# Patient Record
Sex: FEMALE | Race: WHITE | Employment: FULL TIME | URBAN - METROPOLITAN AREA
[De-identification: names, ages, dates, MRNs, and addresses within clinical notes are randomized per-mention and may not be internally consistent; named-entity substitution may affect disease eponyms.]

---

## 2017-07-20 ENCOUNTER — TRANSFERRED RECORDS (OUTPATIENT)
Dept: HEALTH INFORMATION MANAGEMENT | Facility: CLINIC | Age: 52
End: 2017-07-20

## 2017-09-12 ENCOUNTER — TRANSFERRED RECORDS (OUTPATIENT)
Dept: HEALTH INFORMATION MANAGEMENT | Facility: CLINIC | Age: 52
End: 2017-09-12

## 2017-11-07 ENCOUNTER — TRANSFERRED RECORDS (OUTPATIENT)
Dept: HEALTH INFORMATION MANAGEMENT | Facility: CLINIC | Age: 52
End: 2017-11-07

## 2018-08-30 ENCOUNTER — TRANSFERRED RECORDS (OUTPATIENT)
Dept: HEALTH INFORMATION MANAGEMENT | Facility: CLINIC | Age: 53
End: 2018-08-30

## 2018-11-08 ENCOUNTER — TRANSFERRED RECORDS (OUTPATIENT)
Dept: HEALTH INFORMATION MANAGEMENT | Facility: CLINIC | Age: 53
End: 2018-11-08

## 2018-12-28 ENCOUNTER — TRANSFERRED RECORDS (OUTPATIENT)
Dept: HEALTH INFORMATION MANAGEMENT | Facility: CLINIC | Age: 53
End: 2018-12-28

## 2019-01-23 ENCOUNTER — TRANSFERRED RECORDS (OUTPATIENT)
Dept: HEALTH INFORMATION MANAGEMENT | Facility: CLINIC | Age: 54
End: 2019-01-23

## 2019-01-28 ENCOUNTER — TRANSFERRED RECORDS (OUTPATIENT)
Dept: HEALTH INFORMATION MANAGEMENT | Facility: CLINIC | Age: 54
End: 2019-01-28

## 2019-02-07 ENCOUNTER — TRANSFERRED RECORDS (OUTPATIENT)
Dept: HEALTH INFORMATION MANAGEMENT | Facility: CLINIC | Age: 54
End: 2019-02-07

## 2019-02-08 ENCOUNTER — TRANSFERRED RECORDS (OUTPATIENT)
Dept: HEALTH INFORMATION MANAGEMENT | Facility: CLINIC | Age: 54
End: 2019-02-08

## 2019-03-20 ENCOUNTER — TRANSFERRED RECORDS (OUTPATIENT)
Dept: HEALTH INFORMATION MANAGEMENT | Facility: CLINIC | Age: 54
End: 2019-03-20

## 2019-10-28 ENCOUNTER — TRANSFERRED RECORDS (OUTPATIENT)
Dept: HEALTH INFORMATION MANAGEMENT | Facility: CLINIC | Age: 54
End: 2019-10-28

## 2019-11-13 ENCOUNTER — TRANSFERRED RECORDS (OUTPATIENT)
Dept: HEALTH INFORMATION MANAGEMENT | Facility: CLINIC | Age: 54
End: 2019-11-13

## 2020-02-26 ENCOUNTER — TRANSFERRED RECORDS (OUTPATIENT)
Dept: HEALTH INFORMATION MANAGEMENT | Facility: CLINIC | Age: 55
End: 2020-02-26

## 2020-03-04 ENCOUNTER — TRANSFERRED RECORDS (OUTPATIENT)
Dept: HEALTH INFORMATION MANAGEMENT | Facility: CLINIC | Age: 55
End: 2020-03-04

## 2020-03-11 ENCOUNTER — TRANSFERRED RECORDS (OUTPATIENT)
Dept: HEALTH INFORMATION MANAGEMENT | Facility: CLINIC | Age: 55
End: 2020-03-11

## 2020-03-25 ENCOUNTER — REFERRAL (OUTPATIENT)
Dept: TRANSPLANT | Facility: CLINIC | Age: 55
End: 2020-03-25

## 2020-03-25 DIAGNOSIS — K86.1 CHRONIC PANCREATITIS (H): Primary | ICD-10-CM

## 2020-03-25 NOTE — TELEPHONE ENCOUNTER
Auto Islet Intake Notes    Initial Call Made by:  Evelyn Miles    Referring Physician:  Dr Kent    Assigned Coordinator:  Jessica Reynolds    Reported Diagnosis:  Chronic pancreatitis      Preferred Evaluation Start Date:  ASAP     MyChart setup and link for [facility] sent to patient     Auto Islet packet sent by mail     Verbal and Email consent: Yes      Care Team (phone and fax):   PCP:  Dr Deshaun Kent   GI:     Endo:     Pain Management:     Referring Physician:  Dr Kent   Surgeon (if had one):        Insurance information:   Insurance:  [facility]  PPO   Policy/Subscriber number:  [id number]   Group Number:  [id number]   Relation to insured:  self   Effective since:  years

## 2020-03-26 ENCOUNTER — DOCUMENTATION ONLY (OUTPATIENT)
Dept: TRANSPLANT | Facility: CLINIC | Age: 55
End: 2020-03-26

## 2020-04-29 ENCOUNTER — VIRTUAL VISIT (OUTPATIENT)
Dept: TRANSPLANT | Facility: CLINIC | Age: 55
End: 2020-04-29
Attending: PEDIATRICS
Payer: COMMERCIAL

## 2020-04-29 DIAGNOSIS — K86.1 OTHER CHRONIC PANCREATITIS (H): ICD-10-CM

## 2020-04-29 RX ORDER — IBUPROFEN 600 MG/1
600 TABLET, FILM COATED ORAL
COMMUNITY
Start: 2019-01-30 | End: 2020-07-23

## 2020-04-29 RX ORDER — SUMATRIPTAN 50 MG/1
50 TABLET, FILM COATED ORAL
COMMUNITY
Start: 2020-01-16 | End: 2020-07-23

## 2020-04-29 RX ORDER — ONDANSETRON 4 MG/1
4 TABLET, ORALLY DISINTEGRATING ORAL EVERY 8 HOURS PRN
COMMUNITY
Start: 2019-01-07

## 2020-04-29 RX ORDER — HYDROCODONE BITARTRATE AND ACETAMINOPHEN 5; 325 MG/1; MG/1
1 TABLET ORAL EVERY 6 HOURS PRN
Status: ON HOLD | COMMUNITY
End: 2020-08-06

## 2020-04-29 RX ORDER — FLUTICASONE PROPIONATE 50 MCG
1 SPRAY, SUSPENSION (ML) NASAL 2 TIMES DAILY
COMMUNITY
Start: 2019-08-29 | End: 2020-09-03

## 2020-04-29 RX ORDER — CETIRIZINE HYDROCHLORIDE 10 MG/1
TABLET, CHEWABLE ORAL
COMMUNITY
Start: 2018-11-01 | End: 2020-07-23

## 2020-04-29 NOTE — PROGRESS NOTES
Pediatric Endocrinology/ Islet Autotransplant  Chronic Pancreatitis Consult    Patient Active Problem List   Diagnosis     Chronic pancreatitis (H)       Assessment/Plan:  1.  Chronic pancreatitis, and h/o recurrent acute pancreatitis    Dottie is a 54 year old with pancreatitis, considering surgical management.      Complicating issues:  -- Puestow and long duration of disease.  Discussed specific risks for this patient, primarily likely lower than average islet yield, reduced chance for insulin independence (likely 10% in contrast to up to 40% off insulin per standard statistics below) but that goal will be to have islet graft function.  -- Discussed long recovery time, chance for pain relief (85%) and likely some lesser pain persisting from central sensitization.   -- consider genetic testing- suspect she may have a genetic cofactor (with divisum) based on age of onset.  Also has pulmonary history- recurrent pneumonia as child, at least 4 episodes.  -- Have GI review imaging from 2/2020-- looking at radiology report for MRCP appears there may have been concern for a focal lesion and unclear to me if EUS was done subsequent to this, or any more recent imaging.       The primary purpose of today's visit was to review the patient's endocrine history and provide counseling on islet autotransplantation.  We discussed the procedure of islet autotransplant, the post-operative management, and the prognosis.  We specifically discussed that all patients are on insulin after this procedure, typically for at least several months.  About 1/3rd of patients will become insulin independent.  However, there remains a very high lifetime risk for diabetes.  Of the patients who require life long insulin therapy, most will have some graft function and a significant portion can maintain reasonable glycemic control on once injection per day of basal insulin.  However, about 30-40% of patients will require 4-6 injections per day or an  insulin pump for management.  Only 10% have no or minimal islet function;  These patients are at higher risk for a labile form of diabetes mellitus that can be difficult to manage.  The main goal of the islet autotransplant procedure is to preserve enough beta cell mass to make diabetes manageable.  Because of the high risk of diabetes mellitus, all patients must be willing to accept diabetes and insulin injections as a trade off for relief from pancreatic pain.    We discussed diabetes technologies including insulin pump and sensors.    We discussed will need eventual standard consult blood work including mixed meal, which we cannot yet do because of covid situation.    Also will need DXA locally for evaluation for osteopenia/osteoporosis due to increased risk for low BMD and fracture in the setting of CP.      All surgical consults are reviewed by our multi-disciplinary team to determine if surgery is an appropriate next option.      I spent 50 minutes on video for this visit, providing counseling on above issues, and 30 minutes reviewing outside records.    Enedina López MD  [facility]  Phone:  [phone]  Fax:  [phone]          CC:  Chronic pancreatitis, surgical evaluation    HPI:  Dottie Hernandez is a 54 year old female referred by Dr. Kent ([facility]) for new patient consultation of endocrine function in the setting of recurrent acute and chronic pancreatitis.    Pancreatitis history is as follows:  Dottie first had symptoms of pancreatitis about 20 years ago during her 2nd pregnancy, but this went undiagnosed for some time.  During her last pregnancy, about 18 years ago, she even had exploratory surgery, for severe abdominal pain.  It is unclear exactly when the diagnosis of pancreatitis was made, but maybe at that time. She had after pregnancy episodes of acute abdominal pain intermittently that appear to have been  acute recurrent pancreatitis and was eventually tested and found to have elevated lipase from her reports. I can see from records we have since 2011 that she clearly had acute pancreatitis in 1/2011.  Most of the earlier records are not visible.    She was diagnosed sometime before 2011 also with pancreas divisum, and had 1 ERCP with minor papillotomy and PD stent from her descirpiton.  She then subsequently had two surgeries in 2011, around April and July 2011.  The first procedure was open sphincterotomy and the second was a Puestow.  She describes both as having complications but she eventually did better for several years, with rare exacerbations of pain, until around Sept of 2018, where she had recurrence of significant pain. She was seen again for surgical consultation, and TPIAT was discussed at that time, but she was not ready to pursue total pancreatectomy, so on 1/23/2019 she underwent revision of the Puestow.  Operative notes indicate she had significant PD dilation and pancreatic ductal stones at that time, consistent with clear chronic pancreatitis. She did have a MRCP after this procedure in 2/27/2020 that still report a PD dilation in the body and tail, so it is not clear to me that current Puestow extends for the entire length of the pancreatic duct.  Unfortunately she had only very temporary pain relief after this and has continued to struggle with chronic pain of fluctuating severity. 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 She has tried to keep opioid use minimal and is on a hydrocodone/acetominophen 5/325 mg using only 1-2 per day. She does not think she has exocrine insufficiency, was on Creon at some point in the past to treat pain symptoms but it was not effective and she stopped.     Of note, she specifically wishes to avoid oxycodone-- she felt that was more addicting when put on oxycodone in  "past.      Evaluation/ imaging/ treatments:  Elevated amylase and lipase by history:  YES  Etiology of disease: attributed to pancreas divisum.  She has not had genetic testing but first likely episode was 34 years of age (which puts her in high risk category for having a genetic risk factor)  Recent imaging studies:   MRCP 2/27/2020:  Showed dilated PD in body and tail, with \"cut off\" at point of Peustow described  Medical treatment(s):  Pain management, NPO, tried/failed enzymes.  ERCP procedures: YEs;   Number of ERCPs:  1.  Do not have record, we think minor pancreatic duct sphincterotomy and stent placement.   Prior pancreatic surgery: YES, as detailed above, most significantly had Peustow and revision, first Peustow procedure was 7/2011.  Also had a sphincteroplasty prior to that.  Has had cholecystectomy  No nerve blocks    Endocrine history:  Notably has not had any hyperglycemia or insulin use, even around time of other pancreas surgery.        Review of Systems:  A comprehensive 10 point review of systems was performed and was negative except as noted in the HPI above.    Past Medical History:  Past Medical History:   Diagnosis Date     Allergies      Chronic pancreatitis (H)      Recurrent acute pancreatitis    also 4 episodes pneumonia as child.      Past Surgical History:   Procedure Laterality Date     APPENDECTOMY       LAPAROTOMY EXPLORATORY      approximately 2002, during pregnancy for pain     open sphincteroplasty/sphincterotomy  04/2011     PANCREATECTOMY PEUSTOW  07/2011     PANCREATECTOMY PEUSTOW  01/2019    revision       Current medications:  Current Outpatient Medications   Medication Sig Dispense Refill     cetirizine (ZYRTEC) 10 MG CHEW        fluticasone (FLONASE) 50 MCG/ACT nasal spray        HYDROcodone-acetaminophen (NORCO) 5-325 MG tablet Take 1 tablet by mouth every 6 hours as needed for severe pain prn       ibuprofen (ADVIL/MOTRIN) 600 MG tablet 600 mg       ondansetron " (ZOFRAN-ODT) 4 MG ODT tab 4 mg       SUMAtriptan (IMITREX) 50 MG tablet 50 mg         Family History:  The family history was reviewed with particular attention to diabetes and pancreatitis history, and updated by me as pertinent, and is as documented below.    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       Physical Exam:  Vitals:  ND due to virtual viist.  General:  Well appearing female in no acute distress  HEENT: NC/AT, sclera appear white  Neck:  No obvious thyromegaly  CV/Lungs:  Non distressed breathing  Neuro: Normal mental status  Pscyh:  Normal affect          Results:  reviewed outside labs and imaging as available. We will need consult assessments at later date

## 2020-04-29 NOTE — PROGRESS NOTES
"Called patient and left message , calling to set up video visit, I will try again in 10/15 mins. Shira Jamison CMA       Chief Complaint   Patient presents with     Appointment     called patient 2 times and no answer , scheuled for a video visit@9 with Maribel Jamison CMA   Dottie Hernandez is a 54 year old female who is being evaluated via a billable video visit.      The patient has been notified of following:     \"This video visit will be conducted via a call between you and your physician/provider. We have found that certain health care needs can be provided without the need for an in-person physical exam.  This service lets us provide the care you need with a video conversation.  If a prescription is necessary we can send it directly to your pharmacy.  If lab work is needed we can place an order for that and you can then stop by our lab to have the test done at a later time.    Video visits are billed at different rates depending on your insurance coverage.  Please reach out to your insurance provider with any questions.    If during the course of the call the physician/provider feels a video visit is not appropriate, you will not be charged for this service.\"    Patient has given verbal consent for Video visit? Yes    How would you like to obtain your AVS? MyChart    Patient would like the video invitation sent by: Send to e-mail at: [email or web address]    Will anyone else be joining your video visit? No        Video-Visit Details    Type of service:  Video Visit    Video Start Time: 9:20am  Video End Time: 10:10am    Originating Location (pt. Location): Home    Distant Location (provider location):  [facility] SOL[facility] ORGAN [facility]ANSPL[facility]     Mode of Communication:  Video Conference via [facility]      See additional [facility] Note    Enedina López MD  , Pediatric Endocrinology and Diabetes  [facility]      "

## 2020-04-30 ENCOUNTER — VIRTUAL VISIT (OUTPATIENT)
Dept: TRANSPLANT | Facility: CLINIC | Age: 55
End: 2020-04-30
Attending: TRANSPLANT SURGERY
Payer: COMMERCIAL

## 2020-04-30 VITALS — BODY MASS INDEX: 24.16 KG/M2 | WEIGHT: 145 LBS | HEIGHT: 65 IN

## 2020-04-30 DIAGNOSIS — K86.1 CHRONIC PANCREATITIS, UNSPECIFIED PANCREATITIS TYPE (H): Primary | ICD-10-CM

## 2020-04-30 ASSESSMENT — MIFFLIN-ST. JEOR: SCORE: 1268.6

## 2020-04-30 NOTE — PROGRESS NOTES
"Dottie Hernandez is a 54 year old female who is being evaluated via a billable video visit.      The patient has been notified of following:     \"This video visit will be conducted via a call between you and your physician/provider. We have found that certain health care needs can be provided without the need for an in-person physical exam.  This service lets us provide the care you need with a video conversation.  If a prescription is necessary we can send it directly to your pharmacy.  If lab work is needed we can place an order for that and you can then stop by our lab to have the test done at a later time.    Video visits are billed at different rates depending on your insurance coverage.  Please reach out to your insurance provider with any questions.    If during the course of the call the physician/provider feels a video visit is not appropriate, you will not be charged for this service.\"    Patient has given verbal consent for Video visit? Yes    How would you like to obtain your AVS? E-Mail (inform patient AVS not encrypted)    Patient would like the video invitation sent by: Text to cell phone: [phone]    Will anyone else be joining your video visit? No        Video-Visit Details    Type of service:  Video Visit    Video Start Time: 2:15 pm  Video End Time: 2:50 pm    Originating Location (pt. Location): Home    Distant Location (provider location):  [employer] [facility]GAN [facility]PL[facility]     Platform used for Video Visit: Anuradha Jarvis MD    Chronic Pancreatitis Group Consult Note     Patient: Dottie Hernandez,   YOB: 1965,   Medical record number: [id number]  Consult requested by Dr Kent for evaluation of chronic pancreatitis.    Assessment:  1. Chronic painful pancreatitis   2. Etiology: Pancreatic Divisum    Criteria for TPIAT: failed endoscopic stenting with small duct pancreatitis, chronic pain affecting daily functioning, non-diabetic.    Recommendations: Ms. Sinclair" David appears to be a good candidate for total pancreatectomy and islet autotransplant.     The majority of our consultation visit today was spent discussing potential surgical and medical complications of total pancreatectomy, the range of outcomes for pain control, diabetes, and long term sequela. The goal of the operation is relief from chronic pain, with restoration of a more normal functional status. Surgical risks include bleeding, possible transfusion, infection, deep vein or portal vein clotting, bile leak or stricture, injury to the liver vasculature, delayed gastric emptying, hernia, need for reoperation, bowel obstruction, difficulties with nausea, constipation and diarrhea, as well as other medical risks such as pneumonia, cardiac risks, malnutrition, brittle diabetes, and a 0.5% perioperative risk of death. The patient was told that oral pancreatic enzyme replacement therapy and acid blockade would be permanently required with meals and snacks in order to prevent malnutrition and vitamin deficiencies and ulcer disease. There is risk of developing motility issues (diarrhea, constipation, nausea) that may be difficult to manage, especially if the patient cannot completely wean from narcotics. The patient understands that the islet cell autotransplant portion of the procedure is to prevent or ameliorate the otherwise inevitable insulin dependent and brittle diabetes that would result from total pancreatectomy. There is a very low (but not zero) risk of transplanting cancerous pancreatic tissue unknowingly. The patient understands the need to accept diabetes as a potential consequence of proceeding with surgery, and we discussed the importance of proper short and long-term diabetes management. While it is not possible to completely predict the postoperative diabetes outcome with certainty in a single individual's case, in our series of more than 700 cases, approximately 1/3 of patients are insulin  independent, 1/3 required basal insulin only, and 1/3 required basal/bolus or 'typical diabetic' insulin therapy.  Rarely, we find intraoperatively that  pancreatectomy with islet cell transplant is not possible/safe due to vascular involvement or discovery of cancer. We discussed the average inpatient length of stay (10 days), the typical ekta- and post-operative patient experience, care plan for pain management, nutrition, and posttransplant intensive insulin therapy for at least several months and potentially permanently, as well as the requirement that a  care partner  be available to help with post-discharge outpatient care which usually lasts up to several weeks while the patient remains near the medical center for necessary outpatient care and monitoring. The patient was accompanied by  and there was discussion regarding the above which involved all parties.      Overall candidacy for total pancreatectomy and islet autotransplant will be determined by our Multidisciplinary Chronic Pancreatitis Team, and further recommendations will follow.  Thank you for the opportunity to participate in Ms. Dottie Hernandez's care.    Total time: 60 minutes  Counselling time: 35 minutes      Steve Jarvis MD  ---------------------------------------------------------------------------------------------------  HPI:   Patient was diagnosed with AP 20 years ago, attributed to pancreatic divisium, she had stones in pancreatic duct.  She had several attack during pregnancy requiring exploratory laparotomy at 8 weeks, had early labor with 3rd child.  Her multiple acute pancreatitis episodes evolved into chronic.  She had cholecystectomy, appendectomy  ERCP- stenting.  Peustow x3    Pain: once a day Norco 5/325  Pancreatic enzymes - tried in the past but did not work  No h/o nerve blocks    Eating on her own, avods spicy food, stays away from beef  Stress exacerbates her pain.  At this point her acute attacks   out, but she gets recurrent pain with new strictures.  She manages pain with medications and diet.    No DM    No fh pancreatitis  Mother NHL  No panc ca        Surgical evaluation:  1. Portal/Splenic Vein:Patent  2. Hepatic Artery: normal anatomy  3. Previous Pancreatic Surgery: Puestow Procedure x3  4. Pancreatic stent in place: No  5. MRCP: pancreatic duct dilation; atrophic panreas  7. Cardiac: Risk factors: none. Performance status: no chest pain or shortness of breath with exertion.   8. Nutritional Status: Moderate  9. Genetic Evaluation: needs to be done.      The patient is not diabetic.     Other Pertinent History:  []          Urinary retention  []          Gestational diabetes  []          Fear of needles  []          Orthodox  []          Kidney problems  []          Family member with pancreatitis  []          Family member with pancreatic cancer  []          Currently following with a psychologist/psychiatrist     []          History of  care with psychologist/psychiatrist     []          Depression    []          Anxiety    []          Sexual abuse history   []          Non-prescription substance use  []          Prior overdose  []          Ever shared or sold own narcotic supply  []          Ever discharged from a pain clinic  []          Family history of cystic fibrosis  []          Other:  [x]          None    Past Medical History:   Diagnosis Date     Allergies      Chronic pancreatitis (H)      Recurrent acute pancreatitis      Past Surgical History:   Procedure Laterality Date     APPENDECTOMY       LAPAROTOMY EXPLORATORY      approximately 2002, during pregnancy for pain     open sphincteroplasty/sphincterotomy  04/2011     PANCREATECTOMY PEUSTOW 07/2011     PANCREATECTOMY PEUSTOW 01/2019    revision     Family History   Problem Relation Age of Onset     Lymphoma Mother         non hodgkins lymphoma     Coronary Artery Disease Mother      Coronary Artery Disease Father       Diabetes No family hx of      Pancreatitis No family hx of      Social History     Socioeconomic History     Marital status:      Spouse name: Not on file     Number of children: Not on file     Years of education: Not on file     Highest education level: Not on file   Occupational History     Not on file   Social Needs     Financial resource strain: Not on file     Food insecurity     Worry: Not on file     Inability: Not on file     Transportation needs     Medical: Not on file     Non-medical: Not on file   Tobacco Use     Smoking status: Not on file   Substance and Sexual Activity     Alcohol use: Not on file     Drug use: Not on file     Sexual activity: Not on file   Lifestyle     Physical activity     Days per week: Not on file     Minutes per session: Not on file     Stress: Not on file   Relationships     Social connections     Talks on phone: Not on file     Gets together: Not on file     Attends Anabaptism service: Not on file     Active member of club or organization: Not on file     Attends meetings of clubs or organizations: Not on file     Relationship status: Not on file     Intimate partner violence     Fear of current or ex partner: Not on file     Emotionally abused: Not on file     Physically abused: Not on file     Forced sexual activity: Not on file   Other Topics Concern     Not on file   Social History Narrative    Dottie lives in [place] with her family and 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       ROS:  A 12 point review of systems was performed and was negative except for those listed above     Allergies:   No Known Allergies    Medications:  Prescription Medications as of 5/1/2020       Rx Number Disp Refills Start End Last Dispensed Date Next Fill Date Owning Pharmacy    cetirizine (ZYRTEC) 10 MG CHEW    11/1/2018        Class: Historical    fluticasone (FLONASE) 50 MCG/ACT nasal spray    8/29/2019        Class: Historical    HYDROcodone-acetaminophen (NORCO)  5-325 MG tablet            Sig: Take 1 tablet by mouth every 6 hours as needed for severe pain prn    Class: Historical    Route: Oral    ibuprofen (ADVIL/MOTRIN) 600 MG tablet    2019        Si mg    Class: Historical    ondansetron (ZOFRAN-ODT) 4 MG ODT tab    2019        Si mg    Class: Historical    SUMAtriptan (IMITREX) 50 MG tablet    2020        Si mg    Class: Historical          Exam:      Appearance: in no apparent distress.   Head and Neck: Normal, no rashes or jaundice  Respiratory: easy respirations, no audible wheezing.  Cardiovascular: well perfused skin  Abdomen: No distention, midline well healed incision  Extremeties: Edema, none  Neuro: without deficit, Full affect.    Diagnostics:   No results found for this or any previous visit (from the past 336 hour(s)).

## 2020-05-07 ENCOUNTER — DOCUMENTATION ONLY (OUTPATIENT)
Dept: TRANSPLANT | Facility: CLINIC | Age: 55
End: 2020-05-07

## 2020-05-07 NOTE — PROGRESS NOTES
Patient's case discussed at Chronic Pancreatitis Working Group on 5/6/2020.     After team discussion, it was determined that patient is a candidate for a Total Pancreatectomy-Islet Auto Transplant. Discussed that due to previous history of pancreatic surgeries her islet yield may be somewhat diminished.      Patient requirements before scheduling surgery: Pre-op vaccinations, consult with team genetic counselor, and Boost test.     Patient updated and agreeable to plan of care: Yes.       Verified that patient has coordinator contact information should they have any further questions. Coordinator will work with patient to have pre-op vaccinations adminstered and appointments scheduled.         Gena Grace RN BSN  Transplant coordinator   Total Pancreatectomy and Islet Auto Transplant program

## 2020-05-13 DIAGNOSIS — K86.1 CHRONIC PANCREATITIS (H): Primary | ICD-10-CM

## 2020-05-29 ENCOUNTER — VIRTUAL VISIT (OUTPATIENT)
Dept: CONSULT | Facility: CLINIC | Age: 55
End: 2020-05-29
Attending: GENETIC COUNSELOR, MS
Payer: COMMERCIAL

## 2020-05-29 DIAGNOSIS — Z71.83 ENCOUNTER FOR NONPROCREATIVE GENETIC COUNSELING: ICD-10-CM

## 2020-05-29 DIAGNOSIS — G89.29 CHRONIC ABDOMINAL PAIN: ICD-10-CM

## 2020-05-29 DIAGNOSIS — Z87.01 HISTORY OF PNEUMONIA AS A CHILD: ICD-10-CM

## 2020-05-29 DIAGNOSIS — K86.1 IDIOPATHIC CHRONIC PANCREATITIS (H): ICD-10-CM

## 2020-05-29 DIAGNOSIS — K86.1 CHRONIC PANCREATITIS, UNSPECIFIED PANCREATITIS TYPE (H): Primary | ICD-10-CM

## 2020-05-29 DIAGNOSIS — R10.9 CHRONIC ABDOMINAL PAIN: ICD-10-CM

## 2020-05-29 PROCEDURE — 96040 ZZH GENETIC COUNSELING, EACH 30 MINUTES: CPT | Mod: ZF | Performed by: GENETIC COUNSELOR, MS

## 2020-05-29 NOTE — PROGRESS NOTES
"Dottie Hernandez is a 54 year old female who is being evaluated via a billable video visit.      The patient has been notified of following:     \"This video visit will be conducted via a call between you and your physician/provider. We have found that certain health care needs can be provided without the need for an in-person physical exam.  This service lets us provide the care you need with a video conversation.  If a prescription is necessary we can send it directly to your pharmacy.  If lab work is needed we can place an order for that and you can then stop by our lab to have the test done at a later time.    Video visits are billed at different rates depending on your insurance coverage.  Please reach out to your insurance provider with any questions.    If during the course of the call the physician/provider feels a video visit is not appropriate, you will not be charged for this service.\"    Patient has given verbal consent for Video visit? Yes    How would you like to obtain your AVS? [facility]    Patient would like the video invitation sent by: Send to e-mail at: [email or web address]    Will anyone else be joining your video visit? No       Macy Chou M.A.    Video-Visit Details    Type of service:  Video Visit    Video Start Time:   Video End Time:     Originating Location (pt. Location):     Distant Location (provider location):  [facility]     Platform used for Video Visit:           "

## 2020-05-29 NOTE — LETTER
"  5/29/2020      RE: Dottie Hernandez  [address]  [facility] [address]       Dottie Hernandez is a 54 year old female who is being evaluated via a billable video visit.      The patient has been notified of following:     \"This video visit will be conducted via a call between you and your physician/provider. We have found that certain health care needs can be provided without the need for an in-person physical exam.  This service lets us provide the care you need with a video conversation.  If a prescription is necessary we can send it directly to your pharmacy.  If lab work is needed we can place an order for that and you can then stop by our lab to have the test done at a later time.    Video visits are billed at different rates depending on your insurance coverage.  Please reach out to your insurance provider with any questions.    If during the course of the call the physician/provider feels a video visit is not appropriate, you will not be charged for this service.\"    Patient has given verbal consent for Video visit? Yes    How would you like to obtain your AVS? [facility]    Patient would like the video invitation sent by: Send to e-mail at: [email or web address]    Will anyone else be joining your video visit? No  {If patient encounters technical issues they should call [phone] :[id number]}         Macy Chou M.A.  Video-Visit Details    Type of service:  Video Visit    Video Start Time: {video visit start/end time:152948}  Video End Time: {video visit start/end time:152948}    Originating Location (pt. Location): {video visit patient location:487186::\"Home\"}    Distant Location (provider location):  [facility]     Platform used for Video Visit: {Virtual Visit Platforms:719403::\"[facility]\"}    {signature options:113549}        Luisana Segovia GC  "

## 2020-06-12 ENCOUNTER — TRANSFERRED RECORDS (OUTPATIENT)
Dept: HEALTH INFORMATION MANAGEMENT | Facility: CLINIC | Age: 55
End: 2020-06-12

## 2020-06-12 DIAGNOSIS — G89.29 CHRONIC ABDOMINAL PAIN: ICD-10-CM

## 2020-06-12 DIAGNOSIS — Z87.01 HISTORY OF PNEUMONIA AS A CHILD: ICD-10-CM

## 2020-06-12 DIAGNOSIS — R10.9 CHRONIC ABDOMINAL PAIN: ICD-10-CM

## 2020-06-12 DIAGNOSIS — K86.1 CHRONIC PANCREATITIS, UNSPECIFIED PANCREATITIS TYPE (H): ICD-10-CM

## 2020-06-12 DIAGNOSIS — K86.1 IDIOPATHIC CHRONIC PANCREATITIS (H): ICD-10-CM

## 2020-06-22 DIAGNOSIS — K85.90 PANCREATITIS: Primary | ICD-10-CM

## 2020-06-24 DIAGNOSIS — Z11.59 ENCOUNTER FOR SCREENING FOR OTHER VIRAL DISEASES: Primary | ICD-10-CM

## 2020-06-24 PROBLEM — K85.90 PANCREATITIS: Status: ACTIVE | Noted: 2020-06-24

## 2020-06-26 NOTE — PROGRESS NOTES
"Genetic Counseling Consultation  This note is a summary of Dottie Hernandez s virtual visit to [facility] on May 29th, 2020. She was referred to our clinic by Dr. López for genetic testing due to a history of chronic pancreatitis. Genetic testing and a genetic counseling consultation was recommended to aid in better understanding the cause the pancreatitis, as well as provide additional information helpful in medical management and genetic risks for family members.     Summary of visit:  1. Genetics of pancreatitis were discussed, including known involved genes, inheritance patterns, and impact on family members.    2. Genetic testing options were discussed, and a Chronic Pancreatitis panel was ordered through [facility]. They will mail Dottie a saliva kit for her sample and complete an insurance verification process. From there, testing will be initiated. Results will take ~2-4 weeks; Dottie will be called when results are available.  3. Contact information was given for future questions or concerns that arise.    Personal Medical History:  Dottie has a history of recurrent acute and chronic pancreatitis, with symptoms starting around 34 years of age (though undiagnosed for several years). She had an exploratory surgery for abdominal pain approximately 18 years ago, and was eventually tested and found to have elevated lipase per report. She was diagnosed sometime before 2011 with pancreas divisum. Previous operative notes indicate she had significant PD dilation and pancreatic ductal stones, consistent with chronic pancreatitis. She notes continuing chronic pain and consideration of TPIAT. Excessive use of alcohol or other triggering factors is denied and work-up for pancreatitis appears to be otherwise negative for known causative factors.     Additional history includes skin cancer, recurrent pneumonia ~every year as a child, gallbladder removal, a \"pigeon chest\", sinus polyps that were " "removed, allergies, and sleep apnea.     Family History:  A three generation pedigree was obtained today and sent to be scanned into the EMR. The family history was significant for the following:    Dottie has three sons. Her eldest son is 22 years old and has a history of possible stomach issues/an ulcer. Her middle son is 20 and has a history of allergies. Her youngest son is 18 and was born premature, but is otherwise healthy.    Dottie has one brother and one sister. Her brother is 57 years old and has a history of high blood pressure. He has 2 children, who have no health concerns that Dottie is aware of. Dottie's sister is 59 and has a history of allergies. She has 4 children, who have a history of allergies and ADHD.    Dottie's mother passed away at 71 years old from non-hodgkin's lymphoma. She had a history of open heart surgery due to blockage. Dottie has two maternal uncles and one maternal aunt. One maternal uncle passed in his 80's from cancer, which Dottie believed was liver cancer. Dottie's maternal first cousins have no major health concerns. Dottie's maternal grandmother passed at an old age and her maternal grandfather passed in a car accident.    Dottie's father passed at 87 years old from \"old age.\" He had a history of heart surgery due to blockage. He was an only child. Dottie's paternal grandmother passed at an old age. Dottie's paternal grandfather passed at a young age from cancer. Dottie was unsure of what type of cancer and when he was diagnosed/passed, though this was when her father was in his teens.      The family history was negative for individuals with diagnosed pancreatitis, pancreatic cancer, known genetic conditions, and cystic fibrosis/CF symptoms. Dottie is of [place] ancestry on her maternal and English ancestry on her paternal side. Consanguinity was denied.    General Genetic Background Information;  Genes are long stretches of DNA that are responsible for how our " bodies look and how our bodies work.  We all have two copies of each gene; one inherited from the mother and one inherited from the father.  When there is a change, called a mutation, in the DNA sequence of a gene it can cause the signs and symptoms of a genetic condition. No one has control over which copy they pass on to their child since it is a random process.   Every person has two copies of each gene.      Genetics of Pancreatitis:  Knowns and unknowns about causes of pancreatitis, focusing on hereditary pancreatitis, were discussed today.  Hereditary pancreatitis can be defined based on family history criteria or on genetic findings in an individual. One of the genes that is causative for pancreatitis is PRSS1. This gene can also cause an increased risk for pancreatic cancer.  Many other genes, such as SPINK1, CTRC, CPA1, CaSR, and others are thought to be moderate risk factor genes and have an association with pancreatitis. Additionally idiopathic pancreatitis has been found to be associated with changes in the gene for cystic fibrosis (CFTR) as well. The variability in the inheritance of genes associated with hereditary pancreatitis was discussed.      Typical hereditary pancreatitis is a disease that is primarily inherited in an autosomal dominant manner, meaning that a change in one copy of a gene, like PRSS1, is needed to develop the condition. One other gene known to be associated with hereditary pancreatitis, CFTR, typically causes disease when inherited in an autosomal recessive manner, meaning that an individual must inherit a change in the CFTR gene from both their mother and father. The risk factor genes are also inherited in an autosomal dominant fashion, but with these genes most individuals that carry a change do not get pancreatitis. We discussed the complexity of genetic testing for hereditary pancreatitis and that further discussion would be needed when we have the results.     If a known  disease causing change was found, this would carry up to a 50% potential risk for future children and siblings to inherit the genetic change, and a 50% chance of not inheriting it. Not all individuals with genetic changes in these genes go on to develop pancreatitis, and each gene is associated with a different risk for the development of pancreatitis. If a change is found in the CFTR gene, this result would have additional implications and they will be discussed further if applicable. If a specific genetic variant in a family can be identified, more information about risk for other family members and familial testing would be available.     Genetic Testing:  There are genetic saliva or blood tests available that can help determine if an individual has changes in their PRSS1, SPINK1, CTRC, and CFTR genes. As the cause of Dottie's pancreatitis is unclear, comprehensive genetic testing will give useful information to aid in directing medical management, reproductive risks, and family member risks. Specifically, if a underlying explanation for Dottie's pancreatis can be found, it may give more information about how to appropriate manage her or give information about what to expect. In addition, it is possible an underlying genetic cause may predispose Dottie to other health risks. Knowing about these additional health risks can help us stay ahead of her healthcare to more appropriately screen for and potentially prevent other complications. Lastly, discovering an underlying reason may help predict the chance for other family members to have pancreatitis and/or other genetic conditions, such as cystic fibrosis.     Limitations and risks of genetic testing were also discussed, including that our genetic testing in 2020 is only as good as our current knowledge of genetics and genes. Therefore, a negative test result does not rule out all genetic changes and causes of pancreatitis. Other follow up may be needed.      A  complete discussion of benefits and limitations of testing both through [facility] and another commercial laboratory ([facility]) was discussed today. This included information about testing coverage/genes tested, cost, and testing protocol. Of note, [facility] pancreatitis testing includes additional risk factor information for pancreatitis not included elsewhere. Dottie elected to proceed with testing through [facility] due to cost considerations after full discussion of lab options and benefits/limitations. [facility] will mail a saliva kit directly to Dottie for sample collection. [facility] will contact Dottie for a decision on how to proceed after a benefits investigation is complete and will bill insurance directly. Once a sample is received and benefits investigation complete, results will take 2-4 weeks. I will call Dottie at that time. Verbal consent was obtained for [facility]'s pancreatitis panel (due to COVID-19) and sent into the EMR. This panel includes the following 6 genes: PRSS1, SPINK1, CASR, CFTR, CPA1, and CTRC.     Result Outcomes  Once results are available, we discussed the following possibilities:  1. One disease causing mutation found: Individuals with typical hereditary pancreatitis typically have one disease causing change detected.  Depending on the change found and other factors, individuals are at risk for development of pancreatitis, and other family members would be at risk.  2. One or two possibly contributory mutation found:  This result has various implications depending the specific change found.  The change(s) may contribute to disease.   3. Two mutations in the gene for CF:  Individuals with cystic fibrosis or cystic fibrosis-related disorder usually have two mutations in this gene, one inherited from their mother and one from their father. Depending on the changes in the gene and other factors, some people may have respiratory, GI symptoms,  and/or infertility issues.   4. No mutations in these genes: No genetic evidence to support hereditary pancreatitis at this time. It is possible we will learn more about the genetics of pancreatitis in the future and be able to offer more comprehensive genetic testing.   5. The finding of an unknown change: This happens when the laboratory detects a change but they do not know if it is found only in individuals with the condition, or if the change is found in individuals without pancreatitis as well. If you have this type of result, we will discuss it further at that time.     Plan  1. Consent was obtained for [facility]'s pancreatitis genetic panel. A saliva kit will be sent directly to Dottie for her sample. [facility] will perform an insurance pre-verification prior to testing to ensure coverage.   2. Results will take approximately 2-4 weeks once initiated and I will notify Dottie of the results as soon as we receive them. This test can detect many changes in the known genes associated with hereditary pancreatitis; however, there remain genetic contributions that are not well understood.  3. Additional medical management recommendations or family member recommendations will be discussed in the future based on results.      It was a pleasure to meet with Dottie virtually today. She had no additional questions. My contact information was given for future questions or concerns that arise.    Sangeetha Segovia MS, [facility]  Genetic Counseling  Genetics and Metabolism Division  [facility]   Phone: [phone]  Pager: [phone]     Time spent in consultation was approximately 59 minutes  CC:  No letter

## 2020-06-29 ENCOUNTER — TELEPHONE (OUTPATIENT)
Dept: CONSULT | Facility: CLINIC | Age: 55
End: 2020-06-29

## 2020-06-29 NOTE — TELEPHONE ENCOUNTER
I called Dottie to discuss her genetic test results, which have returned. We reviewed that her testing was sent to [facility], who tested for pathogenic genetic changes, or mutations, in 6 high/moderate risk pancreatitis genes: PRSS1, SPINK1, CASR, CFTR, CPA1, and CTRC. This testing returned negative/normal, meaning no pathogenic changes were identified in these genes. Therefore, no underlying single gene genetic explanation for Dottie's symptoms was found at this time.     We discussed that while these results decrease the chance that there is a genetic single gene cause or contribution for Dottie's pancreatitis, we cannot rule out a genetic cause/contribution and this does not rule out all causes of pancreatitis. Of note, our current genetic testing is only as good as our current understanding of genes and genetics. We certainly may know more about genetic factors and their contribution to pancreatitis in the future. Discussed Dottie could always reach out if she wished to discuss/be referred to discuss pursuing [facility] pancreatitis testing (which includes additional information about common small risk factors) or to see if additional pancreatitis genes were discovered in the future.      Dottie had no other questions at this time and was appreciative of the information. Confirmed she had my number for any questions or concerns that arose. Let her know a copy of this test result would be sent to her by mail in the coming weeks, as well as of course shared with Dr. López.        Sangeetha Segovia MS, [facility]  Genetic Counseling  Genetics and Metabolism Division  [facility]   Phone: [phone]  Pager: [phone]  Email: [email or web address]

## 2020-07-07 LAB
LAB SCANNED RESULT: NORMAL
MISCELLANEOUS TEST: NORMAL

## 2020-07-10 DIAGNOSIS — Z01.818 PREOPERATIVE TESTING: ICD-10-CM

## 2020-07-10 DIAGNOSIS — K86.1 CHRONIC PANCREATITIS (H): Primary | ICD-10-CM

## 2020-07-13 DIAGNOSIS — Z01.818 PREOPERATIVE TESTING: Primary | ICD-10-CM

## 2020-07-16 NOTE — TELEPHONE ENCOUNTER
FUTURE VISIT INFORMATION      SURGERY INFORMATION:    Date: 7/27/20    Location: [facility] or    Surgeon:  Steve Jarvis MD     Anesthesia Type:  Combined General with Block     Procedure: Laproscopipc PANCREATECTOMY, TOTAL, WITH AUTOLOGOUS PANCREATIC ISLET CELL TRANSPLANT, Splenectomy, gastrojejunostomy placement     RECORDS REQUESTED FROM:       Pertinent Medical History: None

## 2020-07-21 DIAGNOSIS — K86.1 CHRONIC PANCREATITIS (H): Primary | ICD-10-CM

## 2020-07-23 ENCOUNTER — ALLIED HEALTH/NURSE VISIT (OUTPATIENT)
Dept: EDUCATION SERVICES | Facility: CLINIC | Age: 55
End: 2020-07-23
Payer: COMMERCIAL

## 2020-07-23 ENCOUNTER — ALLIED HEALTH/NURSE VISIT (OUTPATIENT)
Dept: TRANSPLANT | Facility: CLINIC | Age: 55
End: 2020-07-23
Attending: TRANSPLANT SURGERY
Payer: COMMERCIAL

## 2020-07-23 VITALS — BODY MASS INDEX: 23.41 KG/M2 | WEIGHT: 140.65 LBS

## 2020-07-23 DIAGNOSIS — Z01.818 PREOPERATIVE TESTING: ICD-10-CM

## 2020-07-23 DIAGNOSIS — K86.1 CHRONIC PANCREATITIS (H): Primary | ICD-10-CM

## 2020-07-23 DIAGNOSIS — K86.1 CHRONIC PANCREATITIS (H): ICD-10-CM

## 2020-07-23 DIAGNOSIS — K86.1 IDIOPATHIC CHRONIC PANCREATITIS (H): Primary | ICD-10-CM

## 2020-07-23 LAB
ALBUMIN SERPL-MCNC: 4.5 G/DL (ref 3.4–5)
ALBUMIN UR-MCNC: NEGATIVE MG/DL
ALP SERPL-CCNC: 108 U/L (ref 40–150)
ALT SERPL W P-5'-P-CCNC: 36 U/L (ref 0–50)
ANION GAP SERPL CALCULATED.3IONS-SCNC: 4 MMOL/L (ref 3–14)
APPEARANCE UR: ABNORMAL
AST SERPL W P-5'-P-CCNC: 17 U/L (ref 0–45)
BASOPHILS # BLD AUTO: 0 10E9/L (ref 0–0.2)
BASOPHILS NFR BLD AUTO: 0.8 %
BILIRUB SERPL-MCNC: 0.4 MG/DL (ref 0.2–1.3)
BILIRUB UR QL STRIP: NEGATIVE
BUN SERPL-MCNC: 10 MG/DL (ref 7–30)
C PEPTIDE SERPL-MCNC: 1.4 NG/ML (ref 0.9–6.9)
C PEPTIDE SERPL-MCNC: 7.6 NG/ML (ref 0.9–6.9)
CALCIUM SERPL-MCNC: 9.2 MG/DL (ref 8.5–10.1)
CHLORIDE SERPL-SCNC: 107 MMOL/L (ref 94–109)
CHOLEST SERPL-MCNC: 175 MG/DL
CO2 SERPL-SCNC: 28 MMOL/L (ref 20–32)
COLOR UR AUTO: YELLOW
CREAT SERPL-MCNC: 0.62 MG/DL (ref 0.52–1.04)
CREAT UR-MCNC: 287 MG/DL
DIFFERENTIAL METHOD BLD: NORMAL
EOSINOPHIL # BLD AUTO: 0.2 10E9/L (ref 0–0.7)
EOSINOPHIL NFR BLD AUTO: 2.9 %
ERYTHROCYTE [DISTWIDTH] IN BLOOD BY AUTOMATED COUNT: 12.4 % (ref 10–15)
GFR SERPL CREATININE-BSD FRML MDRD: >90 ML/MIN/{1.73_M2}
GLUCOSE SERPL-MCNC: 106 MG/DL (ref 70–99)
GLUCOSE SERPL-MCNC: 122 MG/DL (ref 70–99)
GLUCOSE SERPL-MCNC: 136 MG/DL (ref 70–99)
GLUCOSE UR STRIP-MCNC: NEGATIVE MG/DL
HBA1C MFR BLD: 6 % (ref 0–5.6)
HCG UR QL: NEGATIVE
HCT VFR BLD AUTO: 44.3 % (ref 35–47)
HDLC SERPL-MCNC: 62 MG/DL
HGB BLD-MCNC: 14.5 G/DL (ref 11.7–15.7)
HGB UR QL STRIP: NEGATIVE
HYALINE CASTS #/AREA URNS LPF: 1 /LPF (ref 0–2)
IGG SERPL-MCNC: 659 MG/DL (ref 610–1616)
IMM GRANULOCYTES # BLD: 0 10E9/L (ref 0–0.4)
IMM GRANULOCYTES NFR BLD: 0 %
INR PPP: 1.06 (ref 0.86–1.14)
KETONES UR STRIP-MCNC: 5 MG/DL
LDLC SERPL CALC-MCNC: 92 MG/DL
LEUKOCYTE ESTERASE UR QL STRIP: ABNORMAL
LYMPHOCYTES # BLD AUTO: 1.9 10E9/L (ref 0.8–5.3)
LYMPHOCYTES NFR BLD AUTO: 35.5 %
MCH RBC QN AUTO: 28.2 PG (ref 26.5–33)
MCHC RBC AUTO-ENTMCNC: 32.7 G/DL (ref 31.5–36.5)
MCV RBC AUTO: 86 FL (ref 78–100)
MICROALBUMIN UR-MCNC: 18 MG/L
MICROALBUMIN/CREAT UR: 6.27 MG/G CR (ref 0–25)
MONOCYTES # BLD AUTO: 0.3 10E9/L (ref 0–1.3)
MONOCYTES NFR BLD AUTO: 5 %
MUCOUS THREADS #/AREA URNS LPF: PRESENT /LPF
NEUTROPHILS # BLD AUTO: 2.9 10E9/L (ref 1.6–8.3)
NEUTROPHILS NFR BLD AUTO: 55.8 %
NITRATE UR QL: NEGATIVE
NONHDLC SERPL-MCNC: 112 MG/DL
NRBC # BLD AUTO: 0 10*3/UL
NRBC BLD AUTO-RTO: 0 /100
PH UR STRIP: 6 PH (ref 5–7)
PLATELET # BLD AUTO: 292 10E9/L (ref 150–450)
POTASSIUM SERPL-SCNC: 3.6 MMOL/L (ref 3.4–5.3)
PREALB SERPL IA-MCNC: 29 MG/DL (ref 15–45)
PROT SERPL-MCNC: 7.8 G/DL (ref 6.8–8.8)
RBC # BLD AUTO: 5.14 10E12/L (ref 3.8–5.2)
RBC #/AREA URNS AUTO: 6 /HPF (ref 0–2)
SODIUM SERPL-SCNC: 140 MMOL/L (ref 133–144)
SOURCE: ABNORMAL
SP GR UR STRIP: 1.02 (ref 1–1.03)
SQUAMOUS #/AREA URNS AUTO: 5 /HPF (ref 0–1)
TRANS CELLS #/AREA URNS HPF: 2 /HPF
TRIGL SERPL-MCNC: 100 MG/DL
UROBILINOGEN UR STRIP-MCNC: 0 MG/DL (ref 0–2)
WBC # BLD AUTO: 5.2 10E9/L (ref 4–11)
WBC #/AREA URNS AUTO: 94 /HPF (ref 0–5)

## 2020-07-23 PROCEDURE — 84134 ASSAY OF PREALBUMIN: CPT | Performed by: PEDIATRICS

## 2020-07-23 PROCEDURE — 82043 UR ALBUMIN QUANTITATIVE: CPT | Performed by: PEDIATRICS

## 2020-07-23 PROCEDURE — 81025 URINE PREGNANCY TEST: CPT | Performed by: PEDIATRICS

## 2020-07-23 PROCEDURE — 87086 URINE CULTURE/COLONY COUNT: CPT | Performed by: PEDIATRICS

## 2020-07-23 PROCEDURE — 86850 RBC ANTIBODY SCREEN: CPT | Performed by: PEDIATRICS

## 2020-07-23 PROCEDURE — 84681 ASSAY OF C-PEPTIDE: CPT | Performed by: PEDIATRICS

## 2020-07-23 PROCEDURE — 86900 BLOOD TYPING SEROLOGIC ABO: CPT | Performed by: PEDIATRICS

## 2020-07-23 PROCEDURE — 82947 ASSAY GLUCOSE BLOOD QUANT: CPT | Performed by: PEDIATRICS

## 2020-07-23 PROCEDURE — 85610 PROTHROMBIN TIME: CPT | Performed by: PEDIATRICS

## 2020-07-23 PROCEDURE — 81001 URINALYSIS AUTO W/SCOPE: CPT | Performed by: PEDIATRICS

## 2020-07-23 PROCEDURE — 82784 ASSAY IGA/IGD/IGG/IGM EACH: CPT | Performed by: PEDIATRICS

## 2020-07-23 PROCEDURE — 86901 BLOOD TYPING SEROLOGIC RH(D): CPT | Performed by: PEDIATRICS

## 2020-07-23 PROCEDURE — 83036 HEMOGLOBIN GLYCOSYLATED A1C: CPT | Performed by: PEDIATRICS

## 2020-07-23 PROCEDURE — 36415 COLL VENOUS BLD VENIPUNCTURE: CPT | Performed by: PEDIATRICS

## 2020-07-23 PROCEDURE — 85025 COMPLETE CBC W/AUTO DIFF WBC: CPT | Performed by: PEDIATRICS

## 2020-07-23 PROCEDURE — 80061 LIPID PANEL: CPT | Performed by: PEDIATRICS

## 2020-07-23 PROCEDURE — 80053 COMPREHEN METABOLIC PANEL: CPT | Performed by: PEDIATRICS

## 2020-07-23 PROCEDURE — 84446 ASSAY OF VITAMIN E: CPT | Performed by: PEDIATRICS

## 2020-07-23 PROCEDURE — 86337 INSULIN ANTIBODIES: CPT | Performed by: PEDIATRICS

## 2020-07-23 PROCEDURE — 86341 ISLET CELL ANTIBODY: CPT | Performed by: PEDIATRICS

## 2020-07-23 PROCEDURE — 82306 VITAMIN D 25 HYDROXY: CPT | Performed by: PEDIATRICS

## 2020-07-23 PROCEDURE — 84590 ASSAY OF VITAMIN A: CPT | Performed by: PEDIATRICS

## 2020-07-23 NOTE — PROGRESS NOTES
Administered Boost: 8:55am    Called lab with boost time :     10:00am    11:00am    Patient is aware and given time to return to lab    Rosi Haines CMA

## 2020-07-24 ENCOUNTER — OFFICE VISIT (OUTPATIENT)
Dept: SURGERY | Facility: CLINIC | Age: 55
End: 2020-07-24
Attending: TRANSPLANT SURGERY
Payer: COMMERCIAL

## 2020-07-24 ENCOUNTER — OFFICE VISIT (OUTPATIENT)
Dept: TRANSPLANT | Facility: CLINIC | Age: 55
End: 2020-07-24
Attending: TRANSPLANT SURGERY
Payer: COMMERCIAL

## 2020-07-24 ENCOUNTER — ANESTHESIA EVENT (OUTPATIENT)
Dept: SURGERY | Facility: CLINIC | Age: 55
End: 2020-07-24
Payer: COMMERCIAL

## 2020-07-24 ENCOUNTER — PRE VISIT (OUTPATIENT)
Dept: SURGERY | Facility: CLINIC | Age: 55
End: 2020-07-24

## 2020-07-24 VITALS
HEART RATE: 94 BPM | RESPIRATION RATE: 12 BRPM | WEIGHT: 141 LBS | HEIGHT: 65 IN | SYSTOLIC BLOOD PRESSURE: 123 MMHG | OXYGEN SATURATION: 98 % | TEMPERATURE: 98.3 F | DIASTOLIC BLOOD PRESSURE: 87 MMHG | BODY MASS INDEX: 23.49 KG/M2

## 2020-07-24 VITALS
TEMPERATURE: 98.6 F | SYSTOLIC BLOOD PRESSURE: 111 MMHG | BODY MASS INDEX: 23.61 KG/M2 | WEIGHT: 141.7 LBS | HEIGHT: 65 IN | OXYGEN SATURATION: 97 % | DIASTOLIC BLOOD PRESSURE: 77 MMHG | HEART RATE: 105 BPM

## 2020-07-24 DIAGNOSIS — Z11.59 ENCOUNTER FOR SCREENING FOR OTHER VIRAL DISEASES: ICD-10-CM

## 2020-07-24 DIAGNOSIS — K86.1 IDIOPATHIC CHRONIC PANCREATITIS (H): ICD-10-CM

## 2020-07-24 DIAGNOSIS — Z01.818 PRE-OP EVALUATION: Primary | ICD-10-CM

## 2020-07-24 DIAGNOSIS — Z71.85 VACCINE COUNSELING: ICD-10-CM

## 2020-07-24 DIAGNOSIS — K86.1 CHRONIC PANCREATITIS, UNSPECIFIED PANCREATITIS TYPE (H): Primary | ICD-10-CM

## 2020-07-24 PROBLEM — E55.9 VITAMIN D DEFICIENCY: Status: ACTIVE | Noted: 2020-07-24

## 2020-07-24 PROBLEM — R86.9: Status: ACTIVE | Noted: 2018-12-13

## 2020-07-24 PROBLEM — G47.30 SLEEP APNEA: Status: ACTIVE | Noted: 2018-12-13

## 2020-07-24 PROBLEM — M79.673 PAIN OF FOOT: Status: ACTIVE | Noted: 2019-09-10

## 2020-07-24 PROBLEM — D48.5 NEOPLASM OF UNCERTAIN BEHAVIOR OF SKIN: Status: ACTIVE | Noted: 2019-12-12

## 2020-07-24 PROBLEM — G47.19 EXCESSIVE DAYTIME SLEEPINESS: Status: ACTIVE | Noted: 2018-12-13

## 2020-07-24 PROBLEM — G43.909 MIGRAINE HEADACHE: Status: ACTIVE | Noted: 2020-01-16

## 2020-07-24 PROBLEM — G89.29 CHRONIC PAIN: Status: ACTIVE | Noted: 2019-01-24

## 2020-07-24 PROBLEM — R19.7 DIARRHEA: Status: ACTIVE | Noted: 2019-11-04

## 2020-07-24 PROBLEM — Q45.3 PANCREAS DIVISUM: Status: ACTIVE | Noted: 2019-01-03

## 2020-07-24 PROBLEM — T88.9XXA COMPLICATIONS OF MEDICAL CARE: Status: ACTIVE | Noted: 2019-02-19

## 2020-07-24 PROBLEM — F32.A CHRONIC DEPRESSION: Status: ACTIVE | Noted: 2020-07-24

## 2020-07-24 PROBLEM — R65.10: Status: ACTIVE | Noted: 2019-01-25

## 2020-07-24 PROBLEM — J30.9 ALLERGIC RHINITIS: Status: ACTIVE | Noted: 2018-12-13

## 2020-07-24 PROBLEM — R06.83 SNORING: Status: ACTIVE | Noted: 2018-12-13

## 2020-07-24 PROBLEM — R10.9 ABDOMINAL PAIN, ACUTE: Status: ACTIVE | Noted: 2019-03-02

## 2020-07-24 PROBLEM — K65.1 ABSCESS OF ABDOMINAL CAVITY (H): Status: ACTIVE | Noted: 2019-02-09

## 2020-07-24 PROBLEM — G47.10 HYPERSOMNIA: Status: ACTIVE | Noted: 2018-12-13

## 2020-07-24 PROBLEM — T88.8XXA FLUID COLLECTION AT SURGICAL SITE: Status: ACTIVE | Noted: 2019-02-19

## 2020-07-24 PROBLEM — K58.9 IRRITABLE BOWEL SYNDROME: Status: ACTIVE | Noted: 2020-07-24

## 2020-07-24 PROBLEM — K30 INDIGESTION: Status: ACTIVE | Noted: 2020-07-24

## 2020-07-24 LAB
ABO + RH BLD: NORMAL
ABO + RH BLD: NORMAL
BACTERIA SPEC CULT: NORMAL
BLD GP AB SCN SERPL QL: NORMAL
BLOOD BANK CMNT PATIENT-IMP: NORMAL
BLOOD BANK CMNT PATIENT-IMP: NORMAL
C PEPTIDE SERPL-MCNC: 8.3 NG/ML (ref 0.9–6.9)
DEPRECATED CALCIDIOL+CALCIFEROL SERPL-MC: <46 UG/L (ref 20–75)
SPECIMEN EXP DATE BLD: NORMAL
SPECIMEN SOURCE: NORMAL
VITAMIN D2 SERPL-MCNC: <5 UG/L
VITAMIN D3 SERPL-MCNC: 41 UG/L

## 2020-07-24 PROCEDURE — 90472 IMMUNIZATION ADMIN EACH ADD: CPT

## 2020-07-24 PROCEDURE — 25000128 H RX IP 250 OP 636: Mod: ZF

## 2020-07-24 PROCEDURE — 90648 HIB PRP-T VACCINE 4 DOSE IM: CPT | Mod: ZF

## 2020-07-24 PROCEDURE — G0009 ADMIN PNEUMOCOCCAL VACCINE: HCPCS

## 2020-07-24 PROCEDURE — 90670 PCV13 VACCINE IM: CPT | Mod: ZF | Performed by: NURSE PRACTITIONER

## 2020-07-24 PROCEDURE — 90734 MENACWYD/MENACWYCRM VACC IM: CPT | Mod: ZF

## 2020-07-24 PROCEDURE — 25000128 H RX IP 250 OP 636: Mod: ZF | Performed by: NURSE PRACTITIONER

## 2020-07-24 PROCEDURE — 25000581 ZZH RX MED A9270 GY (STAT IND- M) 250: Mod: ZF

## 2020-07-24 RX ADMIN — PNEUMOCOCCAL 13-VALENT CONJUGATE VACCINE 0.5 ML: 2.2; 2.2; 2.2; 2.2; 2.2; 4.4; 2.2; 2.2; 2.2; 2.2; 2.2; 2.2; 2.2 INJECTION, SUSPENSION INTRAMUSCULAR at 13:05

## 2020-07-24 ASSESSMENT — PAIN SCALES - GENERAL: PAINLEVEL: MODERATE PAIN (5)

## 2020-07-24 ASSESSMENT — MIFFLIN-ST. JEOR
SCORE: 1253.63
SCORE: 1250.45

## 2020-07-24 ASSESSMENT — LIFESTYLE VARIABLES: TOBACCO_USE: 0

## 2020-07-24 NOTE — ANESTHESIA PREPROCEDURE EVALUATION
"Anesthesia Pre-Procedure Evaluation    Patient: Dottie Hernandez   MRN:     [id number] Gender:   female   Age:    54 year old :      1965        Preoperative Diagnosis: Pancreatitis [K85.90]   Procedure(s):  Laproscopipc PANCREATECTOMY, TOTAL, WITH AUTOLOGOUS PANCREATIC ISLET CELL TRANSPLANT, Splenectomy, gastrojejunostomy placement     LABS:  CBC:   Lab Results   Component Value Date    WBC 5.2 2020    HGB 14.5 2020    HCT 44.3 2020     2020     BMP:   Lab Results   Component Value Date     2020    POTASSIUM 3.6 2020    CHLORIDE 107 2020    CO2 28 2020    BUN 10 2020    CR 0.62 2020     (H) 2020     (H) 2020     COAGS:   Lab Results   Component Value Date    INR 1.06 2020     POC:   Lab Results   Component Value Date    HCG Negative 2020     OTHER:   Lab Results   Component Value Date    A1C 6.0 (H) 2020    MONICA 9.2 2020    ALBUMIN 4.5 2020    PROTTOTAL 7.8 2020    ALT 36 2020    AST 17 2020    ALKPHOS 108 2020    BILITOTAL 0.4 2020        Preop Vitals    BP Readings from Last 3 Encounters:   20 123/87   20 111/77    Pulse Readings from Last 3 Encounters:   20 94   20 105      Resp Readings from Last 3 Encounters:   20 12    SpO2 Readings from Last 3 Encounters:   20 98%   20 97%      Temp Readings from Last 1 Encounters:   20 98.3  F (36.8  C) (Oral)    Ht Readings from Last 1 Encounters:   20 1.651 m (5' 5\")      Wt Readings from Last 1 Encounters:   20 64 kg (141 lb)    Estimated body mass index is 23.46 kg/m  as calculated from the following:    Height as of this encounter: 1.651 m (5' 5\").    Weight as of this encounter: 64 kg (141 lb).     LDA:        Past Medical History:   Diagnosis Date     Allergies      Chronic pancreatitis (H)      Recurrent acute pancreatitis       Past Surgical " History:   Procedure Laterality Date     APPENDECTOMY       LAPAROTOMY EXPLORATORY      approximately 2002, during pregnancy for pain     open sphincteroplasty/sphincterotomy  04/2011     PANCREATECTOMY PEUSTOW  07/2011     PANCREATECTOMY PEUSTOW  01/2019    revision      No Known Allergies     Anesthesia Evaluation     . Pt has had prior anesthetic.     History of anesthetic complications   - PONV        ROS/MED HX    ENT/Pulmonary:     (+)sleep apnea, allergic rhinitis, doesn't use CPAP , . .   (-) tobacco use   Neurologic:     (+)migraines,     Cardiovascular:  - neg cardiovascular ROS      (-) taking anticoagulants/antiplatelets   METS/Exercise Tolerance:  >4 METS   Hematologic: Comments: H/o von willebrand        (-) History of Transfusion   Musculoskeletal:  - neg musculoskeletal ROS       GI/Hepatic: Comment: Chronic pancreatitis        Renal/Genitourinary:  - ROS Renal section negative       Endo: Comment: Pre diabetes        Psychiatric:  - neg psychiatric ROS       Infectious Disease:  - neg infectious disease ROS       Malignancy:      - no malignancy   Other:                         PHYSICAL EXAM:   Mental Status/Neuro: A/A/O   Airway: Facies: Feasible  Mallampati: III  Mouth/Opening: Full  TM distance: > 6 cm  Neck ROM: Full   Respiratory: Auscultation: CTAB     Resp. Rate: Normal     Resp. Effort: Normal      CV: Rhythm: Regular  Heart: Normal Sounds  Edema: None   Comments:      Dental: Normal Dentition                Assessment:   ASA SCORE: 3    H&P: History and physical reviewed and following examination; no interval change.    NPO Status: NPO Appropriate     Plan:   Anes. Type:  General   Pre-Medication: None   Induction:  IV (Standard)   Airway: ETT; Oral   Access/Monitoring: PIV; A-Line; CVL   Maintenance: Balanced     Postop Plan:   Postop Pain: Opioids  Postop Sedation/Airway: Not planned  Disposition: Inpatient/Admit     PONV Management:   Adult Risk Factors: Female, Postop Opioids    Prevention: Ondansetron     CONSENT: Direct conversation   Plan and risks discussed with: Patient   Blood Products: Consented (ALL Blood Products)                PAC Discussion and Assessment    ASA Classification: 2  Case is suitable for: [place]  Anesthetic techniques and relevant risks discussed: GA with regional block for post-op pain control  Invasive monitoring and risk discussed:   Types:   Possibility and Risk of blood transfusion discussed:   NPO instructions given:   Additional anesthetic preparation and risks discussed:   Needs early admission to pre-op area:   Other:     PAC Resident/NP Anesthesia Assessment:  Shaina Hernandez is a 54 year old female scheduled for Laproscopipc PANCREATECTOMY, TOTAL, WITH AUTOLOGOUS PANCREATIC ISLET CELL TRANSPLANT, Splenectomy, gastrojejunostomy placement on 7/27/20 by Dr. Jarvis in treatment of pancreatitis.  PAC referral for risk assessment and optimization for anesthesia with comorbid conditions of seasonal allergies, migraines, ANIBAL:    Pre-operative considerations:  1.  Cardiac:  Functional status- METS >4. No reported cardiac history.  Denies cardiac symptoms. high risk surgery with 0.9% (RCRI #) risk of major adverse cardiac event.   2.  Pulm:  Airway feasible.  ANIBAL- not yet using CPAP- reports she has an appointment scheduled for this fall when she returns to [place] to discuss treatment. Denies pulmonary symptoms. COVID testing in process.   3.  GI:  H/o PONV, anti-emetic intervention recommended. Chronic pancreatitis with above procedure planned.   4.  Neuro: migraines- uses Imitrex PRN.  States she has not needed in a couple months.   5. Heme: patient reports a history of von willebrand >20 years ago.  States no symptoms related since her children were born. Denies any bleeding complications with previous surgeries.      VTE risk: 0.26%    Patient is optimized and is acceptable candidate for the proposed procedure.  No further diagnostic evaluation is  needed.     **For further details of assessment, testing, and physical exam please see H and P completed on same date.      Hailey Miller PA-C        Mid-Level Provider/Resident:   Date:   Time:     Attending Anesthesiologist Anesthesia Assessment:        Anesthesiologist:   Date:   Time:   Pass/Fail:   Disposition:     PAC Pharmacist Assessment:        Pharmacist:   Date:   Time:    Hailey Miller PA-C

## 2020-07-24 NOTE — NURSING NOTE
"Chief Complaint   Patient presents with     RECHECK     Pre op     Blood pressure 111/77, pulse 105, temperature 98.6  F (37  C), temperature source Oral, height 1.651 m (5' 5\"), weight 64.3 kg (141 lb 11.2 oz), SpO2 97 %.    Sangeeta Segovia on 7/24/2020 at 12:52 PM    "

## 2020-07-24 NOTE — PROGRESS NOTES
"Diabetes Self Management Education:      Dottie Hernandez presents today for education related to diabetes secondary to total pancreatectomy with auto-islet cell transplant planned for Monday, July 27  Dottie Hernandez is accompanied by self  She is here from [place], [place].  Her  will be coming to be her caretaker.      No previous history of diabetes.  Current A1C of 6.0 indicates pre-diabetes.     ASSESSMENT:  Patient glucose self monitoring as follows: BG meter taught today.  Name of glucose meter:  Valeriy Contour Next USB  Todays Blood Glucose result was 88 on Valeriy Contour Next USB meter.    Exercise: moderate regular exercise program which includes aerobics and strength training.     Nutrition:   Not discussed in detail today.       PATIENT CONCERNS:   She states \"I don't know anything about diabetes\".  Has many good questions, and verbalizes that she understands that she will likely require insulin post op and beyond.  She states \"they told me not to expect a great islet yield\".  She is interested in getting \"one of those things on your arm so you don't have to stick yourself all the time.\"        EDUCATION and INSTRUCTION PROVIDED AT THIS VISIT:     Expectations following surgery related to taking insulin, checking blood glucose, and need for excellent control in the period immediately following surgery and beyond.   Reviewed the normal endocrine function of the pancreas, and how exogenous insulin differs from endogenous insulin   Explained the difference between short and long acting insulin, including onset, duration, and action.   Explained the difference between meal dosing insulin and correction insulin, and explained how she will be using both a long and short acting insulin to control BG's following surgery.   Demonstrated and had patient do return demonstration of administration of insulin using an insulin pen. Patient was able to do this successfully.   Reviewed the correct use of meter, and " answered questions about it's operation. Reviewed needle disposal, changing lancet, using control solution.   Reviewed BG normals and BG goals and emphasized the importance of contacting medical team if goals are not being met.   Explained the recognition and treatment of hypoglycemia . Will instruct in glucagon use during  followup visit after discharge from the hospital.     Discussed continuous glucose monitoring with her.  Suggested that if she is interested, we should make an application for a Dexcom CGM now, and it may come to her in a few weeks.  [facility] insurance will require this to be processed through a DME, most likely.  Will ask Dr. López to sign a CMN, and following her surgery, we should be able to submit to insurance.      Discussed the need for follow up after surgery, to address carb counting, etc.     Patient-stated goal written and given to Dottie Hernandez.  Verbalized and demonstrated understanding of instructions.     PLAN:  See patient instructions  AVS printed and given to patient    FOLLOW-UP:      Follow up visit with RN and RD CDE's scheduled for 3 weeks from date of surgery.  Goal at that visit with be to teach carbohydrate counting and administration of glucagon, as well as other diabetes education    Time spent with patient at today's visit was 75 minutes.

## 2020-07-24 NOTE — LETTER
7/24/2020         RE: Dottie Hernandez  [address]  [facility] [address]        Dear Colleague,    Thank you for referring your patient, Dottie Hernandez, to the [facility]GAN TRANSPL[facility]. Please see a copy of my visit note below.          Pre-op visit for TPIAT    Transplant Surgery Consultation     Dottie Hernandez MRN# [id number]   YOB: 1965 Age: 55 year old             Assessment and Plan:   54 yo lady with CP secondary to pancreatic divisum.  We discussed risks and benefits of the procedure, TPIAT.  Patient elected to proceed and signed the consent.               Chief Complaint:   Chronic Pancreatitis       History of Present Illness:   54 yo lady with CP secondary to pancreatic divisum.  Prior open sphincter of Oddi exploration, [place] x2.  H/o ANIBAL.  Currently, in usual state of health, on chronic narcotics.  Denies chills/fevers, n/v, CP/SOB.            Past Medical History:     Past Medical History:   Diagnosis Date     Allergies      Chronic pancreatitis (H)      Recurrent acute pancreatitis              Past Surgical History:     Past Surgical History:   Procedure Laterality Date     APPENDECTOMY       BIOPSY LIVER N/A 7/27/2020    Procedure: Biopsy liver;  Surgeon: Steve Jarvis MD;  Location: [facility] OR     LAPAROSCOPIC PANCREATECTOMY, TRANSPLANT AUTO ISLET CELL N/A 7/27/2020    Procedure: Laproscopipc PANCREATECTOMY, TOTAL, WITH AUTOLOGOUS PANCREATIC ISLET CELL TRANSPLANT, Splenectomy, gastrojejunostomy placement;  Surgeon: Steve Jarvis MD;  Location: [facility] OR     LAPAROTOMY EXPLORATORY      approximately 2002, during pregnancy for pain     open sphincteroplasty/sphincterotomy  04/2011     PANCREATECTOMY PEUSTOW  07/2011     PANCREATECTOMY PEUSTOW  01/2019    revision                Social History:     Social History     Tobacco Use     Smoking status: Never Smoker     Smokeless tobacco: Never Used   Substance Use Topics     Alcohol use: Not Currently              Family History:     Family History   Problem Relation Age of Onset     Lymphoma Mother         non hodgkins lymphoma     Coronary Artery Disease Mother      Coronary Artery Disease Father      Diabetes No family hx of      Pancreatitis No family hx of      Anesthesia Reaction No family hx of      Deep Vein Thrombosis (DVT) No family hx of                   Allergies:   No Known Allergies          Medications:     No current facility-administered medications for this visit.      No current outpatient medications on file.     Facility-Administered Medications Ordered in Other Visits   Medication     - MEDICATION INSTRUCTIONS -     acetaminophen (TYLENOL) tablet 650 mg     dexmedetomidine (PRECEDEX) 400 mcg in 0.9% sodium chloride 100 mL     dextrose 10% infusion     dextrose 10% infusion     dextrose 5% in lactated ringers infusion     glucose gel 15-30 g    Or     dextrose 50 % injection 25-50 mL    Or     glucagon injection 1 mg     fluconazole (DIFLUCAN) intermittent infusion 400 mg in NaCl     gabapentin (NEURONTIN) solution 300 mg     heparin 25,000 units in 0.45% NaCl 250 mL ANTICOAGULANT  infusion     HYDROmorphone (DILAUDID) PCA 0.2 mg/mL OPIOID TOLERANT     hydrOXYzine (ATARAX) syrup 25 mg     insulin 1 unit/1mL in saline (NovoLIN-Regular) infusion- SOT TPIAT algorithm     ketorolac (TORADOL) injection 15 mg     lactated ringers BOLUS 250 mL     lidocaine (LMX4) cream     lidocaine 1 % 0.1-1 mL     magnesium sulfate 2 g in water intermittent infusion     magnesium sulfate 4 g in 100 mL sterile water (premade)     multivitamins w/minerals (CERTAVITE) liquid 15 mL     naloxone (NARCAN) injection 0.1-0.4 mg     No Anticoagulation unless approved by Anesthesia Provider.     ondansetron (ZOFRAN-ODT) ODT tab 4 mg    Or     ondansetron (ZOFRAN) injection 4 mg     pantoprazole (PROTONIX) 2 mg/mL suspension 40 mg     piperacillin-tazobactam (ZOSYN) 3.375 g vial to attach to  mL bag     potassium & sodium  phosphates (NEUTRA-PHOS) Packet 1 packet     potassium chloride (KLOR-CON) Packet 20-40 mEq     potassium chloride 10 mEq in 100 mL intermittent infusion with 10 mg lidocaine     potassium chloride 10 mEq in 100 mL sterile water intermittent infusion (premix)     potassium chloride 20 mEq in 50 mL intermittent infusion     potassium chloride ER (KLOR-CON M) CR tablet 20-40 mEq     prochlorperazine (COMPAZINE) injection 10 mg    Or     prochlorperazine (COMPAZINE) tablet 10 mg     ropivacaine 0.2% (NAROPIN) 750 mL in ON-Q C-Bloc select flow ([id number] holds 600-750 mL) dual cath disposable pump     sodium chloride (PF) 0.9% PF flush 3 mL     sodium chloride (PF) 0.9% PF flush 3 mL     sodium phosphate 10 mmol in D5W intermittent infusion     sodium phosphate 15 mmol in D5W intermittent infusion     sodium phosphate 20 mmol in D5W intermittent infusion     sodium phosphate 25 mmol in D5W intermittent infusion     vancomycin 1250 mg in 0.9% NaCl 250 mL intermittent infusion 1,250 mg               Review of Systems:   The 10 point Review of Systems is negative other than noted in the HPI           Physical Exam:   Physical Exam  Constitutional:       Appearance: Normal appearance. She is normal weight.   HENT:      Head: Normocephalic and atraumatic.      Nose: Nose normal.   Eyes:      Extraocular Movements: Extraocular movements intact.      Conjunctiva/sclera: Conjunctivae normal.      Pupils: Pupils are equal, round, and reactive to light.   Neck:      Musculoskeletal: Normal range of motion and neck supple.   Cardiovascular:      Rate and Rhythm: Normal rate.      Pulses: Normal pulses.   Pulmonary:      Effort: Pulmonary effort is normal.      Breath sounds: Normal breath sounds.   Abdominal:      General: Bowel sounds are normal.      Palpations: Abdomen is soft.   Musculoskeletal: Normal range of motion.   Skin:     General: Skin is warm and dry.      Capillary Refill: Capillary refill takes 2 to 3 seconds.    Neurological:      General: No focal deficit present.      Mental Status: She is alert and oriented to person, place, and time.   Psychiatric:         Mood and Affect: Mood normal.         Behavior: Behavior normal.               Data:   CT: no replaced right hepatic    Labs: reviewed in EPIC    Again, thank you for allowing me to participate in the care of your patient.        Sincerely,        Steve Jarvis MD

## 2020-07-24 NOTE — H&P
Pre-Operative H & P     CC:  Preoperative exam to assess for increased cardiopulmonary risk while undergoing surgery and anesthesia.    Date of Encounter: 7/24/2020  Primary Care Physician:  No Ref-Primary, Physician  Associated diagnosis: pancreatitis    HPI  Dottie Hernandez is a 54 year old female who presents for pre-operative H & P in preparation for Laproscopipc PANCREATECTOMY, TOTAL, WITH AUTOLOGOUS PANCREATIC ISLET CELL TRANSPLANT, Splenectomy, gastrojejunostomy placement with Dr. Jarvis on 7/27/20 at [facility]. Patient is being evaluated for comorbid conditions of seasonal allergies, migraines, ANIBAL     Ms. Hernandez has a 20 year history of recurrent episodes of pancreatitis that have evolved into chronic pancreatitis.  She is currently using norco for pain.  She has tried pancreatic enxymes without relief. She also tailors her diet to try and avoid spicy foods or beef. She was seen by Dr. Jarvis for further evaluation. Above procedure is planned.    History is obtained from the patient and chart review.      Past Medical History  Past Medical History:   Diagnosis Date     Allergies      Chronic pancreatitis (H)      Recurrent acute pancreatitis        Past Surgical History  Past Surgical History:   Procedure Laterality Date     APPENDECTOMY       LAPAROTOMY EXPLORATORY      approximately 2002, during pregnancy for pain     open sphincteroplasty/sphincterotomy  04/2011     PANCREATECTOMY [place]OW  07/2011     PANCREATECTOMY [place]OW  01/2019    revision       Hx of Blood transfusions/reactions: denies     Hx of abnormal bleeding or anti-platelet use: denies    Menstrual history: No LMP recorded. Patient has had an implant.    Steroid use in the last year: denies    Personal or FH with difficulty with Anesthesia:  Patient has a history of PONV, but has no family history of anesthesia complications.      Prior to Admission Medications  Current Outpatient  Medications   Medication Sig Dispense Refill     fluticasone (FLONASE) 50 MCG/ACT nasal spray Spray 1 spray into both nostrils 2 times daily        HYDROcodone-acetaminophen (NORCO) 5-325 MG tablet Take 1 tablet by mouth every 6 hours as needed for severe pain prn       ondansetron (ZOFRAN-ODT) 4 MG ODT tab Take 4 mg by mouth every 8 hours as needed          Allergies  No Known Allergies    Social History  Social History     Socioeconomic History     Marital status:      Spouse name: Not on file     Number of children: Not on file     Years of education: Not on file     Highest education level: Not on file   Occupational History     Not on file   Social Needs     Financial resource strain: Not on file     Food insecurity     Worry: Not on file     Inability: Not on file     Transportation needs     Medical: Not on file     Non-medical: Not on file   Tobacco Use     Smoking status: Never Smoker     Smokeless tobacco: Never Used   Substance and Sexual Activity     Alcohol use: Not Currently     Drug use: Never     Sexual activity: Not on file   Lifestyle     Physical activity     Days per week: Not on file     Minutes per session: Not on file     Stress: Not on file   Relationships     Social connections     Talks on phone: Not on file     Gets together: Not on file     Attends [employer] service: Not on file     Active member of club or organization: Not on file     Attends meetings of clubs or organizations: Not on file     Relationship status: Not on file     Intimate partner violence     Fear of current or ex partner: Not on file     Emotionally abused: Not on file     Physically abused: Not on file     Forced sexual activity: Not on file   Other Topics Concern     Not on file   Social History Narrative    Dottie lives in [place] with her family and 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       Family History  Family History   Problem Relation Age of Onset     Lymphoma Mother         non  "hodgkins lymphoma     Coronary Artery Disease Mother      Coronary Artery Disease Father      Diabetes No family hx of      Pancreatitis No family hx of      Anesthesia Reaction No family hx of      Deep Vein Thrombosis (DVT) No family hx of      ROS/MED HX    ENT/Pulmonary:     (+)sleep apnea, allergic rhinitis, doesn't use CPAP , . .   (-) tobacco use   Neurologic:     (+)migraines,     Cardiovascular:  - neg cardiovascular ROS      (-) taking anticoagulants/antiplatelets   METS/Exercise Tolerance:  >4 METS   Hematologic: Comments: H/o von willebrand        (-) History of Transfusion   Musculoskeletal:  - neg musculoskeletal ROS       GI/Hepatic: Comment: Chronic pancreatitis        Renal/Genitourinary:  - ROS Renal section negative       Endo: Comment: Pre diabetes        Psychiatric:  - neg psychiatric ROS       Infectious Disease:  - neg infectious disease ROS       Malignancy:      - no malignancy   Other:           The complete review of systems is negative other than noted in the HPI or here.   Temp: 98.3  F (36.8  C) Temp src: Oral BP: 123/87 Pulse: 94   Resp: 12 SpO2: 98 %         141 lbs 0 oz  5' 5\"[pt reported[   Body mass index is 23.46 kg/m .       Physical Exam  Constitutional: Awake, alert, cooperative, no apparent distress, and appears stated age.  Eyes: Pupils equal, round and reactive to light, extra ocular muscles intact, sclera clear, conjunctiva normal.  HENT: Normocephalic, oral pharynx with moist mucus membranes, good dentition. No goiter appreciated.   Respiratory: Clear to auscultation bilaterally, no crackles or wheezing.  Cardiovascular: Regular rate and rhythm, normal S1 and S2, and no murmur noted.  Carotids +2, no bruits. No edema. Palpable pulses to radial arteries.   GI: Normal bowel sounds, soft, non-distended, non-tender  Lymph/Hematologic: No cervical lymphadenopathy and no supraclavicular lymphadenopathy.  Genitourinary:  deferred  Skin: Warm and dry.  No rashes at anticipated " surgical site.   Musculoskeletal: Full ROM of neck. There is no redness, warmth, or swelling of the exposed joints. Gross motor strength is normal.    Neurologic: Awake, alert, oriented to name, place and time. Cranial nerves II-XII are grossly intact. Gait is normal.   Neuropsychiatric: Calm, cooperative. Normal affect.     Labs: (personally reviewed)  Component      Latest Ref Rng & Units 7/23/2020   WBC      4.0 - 11.0 10e9/L 5.2   RBC Count      3.8 - 5.2 10e12/L 5.14   Hemoglobin      11.7 - 15.7 g/dL 14.5   Hematocrit      35.0 - 47.0 % 44.3   MCV      78 - 100 fl 86   MCH      26.5 - 33.0 pg 28.2   MCHC      31.5 - 36.5 g/dL 32.7   RDW      10.0 - 15.0 % 12.4   Platelet Count      150 - 450 10e9/L 292   Diff Method       Automated Method   % Neutrophils      % 55.8   % Lymphocytes      % 35.5   % Monocytes      % 5.0   % Eosinophils      % 2.9   % Basophils      % 0.8   % Immature Granulocytes      % 0.0   Nucleated RBCs      0 /100 0   Absolute Neutrophil      1.6 - 8.3 10e9/L 2.9   Absolute Lymphocytes      0.8 - 5.3 10e9/L 1.9   Absolute Monocytes      0.0 - 1.3 10e9/L 0.3   Absolute Eosinophils      0.0 - 0.7 10e9/L 0.2   Absolute Basophils      0.0 - 0.2 10e9/L 0.0   Abs Immature Granulocytes      0 - 0.4 10e9/L 0.0   Absolute Nucleated RBC       0.0   Sodium      133 - 144 mmol/L 140   Potassium      3.4 - 5.3 mmol/L 3.6   Chloride      94 - 109 mmol/L 107   Carbon Dioxide      20 - 32 mmol/L 28   Anion Gap      3 - 14 mmol/L 4   Glucose      70 - 99 mg/dL 106 (H)   Urea Nitrogen      7 - 30 mg/dL 10   Creatinine      0.52 - 1.04 mg/dL 0.62   GFR Estimate      >60 mL/min/1.73:m2 >90   GFR Estimate If Black      >60 mL/min/1.73:m2 >90   Calcium      8.5 - 10.1 mg/dL 9.2   Bilirubin Total      0.2 - 1.3 mg/dL 0.4   Albumin      3.4 - 5.0 g/dL 4.5   Protein Total      6.8 - 8.8 g/dL 7.8   Alkaline Phosphatase      40 - 150 U/L 108   ALT      0 - 50 U/L 36   AST      0 - 45 U/L 17   ABO       O   RH(D)        Pos   Antibody Screen       Neg   Test Valid Only At       [facility]   Specimen Expires       07/26/2020   Blood Bank Comment       Preadmit form recd 7/23/20   INR      0.86 - 1.14 1.06   Hemoglobin A1C      0 - 5.6 % 6.0 (H)       Outside records reviewed from: care everywhere    ASSESSMENT and PLAN  Shaina Hernandez is a 54 year old female scheduled for Laproscopipc PANCREATECTOMY, TOTAL, WITH AUTOLOGOUS PANCREATIC ISLET CELL TRANSPLANT, Splenectomy, gastrojejunostomy placement on 7/27/20 by Dr. Jarvis in treatment of pancreatitis.  PAC referral for risk assessment and optimization for anesthesia with comorbid conditions of seasonal allergies, migraines, ANIBAL:    Pre-operative considerations:  1.  Cardiac:  Functional status- METS >4. No reported cardiac history.  Denies cardiac symptoms. high risk surgery with 0.9% (RCRI #) risk of major adverse cardiac event.   2.  Pulm:  Airway feasible.  ANIBAL- not yet using CPAP- reports she has an appointment scheduled for this fall when she returns to [place] to discuss treatment. Denies pulmonary symptoms. COVID testing in process.   3.  GI:  H/o PONV, anti-emetic intervention recommended. Chronic pancreatitis with above procedure planned.   4.  Neuro: migraines- uses Imitrex PRN.  States she has not needed in a couple months.   5. Heme: patient reports a history of von willebrand >20 years ago.  States no symptoms related since her children were born. Denies any bleeding complications with previous surgeries.      VTE risk: 0.26%    Patient is optimized and is acceptable candidate for the proposed procedure.  No further diagnostic evaluation is needed.     Hailey Miller PA-C  Preoperative Assessment Center  [facility]  [facility]  Phone: [phone]  Fax: [phone]

## 2020-07-24 NOTE — PATIENT INSTRUCTIONS
Preparing for Your Surgery      Name:  Dottie Hernandez   MRN:  [id number]   :  1965   Today's Date:  2020     -All surgical and procedural patient's are required to have a COVID-19 test within 4 days of your surgery/procedure.  You will be called to have this test scheduled; if you have not been called by scheduling within a week of surgery please call [phone] to schedule testing.    Arriving for surgery:  Surgery date:  20  Arrival time:  5 am    Restrictions due to COVID 19:  Patients are allowed one visitor in the pre-op period  All visitors must wear a mask  No visitors under 18  No ill visitors   parking is not available     Please come to:  [facility] [address]  [address] [id number]     -    Please proceed to the Surgery Lounge on the 3rd floor. [phone]?     - ?If you are in need of directions, wheelchair or escort please stop at the Information Desk in the lobby.  Inform the information person that you are here for surgery; a wheelchair and escort will be provided to the Surgery Lounge .?     What can I eat or drink?  -  You may eat and drink normally for up to 8 hours before your surgery. (Until 11:30 pm 20)  -  You may have clear liquids until 2 hours before surgery. (Until 5 am- Stop on arrival to hospital.)  Examples of clear liquids:  Water  Clear broth  Juices (apple, white grape, white cranberry  and cider) without pulp  Sodas (Sprite, 7-Up, ginger ale and seltzer)  Coffee or tea (without milk or cream)  Gatorade    -  No Alcohol for at least 24 hours before surgery     Which medicines can I take?  Hold Aspirin for 7 days before surgery.   Hold Multivitamins for 7 days before surgery.  Hold Supplements for 7 days before surgery.  Hold Ibuprofen (Advil, Motrin) for 1 day before surgery--unless otherwise directed by surgeon.  Hold Naproxen (Aleve) for 4 days before surgery.    -  PLEASE TAKE these medications  the day of surgery:  Flonase nasal spray,   Hydrocodone-Acetaminophen (Norco)  Ondansetron (Zofran) if needed    How do I prepare myself?  - Please take 2 showers before surgery using Scrubcare or Hibiclens soap.    Use this soap only from the neck to your toes.     Leave the soap on your skin for one minute--then rinse thoroughly.      You may use your own shampoo and conditioner; no other hair products.   - Please remove all jewelry and body piercings.  - No lotions, deodorants or fragrance.  - No makeup or fingernail polish.   - Bring your ID and insurance card.    ALL PATIENTS GOING HOME THE SAME DAY OF SURGERY ARE REQUIRED TO HAVE A RESPONSIBLE ADULT TO DRIVE AND BE IN ATTENDANCE WITH THEM FOR 24 HOURS FOLLOWING SURGERY     Questions or Concerns:    - For any questions regarding the day of surgery or your hospital stay, please contact the Pre Admission Nursing Office at [phone].       - If you have health changes between today and your surgery please call your surgeon.       For questions after surgery please call your surgeons office.     AFTER YOUR SURGERY  Breathing exercises   Breathing exercises help you recover faster. Take deep breaths and let the air out slowly. This will:     Help you wake up after surgery.    Help prevent complications like pneumonia.  Preventing complications will help you go home sooner.   We may give you a breathing device (incentive spirometer) to encourage you to breathe deeply.   Nausea and vomiting   You may feel sick to your stomach after surgery; if so, let your nurse know.    Pain control:  After surgery, you may have pain. Our goal is to help you manage your pain. Pain medicine will help you feel comfortable enough to do activities that will help you heal.  These activities may include breathing exercises, walking and physical therapy.   To help your health care team treat your pain we will ask: 1) If you have pain  2) where it is located 3) describe your pain in your  words  Methods of pain control include medications given by mouth, vein or by nerve block for some surgeries.  Sequential Compression Device (SCD):  You may need to wear SCD S (also called pneumo boots)on your legs or feet. These are wraps connected to a machine that pumps in air and releases it. The repeated pumping helps prevent blood clots from forming.

## 2020-07-25 LAB
GAD65 AB SER IA-ACNC: <5 IU/ML (ref 0–5)
SARS-COV-2 RNA SPEC QL NAA+PROBE: NOT DETECTED
SPECIMEN SOURCE: NORMAL

## 2020-07-26 LAB
A-TOCOPHEROL VIT E SERPL-MCNC: 8 MG/L (ref 5.5–18)
ANNOTATION COMMENT IMP: NORMAL
BETA+GAMMA TOCOPHEROL SERPL-MCNC: 1.7 MG/L (ref 0–6)
INSULIN HUMAN AB SER-ACNC: <0.4 U/ML (ref 0–0.4)
RETINYL PALMITATE SERPL-MCNC: <0.02 MG/L (ref 0–0.1)
VIT A SERPL-MCNC: 0.56 MG/L (ref 0.3–1.2)

## 2020-07-27 ENCOUNTER — HOSPITAL ENCOUNTER (INPATIENT)
Facility: CLINIC | Age: 55
LOS: 10 days | Discharge: HOME HEALTH | End: 2020-08-06
Attending: TRANSPLANT SURGERY | Admitting: TRANSPLANT SURGERY
Payer: COMMERCIAL

## 2020-07-27 ENCOUNTER — ANCILLARY PROCEDURE (OUTPATIENT)
Dept: ULTRASOUND IMAGING | Facility: CLINIC | Age: 55
End: 2020-07-27
Payer: COMMERCIAL

## 2020-07-27 ENCOUNTER — ANESTHESIA (OUTPATIENT)
Dept: SURGERY | Facility: CLINIC | Age: 55
End: 2020-07-27
Payer: COMMERCIAL

## 2020-07-27 DIAGNOSIS — Z78.9 ON ENTERAL NUTRITION: ICD-10-CM

## 2020-07-27 DIAGNOSIS — K86.1 IDIOPATHIC CHRONIC PANCREATITIS (H): Primary | ICD-10-CM

## 2020-07-27 DIAGNOSIS — K85.90 PANCREATITIS: ICD-10-CM

## 2020-07-27 DIAGNOSIS — Z90.410 POST-PANCREATECTOMY DIABETES (H): ICD-10-CM

## 2020-07-27 DIAGNOSIS — E89.1 POST-PANCREATECTOMY DIABETES (H): ICD-10-CM

## 2020-07-27 DIAGNOSIS — K86.1 CHRONIC BILIARY PANCREATITIS (H): ICD-10-CM

## 2020-07-27 DIAGNOSIS — E13.9 POST-PANCREATECTOMY DIABETES (H): ICD-10-CM

## 2020-07-27 DIAGNOSIS — Z90.410 ACQUIRED TOTAL ABSENCE OF PANCREAS: ICD-10-CM

## 2020-07-27 LAB
ABO + RH BLD: NORMAL
ABO + RH BLD: NORMAL
ALBUMIN SERPL-MCNC: 3 G/DL (ref 3.4–5)
ALP SERPL-CCNC: 39 U/L (ref 40–150)
ALT SERPL W P-5'-P-CCNC: 57 U/L (ref 0–50)
ANION GAP SERPL CALCULATED.3IONS-SCNC: 4 MMOL/L (ref 3–14)
APTT PPP: 31 SEC (ref 22–37)
AST SERPL W P-5'-P-CCNC: 89 U/L (ref 0–45)
BASE DEFICIT BLDA-SCNC: 0.3 MMOL/L
BASE DEFICIT BLDA-SCNC: 0.9 MMOL/L
BASE DEFICIT BLDA-SCNC: 1 MMOL/L
BASE DEFICIT BLDA-SCNC: 2.2 MMOL/L
BASE DEFICIT BLDA-SCNC: 2.3 MMOL/L
BASE EXCESS BLDA CALC-SCNC: 0.3 MMOL/L
BASE EXCESS BLDA CALC-SCNC: 0.7 MMOL/L
BILIRUB SERPL-MCNC: 0.6 MG/DL (ref 0.2–1.3)
BLD GP AB SCN SERPL QL: NORMAL
BLD PROD TYP BPU: NORMAL
BLD UNIT ID BPU: 0
BLD UNIT ID BPU: 0
BLOOD BANK CMNT PATIENT-IMP: NORMAL
BLOOD PRODUCT CODE: NORMAL
BLOOD PRODUCT CODE: NORMAL
BPU ID: NORMAL
BPU ID: NORMAL
BUN SERPL-MCNC: 7 MG/DL (ref 7–30)
CA-I BLD-MCNC: 4.1 MG/DL (ref 4.4–5.2)
CA-I BLD-MCNC: 4.3 MG/DL (ref 4.4–5.2)
CA-I BLD-MCNC: 4.4 MG/DL (ref 4.4–5.2)
CA-I BLD-MCNC: 4.4 MG/DL (ref 4.4–5.2)
CA-I BLD-MCNC: 4.5 MG/DL (ref 4.4–5.2)
CA-I BLD-MCNC: 4.7 MG/DL (ref 4.4–5.2)
CALCIUM SERPL-MCNC: 7.7 MG/DL (ref 8.5–10.1)
CHLORIDE SERPL-SCNC: 112 MMOL/L (ref 94–109)
CO2 SERPL-SCNC: 26 MMOL/L (ref 20–32)
CREAT SERPL-MCNC: 0.56 MG/DL (ref 0.52–1.04)
ERYTHROCYTE [DISTWIDTH] IN BLOOD BY AUTOMATED COUNT: 13.3 % (ref 10–15)
FIBRINOGEN PPP-MCNC: 199 MG/DL (ref 200–420)
GFR SERPL CREATININE-BSD FRML MDRD: >90 ML/MIN/{1.73_M2}
GLUCOSE BLD-MCNC: 102 MG/DL (ref 70–99)
GLUCOSE BLD-MCNC: 107 MG/DL (ref 70–99)
GLUCOSE BLD-MCNC: 115 MG/DL (ref 70–99)
GLUCOSE BLD-MCNC: 128 MG/DL (ref 70–99)
GLUCOSE BLD-MCNC: 134 MG/DL (ref 70–99)
GLUCOSE BLD-MCNC: 149 MG/DL (ref 70–99)
GLUCOSE BLD-MCNC: 151 MG/DL (ref 70–99)
GLUCOSE BLD-MCNC: 95 MG/DL (ref 70–99)
GLUCOSE BLDC GLUCOMTR-MCNC: 102 MG/DL (ref 70–99)
GLUCOSE BLDC GLUCOMTR-MCNC: 106 MG/DL (ref 70–99)
GLUCOSE BLDC GLUCOMTR-MCNC: 112 MG/DL (ref 70–99)
GLUCOSE BLDC GLUCOMTR-MCNC: 122 MG/DL (ref 70–99)
GLUCOSE BLDC GLUCOMTR-MCNC: 124 MG/DL (ref 70–99)
GLUCOSE BLDC GLUCOMTR-MCNC: 124 MG/DL (ref 70–99)
GLUCOSE BLDC GLUCOMTR-MCNC: 135 MG/DL (ref 70–99)
GLUCOSE BLDC GLUCOMTR-MCNC: 137 MG/DL (ref 70–99)
GLUCOSE BLDC GLUCOMTR-MCNC: 165 MG/DL (ref 70–99)
GLUCOSE BLDC GLUCOMTR-MCNC: 173 MG/DL (ref 70–99)
GLUCOSE BLDC GLUCOMTR-MCNC: 185 MG/DL (ref 70–99)
GLUCOSE BLDC GLUCOMTR-MCNC: 81 MG/DL (ref 70–99)
GLUCOSE BLDC GLUCOMTR-MCNC: 90 MG/DL (ref 70–99)
GLUCOSE BLDC GLUCOMTR-MCNC: 91 MG/DL (ref 70–99)
GLUCOSE BLDC GLUCOMTR-MCNC: 95 MG/DL (ref 70–99)
GLUCOSE BLDC GLUCOMTR-MCNC: 97 MG/DL (ref 70–99)
GLUCOSE SERPL-MCNC: 100 MG/DL (ref 70–99)
GRAM STN SPEC: NORMAL
HCO3 BLD-SCNC: 23 MMOL/L (ref 21–28)
HCO3 BLD-SCNC: 23 MMOL/L (ref 21–28)
HCO3 BLD-SCNC: 24 MMOL/L (ref 21–28)
HCO3 BLD-SCNC: 25 MMOL/L (ref 21–28)
HCT VFR BLD AUTO: 26.9 % (ref 35–47)
HGB BLD-MCNC: 10 G/DL (ref 11.7–15.7)
HGB BLD-MCNC: 10.2 G/DL (ref 11.7–15.7)
HGB BLD-MCNC: 10.5 G/DL (ref 11.7–15.7)
HGB BLD-MCNC: 11 G/DL (ref 11.7–15.7)
HGB BLD-MCNC: 7.9 G/DL (ref 11.7–15.7)
HGB BLD-MCNC: 8.8 G/DL (ref 11.7–15.7)
HGB BLD-MCNC: 8.9 G/DL (ref 11.7–15.7)
HGB BLD-MCNC: 9.6 G/DL (ref 11.7–15.7)
HGB BLD-MCNC: 9.8 G/DL (ref 11.7–15.7)
INR PPP: 1.68 (ref 0.86–1.14)
LACTATE BLD-SCNC: 0.6 MMOL/L (ref 0.7–2)
LACTATE BLD-SCNC: 0.7 MMOL/L (ref 0.7–2)
LACTATE BLD-SCNC: 0.8 MMOL/L (ref 0.7–2)
LACTATE BLD-SCNC: 0.8 MMOL/L (ref 0.7–2)
LACTATE BLD-SCNC: 0.9 MMOL/L (ref 0.7–2)
LACTATE BLD-SCNC: 1.1 MMOL/L (ref 0.7–2)
MCH RBC QN AUTO: 29 PG (ref 26.5–33)
MCHC RBC AUTO-ENTMCNC: 32.7 G/DL (ref 31.5–36.5)
MCV RBC AUTO: 89 FL (ref 78–100)
NUM BPU REQUESTED: 4
O2/TOTAL GAS SETTING VFR VENT: 34 %
O2/TOTAL GAS SETTING VFR VENT: 35 %
O2/TOTAL GAS SETTING VFR VENT: 35 %
O2/TOTAL GAS SETTING VFR VENT: 44 %
O2/TOTAL GAS SETTING VFR VENT: 45 %
OXYHGB MFR BLD: 98 % (ref 92–100)
OXYHGB MFR BLD: 99 % (ref 92–100)
PCO2 BLD: 32 MM HG (ref 35–45)
PCO2 BLD: 37 MM HG (ref 35–45)
PCO2 BLD: 38 MM HG (ref 35–45)
PCO2 BLD: 39 MM HG (ref 35–45)
PCO2 BLD: 39 MM HG (ref 35–45)
PCO2 BLD: 40 MM HG (ref 35–45)
PCO2 BLD: 40 MM HG (ref 35–45)
PH BLD: 7.37 PH (ref 7.35–7.45)
PH BLD: 7.38 PH (ref 7.35–7.45)
PH BLD: 7.39 PH (ref 7.35–7.45)
PH BLD: 7.4 PH (ref 7.35–7.45)
PH BLD: 7.41 PH (ref 7.35–7.45)
PH BLD: 7.42 PH (ref 7.35–7.45)
PH BLD: 7.48 PH (ref 7.35–7.45)
PLATELET # BLD AUTO: 149 10E9/L (ref 150–450)
PO2 BLD: 143 MM HG (ref 80–105)
PO2 BLD: 164 MM HG (ref 80–105)
PO2 BLD: 168 MM HG (ref 80–105)
PO2 BLD: 171 MM HG (ref 80–105)
PO2 BLD: 206 MM HG (ref 80–105)
PO2 BLD: 221 MM HG (ref 80–105)
PO2 BLD: 226 MM HG (ref 80–105)
POTASSIUM BLD-SCNC: 3.6 MMOL/L (ref 3.4–5.3)
POTASSIUM BLD-SCNC: 3.7 MMOL/L (ref 3.4–5.3)
POTASSIUM BLD-SCNC: 3.7 MMOL/L (ref 3.4–5.3)
POTASSIUM BLD-SCNC: 3.8 MMOL/L (ref 3.4–5.3)
POTASSIUM BLD-SCNC: 4 MMOL/L (ref 3.4–5.3)
POTASSIUM BLD-SCNC: 4.1 MMOL/L (ref 3.4–5.3)
POTASSIUM BLD-SCNC: 4.2 MMOL/L (ref 3.4–5.3)
POTASSIUM BLD-SCNC: 4.2 MMOL/L (ref 3.4–5.3)
POTASSIUM SERPL-SCNC: 4.2 MMOL/L (ref 3.4–5.3)
PROT SERPL-MCNC: 4.4 G/DL (ref 6.8–8.8)
RBC # BLD AUTO: 3.03 10E12/L (ref 3.8–5.2)
SODIUM BLD-SCNC: 139 MMOL/L (ref 133–144)
SODIUM BLD-SCNC: 140 MMOL/L (ref 133–144)
SODIUM BLD-SCNC: 140 MMOL/L (ref 133–144)
SODIUM BLD-SCNC: 141 MMOL/L (ref 133–144)
SODIUM BLD-SCNC: 142 MMOL/L (ref 133–144)
SODIUM BLD-SCNC: 143 MMOL/L (ref 133–144)
SODIUM SERPL-SCNC: 142 MMOL/L (ref 133–144)
SPECIMEN EXP DATE BLD: NORMAL
SPECIMEN SOURCE: NORMAL
SPECIMEN SOURCE: NORMAL
TRANSFUSION STATUS PATIENT QL: NORMAL
WBC # BLD AUTO: 10.9 10E9/L (ref 4–11)

## 2020-07-27 PROCEDURE — 82810 BLOOD GASES O2 SAT ONLY: CPT

## 2020-07-27 PROCEDURE — 36000070 ZZH SURGERY LEVEL 5 EA 15 ADDTL MIN - UMMC: Performed by: TRANSPLANT SURGERY

## 2020-07-27 PROCEDURE — 86850 RBC ANTIBODY SCREEN: CPT | Performed by: ANESTHESIOLOGY

## 2020-07-27 PROCEDURE — 87186 SC STD MICRODIL/AGAR DIL: CPT | Performed by: TRANSPLANT SURGERY

## 2020-07-27 PROCEDURE — 40000556 ZZH STATISTIC PERIPHERAL IV START W US GUIDANCE

## 2020-07-27 PROCEDURE — 25800030 ZZH RX IP 258 OP 636: Performed by: SURGERY

## 2020-07-27 PROCEDURE — 87205 SMEAR GRAM STAIN: CPT | Performed by: TRANSPLANT SURGERY

## 2020-07-27 PROCEDURE — 37000008 ZZH ANESTHESIA TECHNICAL FEE, 1ST 30 MIN: Performed by: TRANSPLANT SURGERY

## 2020-07-27 PROCEDURE — 25000125 ZZHC RX 250

## 2020-07-27 PROCEDURE — 82330 ASSAY OF CALCIUM: CPT

## 2020-07-27 PROCEDURE — 87077 CULTURE AEROBIC IDENTIFY: CPT | Performed by: TRANSPLANT SURGERY

## 2020-07-27 PROCEDURE — 87015 SPECIMEN INFECT AGNT CONCNTJ: CPT | Performed by: TRANSPLANT SURGERY

## 2020-07-27 PROCEDURE — 85018 HEMOGLOBIN: CPT

## 2020-07-27 PROCEDURE — 25000125 ZZHC RX 250: Performed by: SURGERY

## 2020-07-27 PROCEDURE — 25000128 H RX IP 250 OP 636: Performed by: PHYSICIAN ASSISTANT

## 2020-07-27 PROCEDURE — 40000014 ZZH STATISTIC ARTERIAL MONITORING DAILY

## 2020-07-27 PROCEDURE — 99291 CRITICAL CARE FIRST HOUR: CPT | Mod: GC | Performed by: INTERNAL MEDICINE

## 2020-07-27 PROCEDURE — 25000125 ZZHC RX 250: Performed by: NURSE ANESTHETIST, CERTIFIED REGISTERED

## 2020-07-27 PROCEDURE — 25800030 ZZH RX IP 258 OP 636: Performed by: TRANSPLANT SURGERY

## 2020-07-27 PROCEDURE — 00000146 ZZHCL STATISTIC GLUCOSE BY METER IP

## 2020-07-27 PROCEDURE — 25800030 ZZH RX IP 258 OP 636: Performed by: NURSE ANESTHETIST, CERTIFIED REGISTERED

## 2020-07-27 PROCEDURE — 85610 PROTHROMBIN TIME: CPT | Performed by: SURGERY

## 2020-07-27 PROCEDURE — 25000128 H RX IP 250 OP 636: Performed by: SURGERY

## 2020-07-27 PROCEDURE — 0FTG0ZZ RESECTION OF PANCREAS, OPEN APPROACH: ICD-10-PCS | Performed by: TRANSPLANT SURGERY

## 2020-07-27 PROCEDURE — 87103 BLOOD FUNGUS CULTURE: CPT | Performed by: TRANSPLANT SURGERY

## 2020-07-27 PROCEDURE — 84132 ASSAY OF SERUM POTASSIUM: CPT

## 2020-07-27 PROCEDURE — 27110038 ZZH RX 271

## 2020-07-27 PROCEDURE — 36000068 ZZH SURGERY LEVEL 5 1ST 30 MIN - UMMC: Performed by: TRANSPLANT SURGERY

## 2020-07-27 PROCEDURE — 36415 COLL VENOUS BLD VENIPUNCTURE: CPT | Performed by: SURGERY

## 2020-07-27 PROCEDURE — 25000125 ZZHC RX 250: Performed by: TRANSPLANT SURGERY

## 2020-07-27 PROCEDURE — P9041 ALBUMIN (HUMAN),5%, 50ML: HCPCS | Performed by: NURSE ANESTHETIST, CERTIFIED REGISTERED

## 2020-07-27 PROCEDURE — 40000196 ZZH STATISTIC RAPCV CVP MONITORING

## 2020-07-27 PROCEDURE — 82947 ASSAY GLUCOSE BLOOD QUANT: CPT

## 2020-07-27 PROCEDURE — 3E033U0 INTRODUCTION OF AUTOLOGOUS PANCREATIC ISLET CELLS INTO PERIPHERAL VEIN, PERCUTANEOUS APPROACH: ICD-10-PCS | Performed by: TRANSPLANT SURGERY

## 2020-07-27 PROCEDURE — 84295 ASSAY OF SERUM SODIUM: CPT

## 2020-07-27 PROCEDURE — 20000004 ZZH R&B ICU UMMC

## 2020-07-27 PROCEDURE — P9016 RBC LEUKOCYTES REDUCED: HCPCS | Performed by: ANESTHESIOLOGY

## 2020-07-27 PROCEDURE — 0DB60ZZ EXCISION OF STOMACH, OPEN APPROACH: ICD-10-PCS | Performed by: TRANSPLANT SURGERY

## 2020-07-27 PROCEDURE — 71000016 ZZH RECOVERY PHASE 1 LEVEL 3 FIRST HR: Performed by: TRANSPLANT SURGERY

## 2020-07-27 PROCEDURE — 25000566 ZZH SEVOFLURANE, EA 15 MIN: Performed by: TRANSPLANT SURGERY

## 2020-07-27 PROCEDURE — 25000128 H RX IP 250 OP 636: Performed by: TRANSPLANT SURGERY

## 2020-07-27 PROCEDURE — 25000128 H RX IP 250 OP 636: Performed by: NURSE ANESTHETIST, CERTIFIED REGISTERED

## 2020-07-27 PROCEDURE — 37000009 ZZH ANESTHESIA TECHNICAL FEE, EACH ADDTL 15 MIN: Performed by: TRANSPLANT SURGERY

## 2020-07-27 PROCEDURE — 86901 BLOOD TYPING SEROLOGIC RH(D): CPT | Performed by: ANESTHESIOLOGY

## 2020-07-27 PROCEDURE — 87040 BLOOD CULTURE FOR BACTERIA: CPT | Performed by: TRANSPLANT SURGERY

## 2020-07-27 PROCEDURE — 88307 TISSUE EXAM BY PATHOLOGIST: CPT | Performed by: TRANSPLANT SURGERY

## 2020-07-27 PROCEDURE — 86923 COMPATIBILITY TEST ELECTRIC: CPT | Performed by: ANESTHESIOLOGY

## 2020-07-27 PROCEDURE — 85730 THROMBOPLASTIN TIME PARTIAL: CPT | Performed by: SURGERY

## 2020-07-27 PROCEDURE — 25000128 H RX IP 250 OP 636: Performed by: ANESTHESIOLOGY

## 2020-07-27 PROCEDURE — 81100006 ZZH ACQUISITON PANCREAS AUTOLOGOUS ISLET

## 2020-07-27 PROCEDURE — 0FB00ZX EXCISION OF LIVER, OPEN APPROACH, DIAGNOSTIC: ICD-10-PCS | Performed by: TRANSPLANT SURGERY

## 2020-07-27 PROCEDURE — 86900 BLOOD TYPING SEROLOGIC ABO: CPT | Performed by: ANESTHESIOLOGY

## 2020-07-27 PROCEDURE — 40000275 ZZH STATISTIC RCP TIME EA 10 MIN

## 2020-07-27 PROCEDURE — 40000171 ZZH STATISTIC PRE-PROCEDURE ASSESSMENT III: Performed by: TRANSPLANT SURGERY

## 2020-07-27 PROCEDURE — 25000128 H RX IP 250 OP 636: Performed by: NURSE PRACTITIONER

## 2020-07-27 PROCEDURE — 88305 TISSUE EXAM BY PATHOLOGIST: CPT | Performed by: TRANSPLANT SURGERY

## 2020-07-27 PROCEDURE — 71000017 ZZH RECOVERY PHASE 1 LEVEL 3 EA ADDTL HR: Performed by: TRANSPLANT SURGERY

## 2020-07-27 PROCEDURE — 07TP0ZZ RESECTION OF SPLEEN, OPEN APPROACH: ICD-10-PCS | Performed by: TRANSPLANT SURGERY

## 2020-07-27 PROCEDURE — 80053 COMPREHEN METABOLIC PANEL: CPT | Performed by: SURGERY

## 2020-07-27 PROCEDURE — 82803 BLOOD GASES ANY COMBINATION: CPT

## 2020-07-27 PROCEDURE — 25000128 H RX IP 250 OP 636: Performed by: STUDENT IN AN ORGANIZED HEALTH CARE EDUCATION/TRAINING PROGRAM

## 2020-07-27 PROCEDURE — 27210794 ZZH OR GENERAL SUPPLY STERILE: Performed by: TRANSPLANT SURGERY

## 2020-07-27 PROCEDURE — 83605 ASSAY OF LACTIC ACID: CPT

## 2020-07-27 PROCEDURE — 85384 FIBRINOGEN ACTIVITY: CPT | Performed by: SURGERY

## 2020-07-27 PROCEDURE — 85027 COMPLETE CBC AUTOMATED: CPT | Performed by: SURGERY

## 2020-07-27 PROCEDURE — 25800030 ZZH RX IP 258 OP 636: Performed by: ANESTHESIOLOGY

## 2020-07-27 PROCEDURE — 25000125 ZZHC RX 250: Performed by: PHYSICIAN ASSISTANT

## 2020-07-27 DEVICE — IMPLANTABLE DEVICE: Type: IMPLANTABLE DEVICE | Site: BILE DUCT | Status: FUNCTIONAL

## 2020-07-27 RX ORDER — SODIUM CHLORIDE, SODIUM LACTATE, POTASSIUM CHLORIDE, CALCIUM CHLORIDE 600; 310; 30; 20 MG/100ML; MG/100ML; MG/100ML; MG/100ML
INJECTION, SOLUTION INTRAVENOUS CONTINUOUS
Status: DISCONTINUED | OUTPATIENT
Start: 2020-07-27 | End: 2020-07-28

## 2020-07-27 RX ORDER — OXYCODONE HYDROCHLORIDE 5 MG/1
5 TABLET ORAL EVERY 4 HOURS PRN
Status: DISCONTINUED | OUTPATIENT
Start: 2020-07-27 | End: 2020-07-27

## 2020-07-27 RX ORDER — SODIUM CHLORIDE, SODIUM LACTATE, POTASSIUM CHLORIDE, CALCIUM CHLORIDE 600; 310; 30; 20 MG/100ML; MG/100ML; MG/100ML; MG/100ML
INJECTION, SOLUTION INTRAVENOUS CONTINUOUS PRN
Status: DISCONTINUED | OUTPATIENT
Start: 2020-07-27 | End: 2020-07-27

## 2020-07-27 RX ORDER — ONDANSETRON 2 MG/ML
INJECTION INTRAMUSCULAR; INTRAVENOUS PRN
Status: DISCONTINUED | OUTPATIENT
Start: 2020-07-27 | End: 2020-07-27

## 2020-07-27 RX ORDER — FENTANYL CITRATE 50 UG/ML
INJECTION, SOLUTION INTRAMUSCULAR; INTRAVENOUS PRN
Status: DISCONTINUED | OUTPATIENT
Start: 2020-07-27 | End: 2020-07-27

## 2020-07-27 RX ORDER — DEXMEDETOMIDINE HYDROCHLORIDE 4 UG/ML
0.2-1.2 INJECTION, SOLUTION INTRAVENOUS CONTINUOUS
Status: DISCONTINUED | OUTPATIENT
Start: 2020-07-27 | End: 2020-07-27 | Stop reason: CLARIF

## 2020-07-27 RX ORDER — HEPARIN SODIUM 1000 [USP'U]/ML
INJECTION, SOLUTION INTRAVENOUS; SUBCUTANEOUS PRN
Status: DISCONTINUED | OUTPATIENT
Start: 2020-07-27 | End: 2020-07-27

## 2020-07-27 RX ORDER — FENTANYL CITRATE 50 UG/ML
25-50 INJECTION, SOLUTION INTRAMUSCULAR; INTRAVENOUS
Status: DISCONTINUED | OUTPATIENT
Start: 2020-07-27 | End: 2020-07-27 | Stop reason: HOSPADM

## 2020-07-27 RX ORDER — ONDANSETRON 4 MG/1
4 TABLET, ORALLY DISINTEGRATING ORAL EVERY 6 HOURS PRN
Status: DISCONTINUED | OUTPATIENT
Start: 2020-07-27 | End: 2020-08-06 | Stop reason: HOSPADM

## 2020-07-27 RX ORDER — HEPARIN SODIUM 10000 [USP'U]/100ML
400 INJECTION, SOLUTION INTRAVENOUS CONTINUOUS
Status: DISCONTINUED | OUTPATIENT
Start: 2020-07-28 | End: 2020-07-28

## 2020-07-27 RX ORDER — LIDOCAINE 40 MG/G
CREAM TOPICAL
Status: DISCONTINUED | OUTPATIENT
Start: 2020-07-27 | End: 2020-07-27

## 2020-07-27 RX ORDER — NALOXONE HYDROCHLORIDE 0.4 MG/ML
.1-.4 INJECTION, SOLUTION INTRAMUSCULAR; INTRAVENOUS; SUBCUTANEOUS
Status: DISCONTINUED | OUTPATIENT
Start: 2020-07-27 | End: 2020-08-06 | Stop reason: HOSPADM

## 2020-07-27 RX ORDER — PROPOFOL 10 MG/ML
INJECTION, EMULSION INTRAVENOUS PRN
Status: DISCONTINUED | OUTPATIENT
Start: 2020-07-27 | End: 2020-07-27

## 2020-07-27 RX ORDER — GABAPENTIN 250 MG/5ML
300 SOLUTION ORAL 2 TIMES DAILY
Status: DISCONTINUED | OUTPATIENT
Start: 2020-07-28 | End: 2020-07-30

## 2020-07-27 RX ORDER — FLUMAZENIL 0.1 MG/ML
0.2 INJECTION, SOLUTION INTRAVENOUS
Status: DISCONTINUED | OUTPATIENT
Start: 2020-07-27 | End: 2020-07-27

## 2020-07-27 RX ORDER — FENTANYL CITRATE 50 UG/ML
25-50 INJECTION, SOLUTION INTRAMUSCULAR; INTRAVENOUS
Status: DISCONTINUED | OUTPATIENT
Start: 2020-07-27 | End: 2020-07-27

## 2020-07-27 RX ORDER — ERTAPENEM 1 G/1
1 INJECTION, POWDER, LYOPHILIZED, FOR SOLUTION INTRAMUSCULAR; INTRAVENOUS
Status: COMPLETED | OUTPATIENT
Start: 2020-07-27 | End: 2020-07-27

## 2020-07-27 RX ORDER — ALBUMIN, HUMAN INJ 5% 5 %
SOLUTION INTRAVENOUS CONTINUOUS PRN
Status: DISCONTINUED | OUTPATIENT
Start: 2020-07-27 | End: 2020-07-27

## 2020-07-27 RX ORDER — ACETAMINOPHEN 325 MG/1
975 TABLET ORAL ONCE
Status: DISCONTINUED | OUTPATIENT
Start: 2020-07-27 | End: 2020-07-27 | Stop reason: HOSPADM

## 2020-07-27 RX ORDER — ONDANSETRON 2 MG/ML
4 INJECTION INTRAMUSCULAR; INTRAVENOUS EVERY 6 HOURS PRN
Status: DISCONTINUED | OUTPATIENT
Start: 2020-07-27 | End: 2020-08-06 | Stop reason: HOSPADM

## 2020-07-27 RX ORDER — NALOXONE HYDROCHLORIDE 0.4 MG/ML
.1-.4 INJECTION, SOLUTION INTRAMUSCULAR; INTRAVENOUS; SUBCUTANEOUS
Status: DISCONTINUED | OUTPATIENT
Start: 2020-07-27 | End: 2020-07-27

## 2020-07-27 RX ORDER — ONDANSETRON 4 MG/1
4 TABLET, ORALLY DISINTEGRATING ORAL EVERY 30 MIN PRN
Status: DISCONTINUED | OUTPATIENT
Start: 2020-07-27 | End: 2020-07-27 | Stop reason: HOSPADM

## 2020-07-27 RX ORDER — ONDANSETRON 2 MG/ML
4 INJECTION INTRAMUSCULAR; INTRAVENOUS EVERY 30 MIN PRN
Status: DISCONTINUED | OUTPATIENT
Start: 2020-07-27 | End: 2020-07-27 | Stop reason: HOSPADM

## 2020-07-27 RX ORDER — ERTAPENEM 1 G/1
1 INJECTION, POWDER, LYOPHILIZED, FOR SOLUTION INTRAMUSCULAR; INTRAVENOUS EVERY 8 HOURS PRN
Status: DISCONTINUED | OUTPATIENT
Start: 2020-07-27 | End: 2020-07-27

## 2020-07-27 RX ORDER — KETAMINE HYDROCHLORIDE 10 MG/ML
5-10 INJECTION, SOLUTION INTRAMUSCULAR; INTRAVENOUS EVERY 6 HOURS
Status: DISCONTINUED | OUTPATIENT
Start: 2020-07-28 | End: 2020-07-29

## 2020-07-27 RX ORDER — SODIUM CHLORIDE, SODIUM GLUCONATE, SODIUM ACETATE, POTASSIUM CHLORIDE AND MAGNESIUM CHLORIDE 526; 502; 368; 37; 30 MG/100ML; MG/100ML; MG/100ML; MG/100ML; MG/100ML
INJECTION, SOLUTION INTRAVENOUS CONTINUOUS PRN
Status: DISCONTINUED | OUTPATIENT
Start: 2020-07-27 | End: 2020-07-27

## 2020-07-27 RX ORDER — LIDOCAINE HYDROCHLORIDE 20 MG/ML
INJECTION, SOLUTION INFILTRATION; PERINEURAL PRN
Status: DISCONTINUED | OUTPATIENT
Start: 2020-07-27 | End: 2020-07-27

## 2020-07-27 RX ORDER — LIDOCAINE 40 MG/G
CREAM TOPICAL
Status: DISCONTINUED | OUTPATIENT
Start: 2020-07-27 | End: 2020-07-28

## 2020-07-27 RX ORDER — HYDROMORPHONE HYDROCHLORIDE 1 MG/ML
.3-.5 INJECTION, SOLUTION INTRAMUSCULAR; INTRAVENOUS; SUBCUTANEOUS EVERY 5 MIN PRN
Status: DISCONTINUED | OUTPATIENT
Start: 2020-07-27 | End: 2020-07-27 | Stop reason: HOSPADM

## 2020-07-27 RX ORDER — OXYCODONE HCL 5 MG/5 ML
5 SOLUTION, ORAL ORAL EVERY 4 HOURS PRN
Status: DISCONTINUED | OUTPATIENT
Start: 2020-07-27 | End: 2020-07-29

## 2020-07-27 RX ORDER — BISACODYL 5 MG/1
5 TABLET, DELAYED RELEASE ORAL DAILY PRN
Status: ON HOLD | COMMUNITY
End: 2020-08-06

## 2020-07-27 RX ORDER — LORAZEPAM 2 MG/ML
0.5 INJECTION INTRAMUSCULAR EVERY 6 HOURS
Status: DISCONTINUED | OUTPATIENT
Start: 2020-07-27 | End: 2020-07-29

## 2020-07-27 RX ORDER — ERTAPENEM 1 G/1
1 INJECTION, POWDER, LYOPHILIZED, FOR SOLUTION INTRAMUSCULAR; INTRAVENOUS ONCE
Status: COMPLETED | OUTPATIENT
Start: 2020-07-28 | End: 2020-07-28

## 2020-07-27 RX ORDER — DEXTROSE MONOHYDRATE 100 MG/ML
INJECTION, SOLUTION INTRAVENOUS CONTINUOUS PRN
Status: DISCONTINUED | OUTPATIENT
Start: 2020-07-27 | End: 2020-08-04 | Stop reason: DRUGHIGH

## 2020-07-27 RX ORDER — NICOTINE POLACRILEX 4 MG
15-30 LOZENGE BUCCAL
Status: DISCONTINUED | OUTPATIENT
Start: 2020-07-27 | End: 2020-08-06 | Stop reason: HOSPADM

## 2020-07-27 RX ORDER — FLUCONAZOLE 2 MG/ML
400 INJECTION, SOLUTION INTRAVENOUS EVERY 24 HOURS
Status: DISCONTINUED | OUTPATIENT
Start: 2020-07-27 | End: 2020-07-31

## 2020-07-27 RX ORDER — LIDOCAINE 40 MG/G
CREAM TOPICAL
Status: DISCONTINUED | OUTPATIENT
Start: 2020-07-27 | End: 2020-08-06 | Stop reason: HOSPADM

## 2020-07-27 RX ORDER — DEXTROSE MONOHYDRATE 25 G/50ML
25-50 INJECTION, SOLUTION INTRAVENOUS
Status: DISCONTINUED | OUTPATIENT
Start: 2020-07-27 | End: 2020-08-06 | Stop reason: HOSPADM

## 2020-07-27 RX ORDER — DEXMEDETOMIDINE HYDROCHLORIDE 4 UG/ML
0.2-0.7 INJECTION, SOLUTION INTRAVENOUS CONTINUOUS
Status: DISCONTINUED | OUTPATIENT
Start: 2020-07-27 | End: 2020-07-29

## 2020-07-27 RX ORDER — PROCHLORPERAZINE MALEATE 5 MG
10 TABLET ORAL EVERY 6 HOURS PRN
Status: DISCONTINUED | OUTPATIENT
Start: 2020-07-27 | End: 2020-08-06 | Stop reason: HOSPADM

## 2020-07-27 RX ORDER — CALCIUM CHLORIDE 100 MG/ML
INJECTION INTRAVENOUS; INTRAVENTRICULAR PRN
Status: DISCONTINUED | OUTPATIENT
Start: 2020-07-27 | End: 2020-07-27

## 2020-07-27 RX ORDER — NALOXONE HYDROCHLORIDE 0.4 MG/ML
.1-.4 INJECTION, SOLUTION INTRAMUSCULAR; INTRAVENOUS; SUBCUTANEOUS
Status: DISCONTINUED | OUTPATIENT
Start: 2020-07-27 | End: 2020-07-28

## 2020-07-27 RX ORDER — SODIUM CHLORIDE, SODIUM LACTATE, POTASSIUM CHLORIDE, CALCIUM CHLORIDE 600; 310; 30; 20 MG/100ML; MG/100ML; MG/100ML; MG/100ML
INJECTION, SOLUTION INTRAVENOUS CONTINUOUS
Status: DISCONTINUED | OUTPATIENT
Start: 2020-07-27 | End: 2020-07-27 | Stop reason: HOSPADM

## 2020-07-27 RX ORDER — DEXTROSE, SODIUM CHLORIDE, SODIUM LACTATE, POTASSIUM CHLORIDE, AND CALCIUM CHLORIDE 5; .6; .31; .03; .02 G/100ML; G/100ML; G/100ML; G/100ML; G/100ML
INJECTION, SOLUTION INTRAVENOUS CONTINUOUS
Status: DISCONTINUED | OUTPATIENT
Start: 2020-07-27 | End: 2020-07-31

## 2020-07-27 RX ORDER — HYDROXYZINE HCL 10 MG/5 ML
25 SOLUTION, ORAL ORAL EVERY 6 HOURS PRN
Status: DISCONTINUED | OUTPATIENT
Start: 2020-07-27 | End: 2020-08-05

## 2020-07-27 RX ADMIN — PHENYLEPHRINE HYDROCHLORIDE 100 MCG: 10 INJECTION INTRAVENOUS at 10:21

## 2020-07-27 RX ADMIN — ROCURONIUM BROMIDE 30 MG: 10 INJECTION INTRAVENOUS at 12:51

## 2020-07-27 RX ADMIN — HUMAN INSULIN 1 UNITS/HR: 100 INJECTION, SOLUTION SUBCUTANEOUS at 10:01

## 2020-07-27 RX ADMIN — SODIUM CHLORIDE, SODIUM GLUCONATE, SODIUM ACETATE, POTASSIUM CHLORIDE AND MAGNESIUM CHLORIDE: 526; 502; 368; 37; 30 INJECTION, SOLUTION INTRAVENOUS at 10:48

## 2020-07-27 RX ADMIN — SODIUM CHLORIDE 0.2 MCG/KG/HR: 9 INJECTION, SOLUTION INTRAVENOUS at 19:46

## 2020-07-27 RX ADMIN — PHENYLEPHRINE HYDROCHLORIDE 100 MCG: 10 INJECTION INTRAVENOUS at 16:57

## 2020-07-27 RX ADMIN — FENTANYL CITRATE 50 MCG: 50 INJECTION, SOLUTION INTRAMUSCULAR; INTRAVENOUS at 18:27

## 2020-07-27 RX ADMIN — Medication: at 21:06

## 2020-07-27 RX ADMIN — FLUCONAZOLE IN SODIUM CHLORIDE 400 MG: 2 INJECTION, SOLUTION INTRAVENOUS at 07:15

## 2020-07-27 RX ADMIN — HYDROMORPHONE HYDROCHLORIDE 0.5 MG: 1 INJECTION, SOLUTION INTRAMUSCULAR; INTRAVENOUS; SUBCUTANEOUS at 17:57

## 2020-07-27 RX ADMIN — FENTANYL CITRATE 50 MCG: 50 INJECTION, SOLUTION INTRAMUSCULAR; INTRAVENOUS at 18:45

## 2020-07-27 RX ADMIN — HUMAN INSULIN 4 UNITS/HR: 100 INJECTION, SOLUTION SUBCUTANEOUS at 21:34

## 2020-07-27 RX ADMIN — FENTANYL CITRATE 50 MCG: 50 INJECTION, SOLUTION INTRAMUSCULAR; INTRAVENOUS at 19:19

## 2020-07-27 RX ADMIN — PHENYLEPHRINE HYDROCHLORIDE 100 MCG: 10 INJECTION INTRAVENOUS at 12:57

## 2020-07-27 RX ADMIN — PHENYLEPHRINE HYDROCHLORIDE 100 MCG: 10 INJECTION INTRAVENOUS at 14:53

## 2020-07-27 RX ADMIN — PHENYLEPHRINE HYDROCHLORIDE 100 MCG: 10 INJECTION INTRAVENOUS at 10:59

## 2020-07-27 RX ADMIN — LORAZEPAM 0.5 MG: 2 INJECTION INTRAMUSCULAR; INTRAVENOUS at 23:19

## 2020-07-27 RX ADMIN — ERTAPENEM SODIUM 1 G: 1 INJECTION, POWDER, LYOPHILIZED, FOR SOLUTION INTRAMUSCULAR; INTRAVENOUS at 23:31

## 2020-07-27 RX ADMIN — HUMAN INSULIN 0.1 UNITS/HR: 100 INJECTION, SOLUTION SUBCUTANEOUS at 20:00

## 2020-07-27 RX ADMIN — FENTANYL CITRATE 50 MCG: 50 INJECTION, SOLUTION INTRAMUSCULAR; INTRAVENOUS at 19:09

## 2020-07-27 RX ADMIN — ROCURONIUM BROMIDE 50 MG: 10 INJECTION INTRAVENOUS at 10:16

## 2020-07-27 RX ADMIN — ROCURONIUM BROMIDE 30 MG: 10 INJECTION INTRAVENOUS at 08:38

## 2020-07-27 RX ADMIN — PROCHLORPERAZINE EDISYLATE 10 MG: 5 INJECTION INTRAMUSCULAR; INTRAVENOUS at 18:55

## 2020-07-27 RX ADMIN — SODIUM CHLORIDE 0.3 MCG/KG/HR: 9 INJECTION, SOLUTION INTRAVENOUS at 08:21

## 2020-07-27 RX ADMIN — PHENYLEPHRINE HYDROCHLORIDE 100 MCG: 10 INJECTION INTRAVENOUS at 12:37

## 2020-07-27 RX ADMIN — SODIUM CHLORIDE, POTASSIUM CHLORIDE, SODIUM LACTATE AND CALCIUM CHLORIDE: 600; 310; 30; 20 INJECTION, SOLUTION INTRAVENOUS at 08:21

## 2020-07-27 RX ADMIN — ALBUMIN HUMAN: 0.05 INJECTION, SOLUTION INTRAVENOUS at 10:27

## 2020-07-27 RX ADMIN — ERTAPENEM SODIUM 1 G: 1 INJECTION, POWDER, LYOPHILIZED, FOR SOLUTION INTRAMUSCULAR; INTRAVENOUS at 08:00

## 2020-07-27 RX ADMIN — PHENYLEPHRINE HYDROCHLORIDE 100 MCG: 10 INJECTION INTRAVENOUS at 10:47

## 2020-07-27 RX ADMIN — ROCURONIUM BROMIDE 20 MG: 10 INJECTION INTRAVENOUS at 16:52

## 2020-07-27 RX ADMIN — SUFENTANIL CITRATE 0.2 MCG/KG/HR: 50 INJECTION EPIDURAL; INTRAVENOUS at 08:21

## 2020-07-27 RX ADMIN — Medication 7 ML/HR: at 10:30

## 2020-07-27 RX ADMIN — PROPOFOL 100 MG: 10 INJECTION, EMULSION INTRAVENOUS at 07:53

## 2020-07-27 RX ADMIN — HEPARIN SODIUM 2000 UNITS: 1000 INJECTION INTRAVENOUS; SUBCUTANEOUS at 17:01

## 2020-07-27 RX ADMIN — ROCURONIUM BROMIDE 20 MG: 10 INJECTION INTRAVENOUS at 09:32

## 2020-07-27 RX ADMIN — CALCIUM CHLORIDE 500 MG: 100 INJECTION, SOLUTION INTRAVENOUS at 15:08

## 2020-07-27 RX ADMIN — PHENYLEPHRINE HYDROCHLORIDE 50 MCG: 10 INJECTION INTRAVENOUS at 10:17

## 2020-07-27 RX ADMIN — HYDROMORPHONE HYDROCHLORIDE 0.5 MG: 1 INJECTION, SOLUTION INTRAMUSCULAR; INTRAVENOUS; SUBCUTANEOUS at 18:18

## 2020-07-27 RX ADMIN — FENTANYL CITRATE 75 MCG: 50 INJECTION, SOLUTION INTRAMUSCULAR; INTRAVENOUS at 07:53

## 2020-07-27 RX ADMIN — ROCURONIUM BROMIDE 40 MG: 10 INJECTION INTRAVENOUS at 13:54

## 2020-07-27 RX ADMIN — PHENYLEPHRINE HYDROCHLORIDE 100 MCG: 10 INJECTION INTRAVENOUS at 11:17

## 2020-07-27 RX ADMIN — PHENYLEPHRINE HYDROCHLORIDE 100 MCG: 10 INJECTION INTRAVENOUS at 11:03

## 2020-07-27 RX ADMIN — MIDAZOLAM 2 MG: 1 INJECTION INTRAMUSCULAR; INTRAVENOUS at 06:59

## 2020-07-27 RX ADMIN — ALBUMIN HUMAN: 0.05 INJECTION, SOLUTION INTRAVENOUS at 12:06

## 2020-07-27 RX ADMIN — PHENYLEPHRINE HYDROCHLORIDE 50 MCG: 10 INJECTION INTRAVENOUS at 09:43

## 2020-07-27 RX ADMIN — ROCURONIUM BROMIDE 50 MG: 10 INJECTION INTRAVENOUS at 07:53

## 2020-07-27 RX ADMIN — HYDROMORPHONE HYDROCHLORIDE 0.5 MG: 1 INJECTION, SOLUTION INTRAMUSCULAR; INTRAVENOUS; SUBCUTANEOUS at 18:58

## 2020-07-27 RX ADMIN — PHENYLEPHRINE HYDROCHLORIDE 100 MCG: 10 INJECTION INTRAVENOUS at 11:57

## 2020-07-27 RX ADMIN — PHENYLEPHRINE HYDROCHLORIDE 100 MCG: 10 INJECTION INTRAVENOUS at 10:41

## 2020-07-27 RX ADMIN — FENTANYL CITRATE 50 MCG: 50 INJECTION, SOLUTION INTRAMUSCULAR; INTRAVENOUS at 09:07

## 2020-07-27 RX ADMIN — PHENYLEPHRINE HYDROCHLORIDE 100 MCG: 10 INJECTION INTRAVENOUS at 12:07

## 2020-07-27 RX ADMIN — PHENYLEPHRINE HYDROCHLORIDE 50 MCG: 10 INJECTION INTRAVENOUS at 13:56

## 2020-07-27 RX ADMIN — SODIUM CHLORIDE, SODIUM GLUCONATE, SODIUM ACETATE, POTASSIUM CHLORIDE AND MAGNESIUM CHLORIDE: 526; 502; 368; 37; 30 INJECTION, SOLUTION INTRAVENOUS at 07:43

## 2020-07-27 RX ADMIN — LIDOCAINE HYDROCHLORIDE 80 MG: 20 INJECTION, SOLUTION INFILTRATION; PERINEURAL at 07:53

## 2020-07-27 RX ADMIN — SODIUM CHLORIDE, SODIUM GLUCONATE, SODIUM ACETATE, POTASSIUM CHLORIDE AND MAGNESIUM CHLORIDE: 526; 502; 368; 37; 30 INJECTION, SOLUTION INTRAVENOUS at 13:44

## 2020-07-27 RX ADMIN — PHENYLEPHRINE HYDROCHLORIDE 0.5 MCG/KG/MIN: 10 INJECTION INTRAVENOUS at 11:11

## 2020-07-27 RX ADMIN — PHENYLEPHRINE HYDROCHLORIDE 100 MCG: 10 INJECTION INTRAVENOUS at 11:19

## 2020-07-27 RX ADMIN — ROCURONIUM BROMIDE 30 MG: 10 INJECTION INTRAVENOUS at 11:32

## 2020-07-27 RX ADMIN — PHENYLEPHRINE HYDROCHLORIDE 100 MCG: 10 INJECTION INTRAVENOUS at 14:30

## 2020-07-27 RX ADMIN — PROPOFOL 30 MG: 10 INJECTION, EMULSION INTRAVENOUS at 16:50

## 2020-07-27 RX ADMIN — ALBUMIN HUMAN: 0.05 INJECTION, SOLUTION INTRAVENOUS at 12:41

## 2020-07-27 RX ADMIN — MIDAZOLAM 2 MG: 1 INJECTION INTRAMUSCULAR; INTRAVENOUS at 07:39

## 2020-07-27 RX ADMIN — PHENYLEPHRINE HYDROCHLORIDE 100 MCG: 10 INJECTION INTRAVENOUS at 12:15

## 2020-07-27 RX ADMIN — CALCIUM CHLORIDE 500 MG: 100 INJECTION, SOLUTION INTRAVENOUS at 13:12

## 2020-07-27 RX ADMIN — ALBUMIN HUMAN: 0.05 INJECTION, SOLUTION INTRAVENOUS at 17:03

## 2020-07-27 RX ADMIN — SODIUM CHLORIDE, POTASSIUM CHLORIDE, SODIUM LACTATE AND CALCIUM CHLORIDE: 600; 310; 30; 20 INJECTION, SOLUTION INTRAVENOUS at 07:39

## 2020-07-27 RX ADMIN — ONDANSETRON 4 MG: 2 INJECTION INTRAMUSCULAR; INTRAVENOUS at 08:47

## 2020-07-27 RX ADMIN — FENTANYL CITRATE 25 MCG: 50 INJECTION, SOLUTION INTRAMUSCULAR; INTRAVENOUS at 06:59

## 2020-07-27 RX ADMIN — Medication: at 19:18

## 2020-07-27 RX ADMIN — PHENYLEPHRINE HYDROCHLORIDE 100 MCG: 10 INJECTION INTRAVENOUS at 14:44

## 2020-07-27 RX ADMIN — FENTANYL CITRATE 25 MCG: 50 INJECTION, SOLUTION INTRAMUSCULAR; INTRAVENOUS at 07:39

## 2020-07-27 RX ADMIN — PHENYLEPHRINE HYDROCHLORIDE 100 MCG: 10 INJECTION INTRAVENOUS at 14:56

## 2020-07-27 RX ADMIN — ONDANSETRON 4 MG: 2 INJECTION INTRAMUSCULAR; INTRAVENOUS at 17:41

## 2020-07-27 RX ADMIN — FENTANYL CITRATE 50 MCG: 50 INJECTION, SOLUTION INTRAMUSCULAR; INTRAVENOUS at 18:21

## 2020-07-27 RX ADMIN — ERTAPENEM SODIUM 1 G: 1 INJECTION, POWDER, LYOPHILIZED, FOR SOLUTION INTRAMUSCULAR; INTRAVENOUS at 16:10

## 2020-07-27 RX ADMIN — SUGAMMADEX 200 MG: 100 INJECTION, SOLUTION INTRAVENOUS at 18:05

## 2020-07-27 RX ADMIN — PHENYLEPHRINE HYDROCHLORIDE 50 MCG: 10 INJECTION INTRAVENOUS at 14:06

## 2020-07-27 RX ADMIN — SUFENTANIL CITRATE: 50 INJECTION EPIDURAL; INTRAVENOUS at 12:28

## 2020-07-27 RX ADMIN — FENTANYL CITRATE 50 MCG: 50 INJECTION, SOLUTION INTRAMUSCULAR; INTRAVENOUS at 07:10

## 2020-07-27 RX ADMIN — FENTANYL CITRATE 50 MCG: 50 INJECTION, SOLUTION INTRAMUSCULAR; INTRAVENOUS at 07:02

## 2020-07-27 RX ADMIN — PHENYLEPHRINE HYDROCHLORIDE 100 MCG: 10 INJECTION INTRAVENOUS at 10:29

## 2020-07-27 RX ADMIN — PROPOFOL 50 MG: 10 INJECTION, EMULSION INTRAVENOUS at 08:01

## 2020-07-27 RX ADMIN — SODIUM CHLORIDE, SODIUM LACTATE, POTASSIUM CHLORIDE, CALCIUM CHLORIDE AND DEXTROSE MONOHYDRATE: 5; 600; 310; 30; 20 INJECTION, SOLUTION INTRAVENOUS at 19:21

## 2020-07-27 RX ADMIN — SODIUM CHLORIDE, POTASSIUM CHLORIDE, SODIUM LACTATE AND CALCIUM CHLORIDE: 600; 310; 30; 20 INJECTION, SOLUTION INTRAVENOUS at 14:23

## 2020-07-27 RX ADMIN — HYDROMORPHONE HYDROCHLORIDE 0.5 MG: 1 INJECTION, SOLUTION INTRAMUSCULAR; INTRAVENOUS; SUBCUTANEOUS at 18:50

## 2020-07-27 RX ADMIN — PHENYLEPHRINE HYDROCHLORIDE 100 MCG: 10 INJECTION INTRAVENOUS at 13:25

## 2020-07-27 RX ADMIN — FENTANYL CITRATE 50 MCG: 50 INJECTION, SOLUTION INTRAMUSCULAR; INTRAVENOUS at 18:40

## 2020-07-27 RX ADMIN — ROCURONIUM BROMIDE 30 MG: 10 INJECTION INTRAVENOUS at 15:15

## 2020-07-27 RX ADMIN — Medication 10 MG: at 19:45

## 2020-07-27 ASSESSMENT — ACTIVITIES OF DAILY LIVING (ADL)
BATHING: 0-->INDEPENDENT
FALL_HISTORY_WITHIN_LAST_SIX_MONTHS: NO
TRANSFERRING: 0-->INDEPENDENT
DRESS: 0-->INDEPENDENT
COGNITION: 0 - NO COGNITION ISSUES REPORTED
RETIRED_EATING: 0-->INDEPENDENT
SWALLOWING: 0-->SWALLOWS FOODS/LIQUIDS WITHOUT DIFFICULTY
TOILETING: 0-->INDEPENDENT
AMBULATION: 0-->INDEPENDENT
RETIRED_COMMUNICATION: 0-->UNDERSTANDS/COMMUNICATES WITHOUT DIFFICULTY

## 2020-07-27 ASSESSMENT — MIFFLIN-ST. JEOR: SCORE: 1241.88

## 2020-07-27 NOTE — OR NURSING
Pt alert and oriented without complaints this am.  Blood sugar this am 124.  Pt refused pregnancy test and states that there is  no way  She could be pregnant.

## 2020-07-27 NOTE — PROGRESS NOTES
Spiritual Health Services Progress Note  [facility], [address]      Provided chaplaincy care with Dottie and her  (Jian) prior to her surgery. They affirmed the significance of their Mormon (Islam) emily as a source of positive coping. Prayer was shared. I will inform unit  of her desire for follow-up chaplaincy care during her admission. I have no plan for follow-up today.      Zachary Foreman   [phone]

## 2020-07-27 NOTE — ANESTHESIA CARE TRANSFER NOTE
Patient: Dottie Hernandez    Procedure(s):  Laproscopipc PANCREATECTOMY, TOTAL, WITH AUTOLOGOUS PANCREATIC ISLET CELL TRANSPLANT, Splenectomy, gastrojejunostomy placement  Biopsy liver    Diagnosis: Pancreatitis [K85.90]  Diagnosis Additional Information: No value filed.    Anesthesia Type:   General     Note:  Airway :Nasal Cannula  Patient transferred to:PACU  Handoff Report: Identifed the Patient, Identified the Reponsible Provider, Reviewed the pertinent medical history, Discussed the surgical course, Reviewed Intra-OP anesthesia mangement and issues during anesthesia, Set expectations for post-procedure period and Allowed opportunity for questions and acknowledgement of understanding      Vitals: (Last set prior to Anesthesia Care Transfer)    CRNA VITALS  7/27/2020 1757 - 7/27/2020 1831      7/27/2020             Pulse:  82    SpO2:  98 %                Electronically Signed By: JUJU Hernandez CRNA  July 27, 2020  6:31 PM

## 2020-07-27 NOTE — ANESTHESIA PROCEDURE NOTES
Central Line Procedure Note  Staff -   Anesthesiologist:  Wachter, Sarah, MD      Performed By: anesthesiologist        Location: In OR after induction  Procedure Start/Stop Times:     patient identified, IV checked, site marked, risks and benefits discussed, informed consent, monitors and equipment checked, pre-op evaluation and at physician/surgeon's request    Line Placement:     Procedure:  Central Line    Insertion laterality:  Right    Insertion site:  Subclavian    Position:  Trendelenburg    Sterility preparation included the following: hand hygiene performed prior to central venous catheter insertion, maximum sterile barriers were used: cap, mask, sterile gown, sterile gloves, and large sterile sheet, antiseptic used during central venous catheter insertion and skin prep agent completely dried prior to procedure         Injection Technique:  Ultrasound guided    Sterile Ultrasound Technique:  Sterile probe cover and Sterile gel    Vein evaluated via U/S for patency/adequacy of catheter insertion and is adequate.  Using realtime U/S imaging the vein was punctured, and needle was observed entering vein on U/S      A permanent image is NOT entered into the patient's record.      Local skin infiltration:  None    Catheter size:  7 Fr, 3 lumen, 20 cm    Catheter length at skin (cm):  15    Cath secured with: suture      Dressing:  Tegaderm and Biopatch    Complications:  None obvious    Blood aspirated all lumens: Yes      All Lumens Flushed: Yes      Verification method:  Placement to be verified post-op{

## 2020-07-27 NOTE — ANESTHESIA PROCEDURE NOTES
Arterial Line Procedure Note  Staff -       CRNA: Dione Wooten APRN CRNA    Performed By: CRNA  Procedure performed by resident/CRNA in presence of a teaching physician.          Location: In OR After Induction  Procedure Start/Stop Times:     patient identified, IV checked, site marked, risks and benefits discussed, informed consent, monitors and equipment checked, pre-op evaluation and at physician/surgeon's request      Correct Patient: Yes      Correct Position: Yes      Correct Site: Yes      Correct Procedure: Yes      Correct Laterality:  N/A    Site Marked:  N/A  Line Placement:     Procedure:  Arterial Line    Insertion Site:  Radial    Insertion laterality:  Left    Skin Prep: Chloraprep      Patient Prep: patient draped, mask, sterile gloves, hat and hand hygiene      Local skin infiltration:  None    Ultrasound Guided?: No      Catheter size:  20 gauge, Quick cath    Cath secured with: other (comment)      Cath secured with comment:  Tape    Dressing:  Tegaderm    Complications:  None obvious    Arterial waveform: Yes      IBP within 10% of NIBP: Yes

## 2020-07-27 NOTE — OP NOTE
Transplant Surgery  Operative Note    PREOPERATIVE DIAGNOSES: Chronic pancreatitis secondary to Pancreatic Divisum.  S/p open sphincter of oddi exploration  S/p [place] x2  POSTOPERATIVE DIAGNOSES: Same  PROCEDURE:   Additional complexity due to prior three pancreatic surgeries  Laparoscopic hand-assisted total pancreatectomy  Splenectomy  Retrocollic duodenojejunostomy  Antecolic choledochojejunostomy over 8 Fr pediatric FT stent  Gastrojejunostomy placement  Liver biopsy  SURGEON: MISTY Jarvis  ASSISTANT:  Renee Peña, Transplant Fellow. There was no qualified general surgery resident available to assist during this procedure.   ANESTHESIA:  General endotracheal.   SPECIMENS: pancreas to the islet lab, pancreatic biopsies, spleen, duodenum and liver  DRAINS: Madhu drain.  EBL: 500 ml  UO: 825 ml  FLUIDS: 1347 cc crystalloid, 4000 cc colloid and 1 u PRBC  COMPLICATIONS:  None.  FINDINGS: multiple adhesions between prior J-J, Solange, stomach, pancreas, spleen, colon  Autotransplant data:   Patient weight: 63.1 kg  Tissue mass: 3.5 ml  Total Islet number: 70,400.  Total Islet number/k.  Islet equivalents: 53,900  Islet equivalents/kilogram: 2200  Pre-infusion portal pressure: 0 cm/H2O, Mean blood flow was 280 ml/min.  Peak portal pressure: 1 cm/H2O, Mean blood flow was 246 ml/min.  Post-rinse (final) portal pressure: 0 cm/H2O, Mean blood flow was 118 ml/min.    INDICATIONS OF THE PROCEDURE: chronic abdominal pain with narcotic dependence .  DESCRIPTION OF THE PROCEDURE: After obtaining informed consent, the patient was brought to the operative room and placed in a supine position. General endotracheal intubation and anesthesia was induced. After this, an arterial line and a central line were placed under sterile condition by the anesthesia team. A briefing was performed. SCD's and Armendariz catheter were placed. The abdomen was prepped and draped in the usual fashion.  The patient received preoperative IV   antibiotics. A pause for the cause was performed.    We excised prior midline scar.  We entered the abdomen above th bellybutton and took down multiple adhesions between the stomach, J-J and anterior abdominal wall.  We further identified pancreaticojejunostomy and partially took down short gastrics.  We placed 6 cm hand port and insufflated the abdomen to the maximum pressure of 15mm H2O.  We introduced additional 5 mm port in LUQ and 5 mm port in supraumbilical region.  We proceeded by taking down the adhesions between omentum, liver and spleen.  The pancreas was very firm and dysplastic inflammatory changes.  We further dissected the superior and inferior body of the pancreas and took down remainder of the short gastrics.  Next we released the adhesions between spleen and diaphragm, lateral abdominal wall, retroperitoneum and omentum at the inferior border.  We rotated the spleen medially and further completed retroperitoneal dissection of the pancreatic body.      Once spleen and pancreas were medialized, we converted to procedure to open.  We stapled across the pancreaticojejunostomy and took down the rest of the limb, trimming the edge for future anastomosis and oversewing staple line with 4-0 Prolene.  Next we dissected out the pylorus and first portion of the duodenum and divided it with blue load CONNER stapler.  Next we took down additional adhesions between the liver, abdominal wall and the bowel.  We Kocherized duodenum.  Colonic mesentery was scared in at the site of the portal vein due to prior mobilization and surgery.  We carefully dissected it out, maintaining hemostasis and vascularization.  Osman vein was cleared off.  We next created a tunnel behind neck of the pancreas, encircled the pancreas with blue loop and proceeded with dissection an clearing the superior head of the pancreas.  GDA was ligated, bile duct was identified and ligated.      The duodenum was divided between D2 and D3 and the  harmonic scalpel used to free the duodenum from the uncinate process. The We tied off the tissue parallel to SMV and SMA.       The pancreas was prepared by resecting all the non-pancreatic tissue sharply.  Biopsies of the pancreas were taken.  50cc of preservation solution containing 0.5 mg/ml alpha-1-antitrypsin was instilled into the duct to distend the gland.  The pancreas was packed in iced cold preservation solution and transferred to the awaiting islet team.   We then performed a thorough irrigation and complete hemostasis and began the reconstruction.   We first examined the previous J-J.  We took down adhesions in the area and traced pancreatic limb toward J-J.    Next, we completed mobilization of jejunum at the ligament of treitz.  Freshened the edges and oversewed with 4-0 Prolene.   We closed LOT defect.  Next we placed gastrojejunostomy through left upper quadrant prior 5 mm port incision and advanced the tube into the stomach and secured it with two 3-0 PDS pursestring and inflated balloon with 7 ml of water.  Next we performed retrocollic duodenojejunostomy hand-sewn in two-layers with 4-0 PDS and 4-0 Prolene.    We advanced gastrojejunostomy into the jejunum and secured it with 4-0 chromic.      Next we irrigated the abdomen with 5 L of normal saline, achieved full hemostasis and turned our attention to antecolic choledochojejunostomy.   The choledochojejunostomy was performed with 6-0 PDS running suture over 8 Fr pediatric FT.    We left J-J from prior operation without revision.      Upon arrival of the islets, the patient was heparinized.  We placed 8 Fr pediatric feeding tube into the stump of the splenic vein. Through this, we infused the islet cells into the liver.  All the islets were infused.  See findings for blood flow and portal pressure measurements.  The catheter was removed and the vein puncture site was secured.    A wedge liver biopsy was done.  The retrocollic defect at  duodenojejunostomy was closed.  There was no defect at old J-J and the antecolic choledocho-J given prior adhesions.    18-Bengali daya drain was placed behind biliary anastomosis.  Stomach with G-J was paxied to anterior abd wall with 4-0 PDS x4.    Irrigation and hemostasis of the abdomen was completed. The fascia was closed with #1 looped PDS.  Skin was approximated with 3-0 Vicryl and staples.  Sterile dressing were applied.  At the end of the case, all the sponge and needle counts were correct.The patient tolerated the procedure well and  was transferred in stable and satisfactory condition to the PACU.

## 2020-07-28 ENCOUNTER — APPOINTMENT (OUTPATIENT)
Dept: PHYSICAL THERAPY | Facility: CLINIC | Age: 55
End: 2020-07-28
Attending: SURGERY
Payer: COMMERCIAL

## 2020-07-28 PROBLEM — Z90.410 ACQUIRED TOTAL ABSENCE OF PANCREAS: Status: ACTIVE | Noted: 2020-07-28

## 2020-07-28 LAB
ALBUMIN SERPL-MCNC: 2.7 G/DL (ref 3.4–5)
ALP SERPL-CCNC: 39 U/L (ref 40–150)
ALT SERPL W P-5'-P-CCNC: 52 U/L (ref 0–50)
AMYLASE SERPL-CCNC: 76 U/L (ref 30–110)
ANION GAP SERPL CALCULATED.3IONS-SCNC: 4 MMOL/L (ref 3–14)
APTT PPP: 34 SEC (ref 22–37)
AST SERPL W P-5'-P-CCNC: 59 U/L (ref 0–45)
BASOPHILS # BLD AUTO: 0 10E9/L (ref 0–0.2)
BASOPHILS NFR BLD AUTO: 0.1 %
BILIRUB DIRECT SERPL-MCNC: <0.1 MG/DL (ref 0–0.2)
BILIRUB SERPL-MCNC: 0.3 MG/DL (ref 0.2–1.3)
BUN SERPL-MCNC: 7 MG/DL (ref 7–30)
CALCIUM SERPL-MCNC: 7.7 MG/DL (ref 8.5–10.1)
CHLORIDE SERPL-SCNC: 112 MMOL/L (ref 94–109)
CO2 SERPL-SCNC: 27 MMOL/L (ref 20–32)
CREAT SERPL-MCNC: 0.6 MG/DL (ref 0.52–1.04)
CRP SERPL-MCNC: 120 MG/L (ref 0–8)
DIFFERENTIAL METHOD BLD: ABNORMAL
EOSINOPHIL # BLD AUTO: 0 10E9/L (ref 0–0.7)
EOSINOPHIL NFR BLD AUTO: 0 %
ERYTHROCYTE [DISTWIDTH] IN BLOOD BY AUTOMATED COUNT: 13.2 % (ref 10–15)
ERYTHROCYTE [DISTWIDTH] IN BLOOD BY AUTOMATED COUNT: 13.3 % (ref 10–15)
GFR SERPL CREATININE-BSD FRML MDRD: >90 ML/MIN/{1.73_M2}
GLUCOSE BLDC GLUCOMTR-MCNC: 100 MG/DL (ref 70–99)
GLUCOSE BLDC GLUCOMTR-MCNC: 100 MG/DL (ref 70–99)
GLUCOSE BLDC GLUCOMTR-MCNC: 101 MG/DL (ref 70–99)
GLUCOSE BLDC GLUCOMTR-MCNC: 102 MG/DL (ref 70–99)
GLUCOSE BLDC GLUCOMTR-MCNC: 103 MG/DL (ref 70–99)
GLUCOSE BLDC GLUCOMTR-MCNC: 107 MG/DL (ref 70–99)
GLUCOSE BLDC GLUCOMTR-MCNC: 108 MG/DL (ref 70–99)
GLUCOSE BLDC GLUCOMTR-MCNC: 109 MG/DL (ref 70–99)
GLUCOSE BLDC GLUCOMTR-MCNC: 110 MG/DL (ref 70–99)
GLUCOSE BLDC GLUCOMTR-MCNC: 111 MG/DL (ref 70–99)
GLUCOSE BLDC GLUCOMTR-MCNC: 114 MG/DL (ref 70–99)
GLUCOSE BLDC GLUCOMTR-MCNC: 117 MG/DL (ref 70–99)
GLUCOSE BLDC GLUCOMTR-MCNC: 121 MG/DL (ref 70–99)
GLUCOSE BLDC GLUCOMTR-MCNC: 125 MG/DL (ref 70–99)
GLUCOSE BLDC GLUCOMTR-MCNC: 129 MG/DL (ref 70–99)
GLUCOSE BLDC GLUCOMTR-MCNC: 131 MG/DL (ref 70–99)
GLUCOSE BLDC GLUCOMTR-MCNC: 133 MG/DL (ref 70–99)
GLUCOSE BLDC GLUCOMTR-MCNC: 137 MG/DL (ref 70–99)
GLUCOSE BLDC GLUCOMTR-MCNC: 150 MG/DL (ref 70–99)
GLUCOSE BLDC GLUCOMTR-MCNC: 156 MG/DL (ref 70–99)
GLUCOSE BLDC GLUCOMTR-MCNC: 80 MG/DL (ref 70–99)
GLUCOSE BLDC GLUCOMTR-MCNC: 83 MG/DL (ref 70–99)
GLUCOSE BLDC GLUCOMTR-MCNC: 98 MG/DL (ref 70–99)
GLUCOSE BLDC GLUCOMTR-MCNC: 99 MG/DL (ref 70–99)
GLUCOSE SERPL-MCNC: 107 MG/DL (ref 70–99)
HCT VFR BLD AUTO: 24.9 % (ref 35–47)
HCT VFR BLD AUTO: 25.4 % (ref 35–47)
HGB BLD-MCNC: 7.1 G/DL (ref 11.7–15.7)
HGB BLD-MCNC: 7.2 G/DL (ref 11.7–15.7)
HGB BLD-MCNC: 8 G/DL (ref 11.7–15.7)
HGB BLD-MCNC: 8.1 G/DL (ref 11.7–15.7)
IMM GRANULOCYTES # BLD: 0.1 10E9/L (ref 0–0.4)
IMM GRANULOCYTES NFR BLD: 0.3 %
LIPASE SERPL-CCNC: 981 U/L (ref 73–393)
LYMPHOCYTES # BLD AUTO: 1 10E9/L (ref 0.8–5.3)
LYMPHOCYTES NFR BLD AUTO: 6.9 %
MAGNESIUM SERPL-MCNC: 1.7 MG/DL (ref 1.6–2.3)
MCH RBC QN AUTO: 28 PG (ref 26.5–33)
MCH RBC QN AUTO: 28.6 PG (ref 26.5–33)
MCHC RBC AUTO-ENTMCNC: 31.5 G/DL (ref 31.5–36.5)
MCHC RBC AUTO-ENTMCNC: 32.5 G/DL (ref 31.5–36.5)
MCV RBC AUTO: 88 FL (ref 78–100)
MCV RBC AUTO: 89 FL (ref 78–100)
MONOCYTES # BLD AUTO: 1.1 10E9/L (ref 0–1.3)
MONOCYTES NFR BLD AUTO: 7.3 %
NEUTROPHILS # BLD AUTO: 12.6 10E9/L (ref 1.6–8.3)
NEUTROPHILS NFR BLD AUTO: 85.4 %
NRBC # BLD AUTO: 0 10*3/UL
NRBC BLD AUTO-RTO: 0 /100
PHOSPHATE SERPL-MCNC: 2.1 MG/DL (ref 2.5–4.5)
PLATELET # BLD AUTO: 147 10E9/L (ref 150–450)
PLATELET # BLD AUTO: 177 10E9/L (ref 150–450)
POTASSIUM SERPL-SCNC: 3.1 MMOL/L (ref 3.4–5.3)
POTASSIUM SERPL-SCNC: 3.3 MMOL/L (ref 3.4–5.3)
POTASSIUM SERPL-SCNC: 3.9 MMOL/L (ref 3.4–5.3)
PREALB SERPL IA-MCNC: 12 MG/DL (ref 15–45)
PROT SERPL-MCNC: 4.3 G/DL (ref 6.8–8.8)
RBC # BLD AUTO: 2.83 10E12/L (ref 3.8–5.2)
RBC # BLD AUTO: 2.86 10E12/L (ref 3.8–5.2)
SODIUM SERPL-SCNC: 143 MMOL/L (ref 133–144)
WBC # BLD AUTO: 13.5 10E9/L (ref 4–11)
WBC # BLD AUTO: 14.8 10E9/L (ref 4–11)

## 2020-07-28 PROCEDURE — 25000125 ZZHC RX 250: Performed by: PHYSICIAN ASSISTANT

## 2020-07-28 PROCEDURE — 25000132 ZZH RX MED GY IP 250 OP 250 PS 637: Performed by: PHYSICIAN ASSISTANT

## 2020-07-28 PROCEDURE — 80048 BASIC METABOLIC PNL TOTAL CA: CPT | Performed by: TRANSPLANT SURGERY

## 2020-07-28 PROCEDURE — 25000128 H RX IP 250 OP 636

## 2020-07-28 PROCEDURE — 83735 ASSAY OF MAGNESIUM: CPT | Performed by: TRANSPLANT SURGERY

## 2020-07-28 PROCEDURE — 25000128 H RX IP 250 OP 636: Performed by: TRANSPLANT SURGERY

## 2020-07-28 PROCEDURE — 25000128 H RX IP 250 OP 636: Performed by: SURGERY

## 2020-07-28 PROCEDURE — 25000128 H RX IP 250 OP 636: Performed by: PHYSICIAN ASSISTANT

## 2020-07-28 PROCEDURE — 25800030 ZZH RX IP 258 OP 636: Performed by: PHYSICIAN ASSISTANT

## 2020-07-28 PROCEDURE — 20000004 ZZH R&B ICU UMMC

## 2020-07-28 PROCEDURE — 85025 COMPLETE CBC W/AUTO DIFF WBC: CPT | Performed by: TRANSPLANT SURGERY

## 2020-07-28 PROCEDURE — 85018 HEMOGLOBIN: CPT | Performed by: STUDENT IN AN ORGANIZED HEALTH CARE EDUCATION/TRAINING PROGRAM

## 2020-07-28 PROCEDURE — 97530 THERAPEUTIC ACTIVITIES: CPT | Mod: GP

## 2020-07-28 PROCEDURE — 99231 SBSQ HOSP IP/OBS SF/LOW 25: CPT | Mod: GC | Performed by: SURGERY

## 2020-07-28 PROCEDURE — 27210436 ZZH NUTRITION PRODUCT SEMIELEM INTERMED CAN

## 2020-07-28 PROCEDURE — 84134 ASSAY OF PREALBUMIN: CPT | Performed by: TRANSPLANT SURGERY

## 2020-07-28 PROCEDURE — 84100 ASSAY OF PHOSPHORUS: CPT | Performed by: TRANSPLANT SURGERY

## 2020-07-28 PROCEDURE — 83690 ASSAY OF LIPASE: CPT | Performed by: TRANSPLANT SURGERY

## 2020-07-28 PROCEDURE — 25000132 ZZH RX MED GY IP 250 OP 250 PS 637

## 2020-07-28 PROCEDURE — 85018 HEMOGLOBIN: CPT | Performed by: TRANSPLANT SURGERY

## 2020-07-28 PROCEDURE — 00000146 ZZHCL STATISTIC GLUCOSE BY METER IP

## 2020-07-28 PROCEDURE — 85730 THROMBOPLASTIN TIME PARTIAL: CPT | Performed by: TRANSPLANT SURGERY

## 2020-07-28 PROCEDURE — 25000132 ZZH RX MED GY IP 250 OP 250 PS 637: Performed by: SURGERY

## 2020-07-28 PROCEDURE — 25000125 ZZHC RX 250: Performed by: TRANSPLANT SURGERY

## 2020-07-28 PROCEDURE — 25000125 ZZHC RX 250

## 2020-07-28 PROCEDURE — 80076 HEPATIC FUNCTION PANEL: CPT | Performed by: TRANSPLANT SURGERY

## 2020-07-28 PROCEDURE — 97162 PT EVAL MOD COMPLEX 30 MIN: CPT | Mod: GP

## 2020-07-28 PROCEDURE — 82150 ASSAY OF AMYLASE: CPT | Performed by: TRANSPLANT SURGERY

## 2020-07-28 PROCEDURE — 25800030 ZZH RX IP 258 OP 636

## 2020-07-28 PROCEDURE — 86140 C-REACTIVE PROTEIN: CPT | Performed by: TRANSPLANT SURGERY

## 2020-07-28 PROCEDURE — 25000125 ZZHC RX 250: Performed by: SURGERY

## 2020-07-28 PROCEDURE — 85027 COMPLETE CBC AUTOMATED: CPT | Performed by: TRANSPLANT SURGERY

## 2020-07-28 PROCEDURE — 84132 ASSAY OF SERUM POTASSIUM: CPT

## 2020-07-28 RX ORDER — PIPERACILLIN SODIUM, TAZOBACTAM SODIUM 3; .375 G/15ML; G/15ML
3.38 INJECTION, POWDER, LYOPHILIZED, FOR SOLUTION INTRAVENOUS EVERY 8 HOURS
Status: DISCONTINUED | OUTPATIENT
Start: 2020-07-28 | End: 2020-07-29

## 2020-07-28 RX ORDER — POTASSIUM CHLORIDE 750 MG/1
20-40 TABLET, EXTENDED RELEASE ORAL
Status: DISCONTINUED | OUTPATIENT
Start: 2020-07-28 | End: 2020-08-06 | Stop reason: HOSPADM

## 2020-07-28 RX ORDER — POTASSIUM CHLORIDE 7.45 MG/ML
10 INJECTION INTRAVENOUS
Status: DISCONTINUED | OUTPATIENT
Start: 2020-07-28 | End: 2020-08-06 | Stop reason: HOSPADM

## 2020-07-28 RX ORDER — DEXTROSE MONOHYDRATE 100 MG/ML
INJECTION, SOLUTION INTRAVENOUS CONTINUOUS PRN
Status: DISCONTINUED | OUTPATIENT
Start: 2020-07-28 | End: 2020-08-06 | Stop reason: HOSPADM

## 2020-07-28 RX ORDER — POTASSIUM CHLORIDE 29.8 MG/ML
20 INJECTION INTRAVENOUS
Status: DISCONTINUED | OUTPATIENT
Start: 2020-07-28 | End: 2020-08-06 | Stop reason: HOSPADM

## 2020-07-28 RX ORDER — MAGNESIUM SULFATE HEPTAHYDRATE 40 MG/ML
4 INJECTION, SOLUTION INTRAVENOUS EVERY 4 HOURS PRN
Status: DISCONTINUED | OUTPATIENT
Start: 2020-07-28 | End: 2020-08-06 | Stop reason: HOSPADM

## 2020-07-28 RX ORDER — POTASSIUM CHLORIDE 29.8 MG/ML
20 INJECTION INTRAVENOUS ONCE
Status: DISCONTINUED | OUTPATIENT
Start: 2020-07-28 | End: 2020-07-29

## 2020-07-28 RX ORDER — POTASSIUM CL/LIDO/0.9 % NACL 10MEQ/0.1L
10 INTRAVENOUS SOLUTION, PIGGYBACK (ML) INTRAVENOUS
Status: DISCONTINUED | OUTPATIENT
Start: 2020-07-28 | End: 2020-08-06 | Stop reason: HOSPADM

## 2020-07-28 RX ORDER — HEPARIN SODIUM 10000 [USP'U]/100ML
400 INJECTION, SOLUTION INTRAVENOUS CONTINUOUS
Status: DISCONTINUED | OUTPATIENT
Start: 2020-07-28 | End: 2020-08-03

## 2020-07-28 RX ORDER — ALBUMIN, HUMAN INJ 5% 5 %
12.5 SOLUTION INTRAVENOUS ONCE
Status: DISCONTINUED | OUTPATIENT
Start: 2020-07-28 | End: 2020-07-28

## 2020-07-28 RX ORDER — MAGNESIUM SULFATE HEPTAHYDRATE 40 MG/ML
2 INJECTION, SOLUTION INTRAVENOUS DAILY PRN
Status: DISCONTINUED | OUTPATIENT
Start: 2020-07-28 | End: 2020-08-06 | Stop reason: HOSPADM

## 2020-07-28 RX ORDER — POTASSIUM CHLORIDE 1.5 G/1.58G
20-40 POWDER, FOR SOLUTION ORAL
Status: DISCONTINUED | OUTPATIENT
Start: 2020-07-28 | End: 2020-08-06 | Stop reason: HOSPADM

## 2020-07-28 RX ADMIN — PIPERACILLIN AND TAZOBACTAM 3.38 G: 3; .375 INJECTION, POWDER, FOR SOLUTION INTRAVENOUS at 09:09

## 2020-07-28 RX ADMIN — SODIUM CHLORIDE, SODIUM LACTATE, POTASSIUM CHLORIDE, CALCIUM CHLORIDE AND DEXTROSE MONOHYDRATE: 5; 600; 310; 30; 20 INJECTION, SOLUTION INTRAVENOUS at 20:59

## 2020-07-28 RX ADMIN — POTASSIUM CHLORIDE 20 MEQ: 29.8 INJECTION, SOLUTION INTRAVENOUS at 17:13

## 2020-07-28 RX ADMIN — HUMAN INSULIN 3 UNITS/HR: 100 INJECTION, SOLUTION SUBCUTANEOUS at 02:24

## 2020-07-28 RX ADMIN — LORAZEPAM 0.5 MG: 2 INJECTION INTRAMUSCULAR; INTRAVENOUS at 22:42

## 2020-07-28 RX ADMIN — POTASSIUM CHLORIDE 20 MEQ: 29.8 INJECTION, SOLUTION INTRAVENOUS at 07:32

## 2020-07-28 RX ADMIN — GABAPENTIN 300 MG: 250 SUSPENSION ORAL at 08:16

## 2020-07-28 RX ADMIN — LORAZEPAM 0.5 MG: 2 INJECTION INTRAMUSCULAR; INTRAVENOUS at 03:46

## 2020-07-28 RX ADMIN — LORAZEPAM 0.5 MG: 2 INJECTION INTRAMUSCULAR; INTRAVENOUS at 16:05

## 2020-07-28 RX ADMIN — Medication 2.5 ML: at 08:16

## 2020-07-28 RX ADMIN — POTASSIUM CHLORIDE 20 MEQ: 29.8 INJECTION, SOLUTION INTRAVENOUS at 18:28

## 2020-07-28 RX ADMIN — POTASSIUM CHLORIDE 20 MEQ: 29.8 INJECTION, SOLUTION INTRAVENOUS at 23:46

## 2020-07-28 RX ADMIN — KETAMINE HYDROCHLORIDE 5 MG: 10 INJECTION INTRAMUSCULAR; INTRAVENOUS at 12:25

## 2020-07-28 RX ADMIN — Medication 40 MG: at 08:16

## 2020-07-28 RX ADMIN — MAGNESIUM SULFATE IN WATER 2 G: 40 INJECTION, SOLUTION INTRAVENOUS at 09:44

## 2020-07-28 RX ADMIN — POTASSIUM CHLORIDE 20 MEQ: 29.8 INJECTION, SOLUTION INTRAVENOUS at 10:50

## 2020-07-28 RX ADMIN — SODIUM CHLORIDE, SODIUM LACTATE, POTASSIUM CHLORIDE, CALCIUM CHLORIDE AND DEXTROSE MONOHYDRATE: 5; 600; 310; 30; 20 INJECTION, SOLUTION INTRAVENOUS at 12:34

## 2020-07-28 RX ADMIN — SODIUM PHOSPHATE, MONOBASIC, MONOHYDRATE AND SODIUM PHOSPHATE, DIBASIC, ANHYDROUS 15 MMOL: 276; 142 INJECTION, SOLUTION INTRAVENOUS at 12:08

## 2020-07-28 RX ADMIN — FLUCONAZOLE IN SODIUM CHLORIDE 400 MG: 2 INJECTION, SOLUTION INTRAVENOUS at 05:15

## 2020-07-28 RX ADMIN — KETAMINE HYDROCHLORIDE 5 MG: 10 INJECTION INTRAMUSCULAR; INTRAVENOUS at 19:42

## 2020-07-28 RX ADMIN — KETAMINE HYDROCHLORIDE 5 MG: 10 INJECTION INTRAMUSCULAR; INTRAVENOUS at 06:52

## 2020-07-28 RX ADMIN — ONDANSETRON 4 MG: 2 INJECTION INTRAMUSCULAR; INTRAVENOUS at 18:36

## 2020-07-28 RX ADMIN — SODIUM CHLORIDE, SODIUM LACTATE, POTASSIUM CHLORIDE, CALCIUM CHLORIDE AND DEXTROSE MONOHYDRATE: 5; 600; 310; 30; 20 INJECTION, SOLUTION INTRAVENOUS at 04:02

## 2020-07-28 RX ADMIN — MULTIVITAMIN 15 ML: LIQUID ORAL at 12:25

## 2020-07-28 RX ADMIN — LORAZEPAM 0.5 MG: 2 INJECTION INTRAMUSCULAR; INTRAVENOUS at 09:59

## 2020-07-28 RX ADMIN — HEPARIN SODIUM 200 UNITS/HR: 10000 INJECTION, SOLUTION INTRAVENOUS at 00:22

## 2020-07-28 RX ADMIN — GABAPENTIN 300 MG: 250 SUSPENSION ORAL at 19:57

## 2020-07-28 RX ADMIN — PIPERACILLIN AND TAZOBACTAM 3.38 G: 3; .375 INJECTION, POWDER, FOR SOLUTION INTRAVENOUS at 16:05

## 2020-07-28 ASSESSMENT — PAIN DESCRIPTION - DESCRIPTORS
DESCRIPTORS: PRESSURE
DESCRIPTORS: CONSTANT;SHARP;SHOOTING
DESCRIPTORS: CONSTANT;SHARP;SHOOTING

## 2020-07-28 ASSESSMENT — ACTIVITIES OF DAILY LIVING (ADL)
ADLS_ACUITY_SCORE: 15
ADLS_ACUITY_SCORE: 13
ADLS_ACUITY_SCORE: 15
ADLS_ACUITY_SCORE: 15
ADLS_ACUITY_SCORE: 11
ADLS_ACUITY_SCORE: 13

## 2020-07-28 ASSESSMENT — MIFFLIN-ST. JEOR: SCORE: 1277.88

## 2020-07-28 NOTE — OR NURSING
Dr. Pagan at bedside in pacu. Patient ready for transfer to . MDA will place sign out. Pt ok to transfer

## 2020-07-28 NOTE — PROGRESS NOTES
SURGICAL ICU PROGRESS NOTE  07/28/2020      PRIMARY TEAM: Transplant  PRIMARY PHYSICIAN: Matheus    REASON FOR CRITICAL CARE ADMISSION: LAP TP-IAT  ADMITTING PHYSICIAN: Perlman      ASSESSMENT: Dottie Hernandez is a 54 year old female with a past medical history of season allergies, migraines, ANIBAL, and chronic pancreatitis 2/2 pancreatic divisim who undewent laparoscopic TPIAT with Dr. Jarvis 7/27/2020    PLAN:  Neuro/ pain/ sedation:  # Agitation   - Monitor neurological status. Delirium preventions and precautions.   - Pain: dilaudid PCA with basal rate, jimbo ketamine, jimbo ativan, paravertebral catheters                       - Sedation plan: precedex gtt    Pulmonary care:   # Post operative ventilatory support  # Acute hypoxic respiratory support   - Supplemental oxygen to keep saturation above 92 %  - oxymask 3L - wean as tolerated  - Incentive spirometer every 15- 30 minutes when awake.    Cardiovascular:    - Monitor hemodynamic status.       Gastroenterology/Nutrition:  - NPO, ok for ice and meds   - G to gravity, J capped   - Protonix 40mg daily  - Zofran PRN   - tube feeds       Renal/fluids/lytes:  - d5 lr @ 125  - Urine output is adequate at 1.2 mL/kr/hr  . Will continue to monitor intake and output.  - Armendariz in place     Endocrine:  #s/p TPIAT  - Insulin gtt/ D10 gtt  - Target range for BG       ID:  # Leukocytosis   - panc islet culture - gram negative rods  - zosyn x 5 days  - diflucan fungal ppx     Heme:     # Acute blood loss anemia   - EBL 500mL, 1u pRBC, 1,000m: 5% albuimin, 3L Plasma- Lyte intra-op   - Hemoglobin 8.8 pos-op  - Transfuse if hgb <7.0 or signs/symptoms of hypoperfusion. Monitor and trend.      Musculoskeletal:  # Weakness and deconditioning of critical illness   - Physical and occupational therapy consult      Skin:  Flexitrac to tubes      General Cares/Prophylaxis:    DVT Prophylaxis: Pneumatic Compression Devices and hep gtt  GI Prophylaxis: PPI  Restraints:  Restraints for medical healing needed: NO     Lines/ tubes/ drains:  - PIV, CVC, zaragoza, radial A line, GJ, WILMA      Disposition:  - Surgical ICU.      Patient seen and discussed with staff.    Raheel Rosario  PGY-2 Anesthesiology      ====================================    TODAY'S SUBJECTIVE/INTERVAL HISTORY:   No acute events overnight    OBJECTIVE:     Temp:  [96.8  F (36  C)-97.9  F (36.6  C)] 97.9  F (36.6  C)  Pulse:  [] 89  Heart Rate:  [] 92  Resp:  [6-28] 12  BP: ()/() 125/80  MAP:  [60 mmHg-103 mmHg] 65 mmHg  Arterial Line BP: ()/(46-91) 101/50  SpO2:  [90 %-100 %] 94 %  Resp: 12      I/O last 3 completed shifts:  In: 6766.1 [I.V.:2466.1]  Out: 1818 [Urine:1210; Emesis/NG output:25; Drains:83; Blood:500]    General/Neuro: Sleeping in bed comfortably, responsive to stimuli  Pulm: On NC 3L, NLB  Abd: soft; NT, ND  Incision: C/D/I  Extremities: no edema  Skin: warm and well-perfused.     LABS:   Arterial Blood Gases   Recent Labs   Lab 07/27/20  1708 07/27/20  1503 07/27/20  1400 07/27/20  1302   PH 7.39 7.37 7.40 7.42   PCO2 37 40 39 38   PO2 171* 143* 164* 221*   HCO3 23 23 24 25     Complete Blood Count   Recent Labs   Lab 07/28/20  0354 07/28/20  0015 07/27/20  1915 07/27/20  1708  07/23/20  0835   WBC 14.8* 13.5* 10.9  --   --  5.2   HGB 8.1* 8.0* 8.8* 8.9*   < > 14.5    147* 149*  --   --  292    < > = values in this interval not displayed.     Basic Metabolic Panel  Recent Labs   Lab 07/28/20  0354 07/27/20 1915 07/27/20  1708 07/27/20  1503  07/23/20  0835    142 141 140   < > 140   POTASSIUM 3.3* 4.2 3.7 4.2   < > 3.6   CHLORIDE 112* 112*  --   --   --  107   CO2 27 26  --   --   --  28   BUN 7 7  --   --   --  10   CR 0.60 0.56  --   --   --  0.62   * 100* 95 134*   < > 106*    < > = values in this interval not displayed.     Liver Function Tests  Recent Labs   Lab 07/28/20 0354 07/27/20 1915 07/23/20  0835   AST 59* 89* 17   ALT 52* 57* 36    ALKPHOS 39* 39* 108   BILITOTAL 0.3 0.6 0.4   ALBUMIN 2.7* 3.0* 4.5   INR  --  1.68* 1.06     Pancreatic Enzymes  Recent Labs   Lab 07/28/20  0354   LIPASE 981*   AMYLASE 76     Coagulation Profile  Recent Labs   Lab 07/28/20  0354 07/27/20  2223 07/27/20  1915 07/23/20  0835   INR  --   --  1.68* 1.06   PTT 34 31  --   --        IMAGING:   No results found for this or any previous visit (from the past 24 hour(s)).

## 2020-07-28 NOTE — PROGRESS NOTES
CLINICAL NUTRITION SERVICES - ASSESSMENT NOTE    RECOMMENDATIONS FOR MDs/PROVIDERS TO ORDER:  Ability to advance TF to goal pending pt tolerance and lytes     Malnutrition  Unable to assess    Recommendations already ordered by Registered Dietitian (RD):  1.  Once confirm JT placement/approval for use post surgery, begin TF with Impact Peptide @ 10 ml/hr and advance by 10 ml q 24 hrs (and/or ONLY advance rate upon MD approval after daily evaluation) to goal: --  Impact Peptide @ goal 50 ml/hr (1200 ml/day) to provide 1800 kcals (29 kcal/kg/day), 113 g PRO (1.8 gm/kg/day), 924 ml free H2O, 77 g Fat (50% from MCTs), 168 g CHO and no Fiber daily.      2. Entered the following within TF order:  RN: Change 1 RELIZORB cartridge every 24 hours with TF rate at 10-20 ml/hr.  Change 1 RELIZORB cartridge every 12 hours with TF rates >20 ml/hr.  RN: Obtain cartridges from [facility] med room and/or [facility] unit nurse manager.  (Relizorb is a immobilized lipase cartridge used to hydrolyze fat within the TF formula for easier absorption s/p total pancreatectomy)    3. Entered the following within a patient care order:  RELIZORB CARTRIDGES  *Change 1 cartridge every 24 hours with TF rate @ 10-20 ml/hr  *Change 1 cartridge every 12 hours with TF rate >20 ml/hr  *Supplies: Obtain cartridges in the / unit med room or from [facility] unit Nurse Manager    4.  Once begin TFs, order multivitamin/mineral (15 ml/day via JT) to help ensure micronutrient needs being met with suspected hypermetabolic demands, anticipated slow adv of TFs to goal infusion and potential interruptions to TF infusions.    5.  Ordered to check Magnesium, Phosphorus levels over the next 5 days for monitoring with TF start    6.  Ordered to check prealbumin weekly for monitoring on immune modulating TF therapy.      7.  Discontinued oral pancreatic enzyme replacement therapy (PERT) - not necessary during inpatient stay      Future/Additional Recommendations:  Monitor  "tolerance to new tube feeds and electrolytes  Complete NFPA and diet hx as able/ appropriate.      REASON FOR ASSESSMENT  Dottie Hernandez is a 54 year old female seen by Registered Dietitian for Provider Order - Registered Dietitian to Assess and Order TF per Medical Nutrition protocol - Raheel Rosario MD     NUTRITION HISTORY  -Per review of EMR- unable to obtain any hx of RD visits, however pt was seen by provider on  3/11/2020 from OSH: She has had problems at some point with pancreatic enzyme insufficiency and she has been placed on Creon. However, she does not have diabetes. She has chronic pain now that it often colicky, mid abdominal and lasts oftentimes much of the day. Her amylase and lipase are normal, so I think this is probably pancreatic colic. She does not have steatorrhea anymore.    - Unable to obtain diet hx at this time. Pt is in significant pain.     CURRENT NUTRITION ORDERS  Diet:  NPO -  Anticipate prolonged NPO status post AIT  --  Pt NPO. NG to LIS. Jtube clamped and Gtube to gravity.    LABS  -Prealb 29 mg/dL (baseline, 31) - anticipate negative phase PROs to decline postop and good candidate for immune-modulating TF therapy.  -Vitamin A .56 (WNL)  -Vitamin E 8.0 (WNL)  -Vitamin D - none charted    MEDICATIONS  AquADEK - anticipate does not need based on above vitamin lab levels and recommend discontinue this supplementation (continue only with a regular multivitamin/mineral).    Creon 12 (3-4 capsules orally) - recommend discontinue oral enzyme dosing and change to alternative regimen via TF formula (see recs above)     ANTHROPOMETRICS  Height: 5' 5\"    Most Recent Weight: 147 lbs .75 oz   IBW: 56.8 kg  BMI: Normal BMI  Weight History: 3% wt loss in 3 months.  Wt Readings from Last 10 Encounters:   07/28/20 66.7 kg (147 lb 0.8 oz)   07/24/20 64 kg (141 lb)   07/24/20 64.3 kg (141 lb 11.2 oz)   07/23/20 63.8 kg (140 lb 10.5 oz)   04/30/20 65.8 kg (145 lb)     Dosing Weight: 63 kg " (actual weight upon admission)    ASSESSED NUTRITION NEEDS  Estimated Energy Needs: 8615-3572 kcals (25-30 Kcal/Kg)  Justification: post-op and pending EN absorption post total pancreatectomy  Estimated Protein Needs:  grams protein (1.3-1.8 g pro/Kg)+  Justification: post-op and pending EN absorption post total pancreatectomy  Estimated Fluid Needs: 1 mL/Kcal or per MD pending fluid status    PHYSICAL FINDINGS  See malnutrition section below.    MALNUTRITION  % Intake: Unable to assess  % Weight Loss: Weight loss does not meet criteria  Subcutaneous Fat Loss: Unable to assess  Muscle Loss: Unable to assess  Fluid Accumulation/Edema: Unable to assess  Malnutrition Diagnosis: Unable to determine due to RD unable to obtain NFPA nor diet hx at this time.     NUTRITION DIAGNOSIS  Inadequate oral intake r/t post op TPAIT AEB need for EN to provide 100% of estimated needs via J tube.     INTERVENTIONS  Implementation  -Nutrition education: Not appropriate at this time due to patient condition - pt in significant pain  -Collaboration and Referral of Nutrition care - Discussed plan for FEN/GI on rounds with MDs, begin EN @ 10 mlhr with Relizorb      Goals  1.  Tolerate adv of TF to goal infusion without sx of refeeding within the next 5 days.  2.  Once TF at goal, total ave EN intakes provide minimum of 25 kcal/kg/day and 1.3 g/kg/day (per 63 kg)      Monitoring/Evaluation  Progress toward goals will be monitored and evaluated per protocol.       Macy Ma, RD, MS, LD  SICU: [id number] *[phone]

## 2020-07-28 NOTE — PLAN OF CARE
OT 4A: Cancel and HOLD.  Acknowledge orders for OT eval and treat.  Pt activity currently very limited by pain.  PT to follow to address mobility as able.  Will monitor pt status and initiate OT services as needed once activity is better tolerated.

## 2020-07-28 NOTE — PLAN OF CARE
Admitted/transferred from: PACU   Reason for admission/transfer: continued monitoring  Patient status upon admission/transfer: stable.   Interventions: Pt settled in room.  Plan: Monitor pain management and blood sugars  2 RN skin assessment: completed by Iman Garzon RN and Elsie Mederos RN  Result of skin assessment and interventions/actions: Midline abdominal incision  Height, weight, drug calc weight: done  Patient belongings: mask  MDRO education (if applicable): n/a

## 2020-07-28 NOTE — PHARMACY-CONSULT NOTE
Pharmacy Tube Feeding Consult    Medication reviewed for administration by feeding tube and for potential food/drug interactions.    Recommendation: No changes are needed at this time.    Pharmacy will continue to follow as new medications are ordered.    Gena Bermeo, PharmD, BCCCP

## 2020-07-28 NOTE — PROGRESS NOTES
"   07/28/20 1100   Quick Adds   Type of Visit Initial PT Evaluation   Living Environment   Lives With child(ivon), adult;spouse   Living Arrangements house   Home Accessibility stairs to enter home;stairs within home   Number of Stairs, Main Entrance 1   Stair Railings, Main Entrance none   Number of Stairs, Within Home, Primary other (see comments)  (6 + 6 with landing to main level)   Stair Railings, Within Home, Primary railings on both sides of stairs   Transportation Anticipated car, drives self;family or friend will provide   Living Environment Comment pt lives in a house with SO and ind children currently though normally they are/will be all away at college. 1 son will be home all the time 2/2 COVID/online classes. Pt with walk-in shower. SO able to A prn.   Self-Care   Usual Activity Tolerance good   Current Activity Tolerance poor   Regular Exercise No   Activity/Exercise/Self-Care Comment Pt IND and relatively active at baseline.   Functional Level Prior   Ambulation 0-->independent   Transferring 0-->independent   Toileting 0-->independent   Bathing 0-->independent   Communication 0-->understands/communicates without difficulty   Swallowing 0-->swallows foods/liquids without difficulty   Cognition 0 - no cognition issues reported   Fall history within last six months no   Which of the above functional risks had a recent onset or change? ambulation;transferring;toileting;bathing;dressing;eating   Prior Functional Level Comment Previously IND.   General Information   Onset of Illness/Injury or Date of Surgery - Date 07/27/20   Referring Physician Renee Peña MD    Patient/Family Goals Statement Return to PLOF/work   Pertinent History of Current Problem (include personal factors and/or comorbidities that impact the POC) Per chart: \"54 year old female with a past medical history of season allergies, migraines, ANIBAL, and chronic pancreatitis 2/2 pancreatic divisim who undewent laparoscopic TPIAT.\" "   Precautions/Limitations abdominal precautions;fall precautions   Weight-Bearing Status - LUE other (see comments)  (<10#)   Weight-Bearing Status - RUE other (see comments)  (<10#)   General Info Comments Activity: up with assist   Cognitive Status Examination   Orientation orientation to person, place and time   Level of Consciousness alert;lethargic/somnolent   Follows Commands and Answers Questions 100% of the time   Personal Safety and Judgment intact   Memory intact   Pain Assessment   Patient Currently in Pain Yes, see Vital Sign flowsheet   Integumentary/Edema   Integumentary/Edema Comments Abdominal incision   Posture    Posture Forward head position;Protracted shoulders   Range of Motion (ROM)   ROM Comment WFL   Strength   Strength Comments WFL   Bed Mobility   Bed Mobility Comments min-mod A   Transfer Skills   Transfer Comments mod-max A    Gait   Gait Comments NA 2/2 pain   Balance   Balance Comments Impaired   General Therapy Interventions   Planned Therapy Interventions balance training;bed mobility training;gait training;strengthening;transfer training;risk factor education;home program guidelines;progressive activity/exercise   Clinical Impression   Criteria for Skilled Therapeutic Intervention yes, treatment indicated   PT Diagnosis Impaired functional mobility   Influenced by the following impairments Increased pain, decreased endurance, SOB   Functional limitations due to impairments supplemental O2, increased assistance/supervision, limited activity tolerance, no gait   Clinical Presentation Evolving/Changing   Clinical Presentation Rationale clinical judgement and chart review   Clinical Decision Making (Complexity) Moderate complexity   Therapy Frequency 6x/week   Predicted Duration of Therapy Intervention (days/wks) 1 week   Anticipated Discharge Disposition Home with Assist;Home with Home Therapy   Risk & Benefits of therapy have been explained Yes   Patient, Family & other staff in  "agreement with plan of care Yes   [facility] TM \"6 Clicks\"   2016, Trustees of [facility], under license to [employer].  All rights reserved.   6 Clicks Short Forms Basic Mobility Inpatient Short Form   [facility]  \"6 Clicks\" V.2 Basic Mobility Inpatient Short Form   1. Turning from your back to your side while in a flat bed without using bedrails? 3 - A Little   2. Moving from lying on your back to sitting on the side of a flat bed without using bedrails? 2 - A Lot   3. Moving to and from a bed to a chair (including a wheelchair)? 2 - A Lot   4. Standing up from a chair using your arms (e.g., wheelchair, or bedside chair)? 2 - A Lot   5. To walk in hospital room? 2 - A Lot   6. Climbing 3-5 steps with a railing? 1 - Total   Basic Mobility Raw Score (Score out of 24.Lower scores equate to lower levels of function) 12   Total Evaluation Time   Total Evaluation Time (Minutes) 10     "

## 2020-07-28 NOTE — PROGRESS NOTES
Report given to Iman Garzon RN on 4A.   Patient transported on cardiac monitor by RN and NST to pcu.

## 2020-07-28 NOTE — PROGRESS NOTES
Pancreatitis Service - Daily Progress Note  07/28/2020    Assessment & Plan: Dottie Hernandez is a 54 year old female with a past medical history significant for chronic pancreatitis that began 20 years ago attributed to pancreatic divisum, seasonal allergies, migraines, and ANIBAL. She is now s/p TPAIT and splenectomy with stent placement with Dr. Jarvis on 7/27/20.     Cardiorespiratory: Stable; weaned to RA. Continue pulmonary hygiene.   Heme:  Acute blood loss anemia: EBL intra-op 500mL; received 1unit pRBC. Hgb ~8 with morning labs; continue to monitor.  GI/Nutrition: Remove NG tube. Keep G-tube to gravity. Start trickle feeds via J-tube. Continue multivitamin.  Fluid/Electrolytes: D5LR at 125mL/hr; electrolyte replacements per ICU protocol  : Armendariz to remain due to strict I&O  Post-pancreatectomy diabetes: BG 's with insulin drip at 0.5-3units/hr, appreciate Endocrine consult.  Infection: Afebrile; WBC 14.8 (13.5), continue to monitor  Positive islet culture: growing gram negative rods. Start zosyn x5 days.  Prophylaxis: PPI (Protonix), Anticoagulation: increase heparin drip to 400 units/hr, fungal (fluconazole)  Pain control: fair, pain team following. Current regimen:  -Precedex drip  -On-Q continuous paravertebral blocks  -Dilaudid PCA 0.2mg Q10min + 0.2mg/hr  -gabapentin 300mg BID  -ketamine 5-10mg Q6H  -lorazepam 0.5mg Q6H  -hydroxyzine 25mg Q6H PRN  -oxycodone 5mg Q4H PRN  Activity: as tolerated; PT/OT ordered  Anticipated LOS/Discharge: anticipate ~7 days; SICU while on Precedex drip    Medical Decision Making: High  Subsequent visit 07437 (high level decision making)    ANGELO/Fellow/Resident Provider: Dione Thomas NP [id number]    Faculty: Steve Jarvis MD  __________________________________________________________________  Transplant History: Admitted 7/27/2020 for TPAIT  7/27/2020 ([facility]), Postoperative day: 1     Interval History: History is obtained from the patient  Patient reports pain  "control is \"so so.\" She denies nausea. She has not passed gas. She has not been out of bed yet.    ROS:   A 10-point review of systems was negative except as noted above.    Curent Meds:    amylase-lipase-protease  3-4 capsule Oral TID w/meals     fluconazole  400 mg Intravenous Q24H     gabapentin  300 mg Oral or J tube BID     ketamine  5-10 mg Intravenous Q6H     LORazepam  0.5 mg Intravenous Q6H     multivitamin CF FORMULA  2.5 mL Oral or J tube Daily     pantoprazole  40 mg Oral or J tube Daily     piperacillin-tazobactam  3.375 g Intravenous Q8H     potassium chloride  20 mEq Intravenous Once     disposable pump w/ anesthetic (select flow)   Irrigation Continuous Nerve Block     sodium chloride (PF)  3 mL Intracatheter Q8H       Physical Exam:     Admit Weight: 63.1 kg (139 lb 1.8 oz)    Current Vitals:   BP 97/63   Pulse 97   Temp 97.9  F (36.6  C) (Oral)   Resp 11   Ht 1.651 m (5' 5\")   Wt 66.7 kg (147 lb 0.8 oz)   SpO2 98%   BMI 24.47 kg/m      CVP (mmHg): 7 mmHg    Vital sign ranges:    Temp:  [96.8  F (36  C)-97.9  F (36.6  C)] 97.9  F (36.6  C)  Pulse:  [] 97  Heart Rate:  [] 95  Resp:  [6-28] 11  BP: ()/(51-95) 97/63  MAP:  [60 mmHg-103 mmHg] 75 mmHg  Arterial Line BP: ()/(46-91) 105/60  SpO2:  [90 %-100 %] 98 %  Patient Vitals for the past 24 hrs:   BP Temp Temp src Pulse Heart Rate Resp SpO2 Weight   07/28/20 0700 97/63 -- -- 97 95 11 98 % --   07/28/20 0645 -- -- -- 86 88 11 96 % --   07/28/20 0630 (!) 87/55 -- -- 87 88 9 97 % --   07/28/20 0615 121/75 -- -- 89 92 9 97 % --   07/28/20 0600 128/77 -- -- 89 90 11 97 % --   07/28/20 0545 125/80 -- -- 89 92 12 94 % --   07/28/20 0530 137/77 -- -- 93 94 12 97 % --   07/28/20 0515 137/80 -- -- 92 97 13 97 % --   07/28/20 0500 -- -- -- 86 96 11 98 % --   07/28/20 0445 -- -- -- 89 92 13 98 % --   07/28/20 0430 -- -- -- 90 89 12 95 % --   07/28/20 0415 -- -- -- 89 89 13 97 % --   07/28/20 0400 -- 97.9  F (36.6  C) Oral 88 87 12 " 99 % --   07/28/20 0345 101/73 -- -- 103 101 (!) 6 96 % --   07/28/20 0330 -- -- -- 84 84 11 99 % --   07/28/20 0315 -- -- -- 83 83 9 99 % --   07/28/20 0300 -- -- -- 84 85 9 96 % --   07/28/20 0245 -- -- -- 87 86 8 95 % --   07/28/20 0230 -- -- -- 86 86 10 96 % --   07/28/20 0215 97/86 -- -- 94 93 9 95 % --   07/28/20 0200 113/68 -- -- 95 102 28 100 % --   07/28/20 0145 118/82 -- -- 80 73 9 98 % --   07/28/20 0130 104/62 -- -- 59 68 10 98 % --   07/28/20 0115 105/63 -- -- 80 80 10 97 % --   07/28/20 0100 103/66 -- -- 70 73 (!) 7 97 % --   07/28/20 0045 123/76 -- -- 86 71 9 93 % --   07/28/20 0030 (!) 125/95 -- -- 101 98 (!) 7 100 % 66.7 kg (147 lb 0.8 oz)   07/28/20 0015 91/59 -- -- 90 91 11 96 % --   07/28/20 0000 135/81 97.6  F (36.4  C) Oral 108 117 14 93 % --   07/27/20 2345 101/73 -- -- 98 98 28 92 % --   07/27/20 2330 (!) 89/51 -- -- 85 85 17 90 % --   07/27/20 2315 (!) 88/57 -- -- 91 93 10 92 % --   07/27/20 2300 (!) 81/59 -- -- -- 83 11 97 % --   07/27/20 2245 -- -- -- -- 85 10 92 % --   07/27/20 2230 (!) 82/51 -- -- -- 86 -- 97 % --   07/27/20 2215 -- -- -- -- 82 -- 97 % --   07/27/20 2200 -- 97.4  F (36.3  C) Oral -- 81 -- 100 % --   07/27/20 2130 92/53 -- -- 85 82 10 97 % --   07/27/20 2115 90/52 -- -- -- 89 13 99 % --   07/27/20 2100 97/58 -- -- 88 90 10 97 % --   07/27/20 2045 100/56 -- -- -- 92 (!) 7 97 % --   07/27/20 2030 109/65 96.9  F (36.1  C) Oral 97 96 11 99 % --   07/27/20 2015 115/68 -- -- -- 97 8 98 % --   07/27/20 2000 110/70 -- -- 103 97 10 99 % --   07/27/20 1945 90/70 -- -- -- 98 18 95 % --   07/27/20 1930 104/89 -- -- 98 98 8 91 % --   07/27/20 1915 98/67 -- -- -- 94 12 95 % --   07/27/20 1900 106/68 97  F (36.1  C) Axillary 101 93 8 95 % --   07/27/20 1845 122/76 -- -- -- 93 16 97 % --   07/27/20 1830 -- 96.8  F (36  C) Oral 87 -- 16 -- --     General Appearance: in no apparent distress.   Skin: normal, warm, dry, no suspicious lesions or rashes  HEENT: NG tube to suction with   Heart:  regular rate and rhythm  Lungs: non-labored breathing on RA  Abdomen: The abdomen is rounded, soft and mildly tender to palpation. The wound is covered; scant drainage marked. G tube to gravity. WILMA: thin dark red output, small   : zaragoza is present.    Extremities: edema: present generalized.     Data:   CMP  Recent Labs   Lab 07/28/20  0354 07/27/20 1915 07/27/20  1708 07/27/20  1503    142 141 140   POTASSIUM 3.3* 4.2 3.7 4.2   CHLORIDE 112* 112*  --   --    CO2 27 26  --   --    * 100* 95 134*   BUN 7 7  --   --    CR 0.60 0.56  --   --    GFRESTIMATED >90 >90  --   --    GFRESTBLACK >90 >90  --   --    MONICA 7.7* 7.7*  --   --    ICAW  --   --  4.5 4.5   MAG 1.7  --   --   --    PHOS 2.1*  --   --   --    AMYLASE 76  --   --   --    LIPASE 981*  --   --   --    ALBUMIN 2.7* 3.0*  --   --    BILITOTAL 0.3 0.6  --   --    ALKPHOS 39* 39*  --   --    AST 59* 89*  --   --    ALT 52* 57*  --   --      CBC  Recent Labs   Lab 07/28/20 0354 07/28/20  0015  07/23/20  0835   HGB 8.1* 8.0*   < > 14.5   WBC 14.8* 13.5*   < > 5.2    147*   < > 292   A1C  --   --   --  6.0*    < > = values in this interval not displayed.     Coags  Recent Labs   Lab 07/28/20  0354 07/27/20  2223 07/27/20  1915 07/23/20  0835   INR  --   --  1.68* 1.06   PTT 34 31  --   --       Urinalysis  Recent Labs   Lab Test 07/23/20  0805   COLOR Yellow   APPEARANCE Slightly Cloudy   URINEGLC Negative   URINEBILI Negative   URINEKETONE 5*   SG 1.020   UBLD Negative   URINEPH 6.0   PROTEIN Negative   NITRITE Negative   LEUKEST Large*   RBCU 6*   WBCU 94*

## 2020-07-28 NOTE — OR NURSING
Dr. Jarvis at the bedside and aware of blood tinge NG output. RN aware of new orders to treat pain and order for insulin.

## 2020-07-28 NOTE — PROGRESS NOTES
REGIONAL ANESTHESIA PAIN SERVICE CONTINUOUS NERVE INFUSION NOTE  Dottie Hernandez is a 54 year old female PMHx of migraines, ANIBAL, and cchronic pancreatitis 2/2 pancreatic divisum who is POD # 1 s/p laparoscopic TPIAT,  Splenectomy, gastrojejunostomy placement and placement of bilateral T7-8 paravertebral (PV) catheters (7/27/2020) for post operative pain control.    Subjective and Interval History: Overnight events: apneic episode after Dilaudid PCA dose and lorazepam administration-improved now.  Patient seen at 0845.  Received bolus at 0850. Reports moderate pain control with nerve block continuous infusion and current analgesics (see below: PCA Dilaudid, Precedex gtt).  Denies weakness, paresthesias, circumoral numbness, metallic taste or tinnitus. Patient is bed resting, not OOB yet.  NPO at this time, denies nausea.    Pain Intensity using Numerical Rating Scale (NRS): 5/10 at rest.  8/10 with activity      Antithrombotic/Thrombolytic Therapy ordered:  heparin  infusion 200 Units/hr, IV, Continuous       Analgesic Medications:   Medications related to Pain Management (From now, onward)    Start     Dose/Rate Route Frequency Ordered Stop    07/28/20 0800  gabapentin (NEURONTIN) solution 300 mg      300 mg Oral or J tube 2 TIMES DAILY 07/27/20 2200 07/28/20 0100  ketamine (KETALAR) injection 5-10 mg      5-10 mg  over 2-5 Minutes Intravenous EVERY 6 HOURS 07/27/20 2225 07/27/20 2216  oxyCODONE (ROXICODONE) solution 5 mg      5 mg Oral EVERY 4 HOURS PRN 07/27/20 2217 07/27/20 2200  lidocaine 1 % 0.1-1 mL      0.1-1 mL Other EVERY 1 HOUR PRN 07/27/20 2200 07/27/20 2200  lidocaine (LMX4) cream       Topical EVERY 1 HOUR PRN 07/27/20 2200      07/27/20 2200  hydrOXYzine (ATARAX) syrup 25 mg      25 mg Oral EVERY 6 HOURS PRN 07/27/20 2200 07/27/20 2200  LORazepam (ATIVAN) injection 0.5 mg      0.5 mg Intravenous EVERY 6 HOURS 07/27/20 1859      07/27/20 1915  HYDROmorphone (DILAUDID) PCA 0.2  mg/mL OPIOID TOLERANT       Intravenous CONTINUOUS 07/27/20 1900      07/27/20 1900  dexmedetomidine (PRECEDEX) 400 mcg in 0.9% sodium chloride 100 mL      0.2-0.7 mcg/kg/hr × 63.1 kg  3.2-11 mL/hr  Intravenous CONTINUOUS 07/27/20 1859      07/27/20 0930  ropivacaine 0.2% (NAROPIN) 750 mL in ON-Q C-Bloc select flow ([id number] holds 600-750 mL) dual cath disposable pump      14 mL/hr  Irrigation Continuous Nerve Block 07/27/20 0928      07/27/20 0700  SUFentanil (SUFENTA) 100 mcg in sodium chloride 0.9 % 50 mL infusion      0.5-1 mcg/kg/hr × 63.1 kg  15.8-31.6 mL/hr  Intravenous CONTINUOUS 07/27/20 0653      07/27/20 0543  lidocaine 1 % 0.1-1 mL      0.1-1 mL Other EVERY 1 HOUR PRN 07/27/20 0543      07/27/20 0543  lidocaine (LMX4) cream       Topical EVERY 1 HOUR PRN 07/27/20 0543             Objective:  Lab results  Recent Labs   Lab Test 07/28/20  0354   WBC 14.8*   RBC 2.83*   HGB 8.1*   HCT 24.9*   MCV 88   MCH 28.6   MCHC 32.5   RDW 13.2          Lab Results   Component Value Date    INR 1.68 07/27/2020    INR 1.06 07/23/2020       Vitals:    Temp:  [96.8  F (36  C)-97.9  F (36.6  C)] 97.9  F (36.6  C)  Pulse:  [] 97  Heart Rate:  [] 95  Resp:  [6-28] 11  BP: ()/(51-95) 97/63  MAP:  [60 mmHg-103 mmHg] 75 mmHg  Arterial Line BP: ()/(46-91) 105/60  SpO2:  [90 %-100 %] 98 %    Exam:   GEN: alert and no distress  NEURO/MSK: Moving UE and LE spontaneously.  Strength LE 5/5  and overall symmetric.  SKIN: bilateral paravertebral (PV) catheter sites with dressing c/d/i, no tenderness, erythema, heme, edema      Assessment and Plan:     Patient is receiving moderate analgesia with current multimodal therapy including bilateral T7-8 paravertebral (PV) catheters with infusion of Ropivacaine 0.2% at 14 mL/hr, 7 mL/hr each catheter.  Motor function intact and is meeting activity goals.  No evidence of adverse side effects related to local anesthetic.    Bolus administered at 0850    MEDICATION:  PF bupivacaine  0.25%, total bolus 10mL, via 5ml on left and right catheter.    PROCEDURE: Clinician bolus administered via T7/8 paravertebral nerve block catheters, without complication, negative aspirate before and between each mL.  No symptoms of local anesthetic systemic toxicity (LAST). Remained with and assessed patient for 10 min post-injection. BP, P and MAP stable    POST-PROCEDURE: Bedside RN aware of need to continue BP, P and MAP monitoring Q 10 min for an additional 20 min. Contact RAPS (jobcode ID [id number]) if any of the following: patient experiencing any untoward effects, SBP< 90, P < 50 or > 120, MAP < 60     - patient can be evaluated to receive local anesthetic bolus Q 12 hr PRN pain not controlled with continuous infusion.  Bedside nurse must page RAPS to request bolus    - continue Ropivacaine 0.2% infusion rate at 14 mL/hr, 7mL/hr each catheter, POD #1, CD#1  - antithrombotic/thrombolytic therapy: heparin  infusion 200 Units/hr, IV, Continuous  as ordered. Please contact RAPS (#[phone]) prior to any medication changes  - will continue to follow and adjust as needed    - discussed plan with attending anesthesiologist    Madisyn Lyons PA-C  [facility]  7/28/2020 8:05 AM    RAPS Contact Info (24 hour job code pager is the last 4 digits) For in-house use only:   Job code ID: [id number]   [facility] [place] [facility]  Peds 0602  pbsi phone: dial * * * [phone], enter jobcode ID, then enter call-back number.    Text: Use [employer] on the Intranet <Paging/Directory> tab and enter Jobcode ID.   If no call back at any time, contact the hospital  and ask for RAPS attending or backup

## 2020-07-28 NOTE — PLAN OF CARE
Neuro: Pt lethargic but oriented x4. Pt follows commands and moves all extremities. Precedex and Dilaudid (continuous and PCA) used for comfortable pain management.   Scheduled Lorazepam given at 2319; pt became obtunded and apnic, nasal trumpet placed with no response from pt; MD notified - Percedex gtt decreased per MD and scheduled 0100 Ketamine dose held per MD.    Cardiac: SR. HR 80s-90s. MAPs 60-65 after PCA dose and scheduled Lorazepam dose (MD aware) otherwise MAPs>65.    Respiratory: On 3L Oxymask. Nasal trumpet placed due to apnea. Lung sounds clear/diminished.    GI/: Pt NPO. NG to LIS. Jtube clamped and Gtube to gravity. Urine output 40-50ml/hr. LBM 7/26. Insulin gtt titrated per algorithm.     Incisons: Abdominal incision dressing with scant amount of unchanged drainage present. RUQ WILMA with 5-20ml/hr of serosanguinous drainage present.     Lines: PIVx1. Left radial arterial line - positional. R subclavian CVC x3. D5LR gtt and Heparin gtt infusing.     No family called RN for update.  Continue to monitor and update MD as needed. Continue plan of care.       Problem: Adult Inpatient Plan of Care  Goal: Plan of Care Review  Outcome: No Change

## 2020-07-28 NOTE — PLAN OF CARE
Neuro: A&O, intact. Pale cool extremities.  Cardiac: Sinus rhythm, tachycardic this evening sustaining in 120's.   Respiratory: RA while awake, hx of ANIBAL and occasionally needing 3L per oxymask while asleep.  GI: GJ patent, G to gravity and J started TF at 10mL/hr today, held for 2 hours d/t pt reporting pain/pressure in RLQ that moved to mid abdomen, currently running. Manual free water flush 30mL q4 hours. BS hypoactive, no BM or flatus. WILMA from R abdomen, serosanguinous drainage.  : Armendariz present for strict I&Os, good output.  Skin: Midline abdominal incision, dressing with marked dried drainage, no changes. Pale, cool extremities.  Activity: Up to chair x1 for 1-1.5 hours with x1 assist, turning in bed well.  Pain: Worse with activity, intermittent increases in pain. PCA dose increased, precedex increased, ropivacaine boluses given by anesthesia. Scheduled ketamine and ativan given.  LDA's: L PIV leaking, removed. R triple lumen subclavian infusing drips, distal lumen set up for CVP monitoring. L radial art line positional.   Drips: D5LR at 125mL/hr, precedex at 0.2mcg/kg/hr, insulin infusion per MAR, heparin at 200mL/hr, dilaudid PCA basal rate 0.2mg/hr with 0.3mg PCA dose q10 minutes. Ropivacaine per ON-Q, x2 infusions at 7ml/hr each.  Labs: Potassium low, replaced, next check at 2145. Magnesium and phosphorus replaced with rechecks tomorrow AM. Hemoglobin checks q8 hours, last check 7.1 down from 8.1. WBCs elevated. Positive cultures from abdominal fluid.    Plan: Continue to monitor and treat pain, titrate insulin drip to BG goal of , monitor for TF tolerance and return of bowel function, monitor for s/s of bleeding.

## 2020-07-28 NOTE — CONSULTS
Diabetes/Hyperglycemia Management Consult    Chief Complaint s/p TPIAT  Consult requested by: Dr. 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 Jarvis 7/27/2020.   She received 1116 islet equivalents per kg body weight.  The patient has about 20year history of pancreatitis that started with symptoms  during her second pregnancy. Previous surgical history impacting the pancreas includes open sphincterotomy and the second was a Puestow in 2011. Followed by  revision of the Puestow 1/23/2019.     Prior to the pancreatectomy, she did have an A1C of 6.0% indicating pre-diabetes.     Her mixed meal c-peptide stimulation done on 7/23/2020 showed normal glucose tolerance. Islet cell, insulin, and glutamic acid decarboxylase antibodies were all negative on 7/23/2020     Component      Latest Ref Rng & Units 5/28/2020 5/28/2020 5/28/2020           8:58 AM 10:44 AM 11:41 AM   Glucose (External)      65 - 139 mg/dL  95 99   C Peptide (External)      Not established ng/mL 2.1 8.5 8.2   Insulin Antibodies      0.0 - 0.4 U/mL      Glutamic Acid Decarboxylase Antibody      0.0 - 5.0 IU/mL      C-Peptide      0.9 - 6.9 ng/mL      Glucose      70 - 99 mg/dL        Component      Latest Ref Rng & Units 7/23/2020 7/23/2020 7/23/2020           8:35 AM 10:10 AM 11:04 AM   Glucose (External)      65 - 139 mg/dL      C Peptide (External)      Not established ng/mL      Insulin Antibodies      0.0 - 0.4 U/mL <0.4     Glutamic Acid Decarboxylase Antibody      0.0 - 5.0 IU/mL <5.0     C-Peptide      0.9 - 6.9 ng/mL 1.4 7.6 (H) 8.3 (H)   Glucose      70 - 99 mg/dL  122 (H) 136 (H)         Tube feeds in the form of Impact Peptide 1.5 will begin at 10 mL an hour soon. The target rate is has not bee noted.   The patient remains n.p.o. at this time and is on D5LR at 125.  Overnight, the glucose  has been 99- 150with insulin drip rates of 1-3 units/hr.    Pt met with the outpatient certified diabetes educator. Monica Bhatia RN, CDE on 7/23/2020, to learn glucose monitoring and have an introduction to insulin injections.   She received a Valeriy Contour Next glucometer. Per educator note, there was a discussion  on CGM, specifically a Dexcom, . Working with Dr. Hoang on this process.  Currently, sitting up in chair, NAD, denies fever, chills, chest pain, shortness of breath, abdominal pain, nausea and or vomiting, bowel or bladder issues.      Recent Labs   Lab 07/28/20  0803 07/28/20  0701 07/28/20  0602 07/28/20  0457 07/28/20  0355 07/28/20  0354 07/28/20  0302  07/27/20  1915  07/27/20  1708  07/27/20  1503  07/27/20  1400  07/27/20  1302   GLC  --   --   --   --   --  107*  --   --  100*  --  95  --  134*  --  115*  --  102*   * 111* 99 100* 107*  --  101*   < >  --    < >  --    < >  --    < >  --    < >  --     < > = values in this interval not displayed.         Diabetes Type: total pancreatectomy with auto-islet cell transplant   Diabetes Duration: surgery 7/27/2020      Diabetes Control:   Lab Results   Component Value Date    A1C 6.0 07/23/2020     Factors Impacting Glucose Control: surgery, NPo, enteral feeds      Review of Systems  10 point ROS completed with pertinent positives and negatives noted in the HPI    Past medical, family and social histories are reviewed and updated.    Past Medical History  Past Medical History:   Diagnosis Date     Allergies      Chronic pancreatitis (H)      Recurrent acute pancreatitis        Family History  No family history specific for diabetes  Family History   Problem Relation Age of Onset     Lymphoma Mother         non hodgkins lymphoma     Coronary Artery Disease Mother      Coronary Artery Disease Father      Diabetes No family hx of      Pancreatitis No family hx of      Anesthesia Reaction No family hx of      Deep Vein Thrombosis (DVT) No family  "hx of        Social History  Social History     Socioeconomic History     Marital status:      Spouse name: None     Number of children: None     Years of education: None     Highest education level: None   Occupational History     None   Social Needs     Financial resource strain: None     Food insecurity     Worry: None     Inability: None     Transportation needs     Medical: None     Non-medical: None   Tobacco Use     Smoking status: Never Smoker     Smokeless tobacco: Never Used   Substance and Sexual Activity     Alcohol use: Not Currently     Drug use: Never     Sexual activity: None   Lifestyle     Physical activity     Days per week: None     Minutes per session: None     Stress: None   Relationships     Social connections     Talks on phone: None     Gets together: None     Attends Scientology service: None     Active member of club or organization: None     Attends meetings of clubs or organizations: None     Relationship status: None     Intimate partner violence     Fear of current or ex partner: None     Emotionally abused: None     Physically abused: None     Forced sexual activity: None   Other Topics Concern     None   Social History Narrative    Dottie lives in [place] with her family and 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         Physical Exam  BP 92/60   Pulse 92   Temp 98.6  F (37  C) (Axillary)   Resp 12   Ht 1.651 m (5' 5\")   Wt 66.7 kg (147 lb 0.8 oz)   SpO2 96%   BMI 24.47 kg/m      General:  Pleasant, NAD, siting up in chair.   HEENT: PER and anicteric, non-injected, oral mucous membranes dry  Lungs:  Non-labored  ABD: soft  Skin: warm and dry   MSK:  Moving all extremteis  Mental status:  alert, oriented x3, communicating clearly, drowsy  Psych:  calm, even mood    Laboratory  Recent Labs   Lab Test 07/28/20  0354 07/27/20  1915    142   POTASSIUM 3.3* 4.2   CHLORIDE 112* 112*   CO2 27 26   ANIONGAP 4 4   * 100*   BUN 7 7   CR 0.60 0.56 "   MONICA 7.7* 7.7*     CBC RESULTS:   Recent Labs   Lab Test 07/28/20  0354   WBC 14.8*   RBC 2.83*   HGB 8.1*   HCT 24.9*   MCV 88   MCH 28.6   MCHC 32.5   RDW 13.2          Liver Function Studies -   Recent Labs   Lab Test 07/28/20  0354   PROTTOTAL 4.3*   ALBUMIN 2.7*   BILITOTAL 0.3   ALKPHOS 39*   AST 59*   ALT 52*       Active Medications  Current Facility-Administered Medications   Medication     - MEDICATION INSTRUCTIONS -     amylase-lipase-protease (CREON 12) 61323 units per capsule 36,000-48,000 Units     amylase-lipase-protease (CREON 12) 94143 units per capsule 36,000-48,000 Units     dexmedetomidine (PRECEDEX) 400 mcg in 0.9% sodium chloride 100 mL     dextrose 10% infusion     dextrose 5% in lactated ringers infusion     glucose gel 15-30 g    Or     dextrose 50 % injection 25-50 mL    Or     glucagon injection 1 mg     fluconazole (DIFLUCAN) intermittent infusion 400 mg in NaCl     gabapentin (NEURONTIN) solution 300 mg     heparin 25,000 units in 0.45% NaCl 250 mL ANTICOAGULANT  infusion     HYDROmorphone (DILAUDID) PCA 0.2 mg/mL OPIOID TOLERANT     hydrOXYzine (ATARAX) syrup 25 mg     insulin 1 unit/1mL in saline (NovoLIN-Regular) infusion- SOT TPIAT algorithm     ketamine (KETALAR) injection 5-10 mg     lactated ringers infusion     lidocaine (LMX4) cream     lidocaine 1 % 0.1-1 mL     LORazepam (ATIVAN) injection 0.5 mg     magnesium sulfate 2 g in water intermittent infusion     magnesium sulfate 4 g in 100 mL sterile water (premade)     multivitamin CF FORMULA (AquADEKS) liquid 2.5 mL     naloxone (NARCAN) injection 0.1-0.4 mg     No Anticoagulation unless approved by Anesthesia Provider.     ondansetron (ZOFRAN-ODT) ODT tab 4 mg    Or     ondansetron (ZOFRAN) injection 4 mg     oxyCODONE (ROXICODONE) solution 5 mg     pantoprazole (PROTONIX) 2 mg/mL suspension 40 mg     phenylephrine (MARTINE-SYNEPHRINE) 50 mg in sodium chloride 0.9 % 250 mL infusion     piperacillin-tazobactam (ZOSYN) 3.375 g  vial to attach to  mL bag     potassium chloride (KLOR-CON) Packet 20-40 mEq     potassium chloride 10 mEq in 100 mL intermittent infusion with 10 mg lidocaine     potassium chloride 10 mEq in 100 mL sterile water intermittent infusion (premix)     potassium chloride 20 mEq in 50 mL intermittent infusion     potassium chloride 20 mEq in 50 mL intermittent infusion     potassium chloride ER (KLOR-CON M) CR tablet 20-40 mEq     prochlorperazine (COMPAZINE) injection 10 mg    Or     prochlorperazine (COMPAZINE) tablet 10 mg     ropivacaine 0.2% (NAROPIN) 750 mL in ON-Q C-Bloc select flow ([id number] holds 600-750 mL) dual cath disposable pump     sodium chloride (PF) 0.9% PF flush 3 mL     sodium chloride (PF) 0.9% PF flush 3 mL     sodium phosphate 10 mmol in D5W intermittent infusion     sodium phosphate 15 mmol in D5W intermittent infusion     sodium phosphate 20 mmol in D5W intermittent infusion     sodium phosphate 25 mmol in D5W intermittent infusion     No current outpatient medications on file.       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 Jarvis 7/27/2020.   She received 1116 islet equivalents per kg body weight.    - enteral feeds have not started, now ordered Impact peptide 1.5 via jejunostomy to start at 10 ml/giur   -on D5LR at 125 ml/hour    total pancreatectomy with auto-islet cell transplant :  A1C on 7/23/2020 of 6.0% in the pre-diabetes range prior to surgery  Plan  - continue on IV insulin, will transition once tub feeds are at goal, dextrose fluids are discontinued and tolerating the tube feeds  -goal glucose   -hypoglycemia protocol  -will monitor peripherally until patient is on 7A    Alicia Bolden, [phone]  Diabetes Management Team job  code: [phone]    I spent a total of 80 minutes bedside and on the inpatient unit managing the glycemic care of Dottie Lopeztrup. Over 50% of my time on the unit was spent counseling the patient  and/or coordinating care regarding .  See note for details.

## 2020-07-28 NOTE — PROGRESS NOTES
REGIONAL ANESTHESIA PAIN SERVICE CONTINUOUS NERVE INFUSION NOTE  Dottie Hernandez is a 54 year old female PMHx of migraines, ANIBAL, and cchronic pancreatitis 2/2 pancreatic divisum who is POD # 1 s/p laparoscopic TPIAT,  Splenectomy, gastrojejunostomy placement and placement of bilateral T7-8 paravertebral (PV) catheters (7/27/2020) for post operative pain control.     Subjective and Interval History:  Reports significant breakthrough pain through she does report pain relief for few hours after catheter bolus. She has PCA Dilaudid, Precedex gtt.  Denies weakness, paresthesias, circumoral numbness, metallic taste or tinnitus.      Vitals:    Pulse:  137  BP: 130/990     Exam:   SKIN: bilateral paravertebral (PV) catheter sites with dressing c/d/i, no tenderness, erythema, heme, edema     Assessment and Plan:     Patient is receiving moderate analgesia with current multimodal therapy including bilateral T7-8 paravertebral (PV) catheters with infusion of Ropivacaine 0.2% at 14 mL/hr, 7 mL/hr each catheter.  No evidence of adverse side effects related to local anesthetic.     Bolus administered at 1830    MEDICATION: PF bupivacaine  0.25%, total bolus 10mL, via 5ml on left and right catheter.    PROCEDURE: Clinician bolus administered via T7/8 paravertebral nerve block catheters, without complication, negative aspirate before and between each mL.  No symptoms of local anesthetic systemic toxicity (LAST). Remained with and assessed patient for 10 min post-injection. BP, P and MAP stable    POST-PROCEDURE: Bedside RN aware of need to continue BP, P and MAP monitoring Q 10 min for an additional 20 min. Contact RAPS (jobcode ID [id number]) if any of the following: patient experiencing any untoward effects, SBP< 90, P < 50 or > 120, MAP < 60                - patient can be evaluated to receive local anesthetic bolus Q 12 hr PRN pain not controlled with continuous infusion.  Bedside nurse must page RAPS to request bolus  - continue  Ropivacaine 0.2% infusion rate at 14 mL/hr, 7mL/hr each catheter, POD #1, CD#1    Zee Khan MD  6:33 PM     RAPS Contact Info (24 hour job code pager is the last 4 digits) For in-house use only:   Job code ID: [id number]   [place] [id number]  Peds [phone]  [place] phone: dial [phone], enter jobcode ID, then enter call-back number.    Text: Use [employer] on the Intranet <Paging/Directory> tab and enter Jobcode ID.   If no call back at any time, contact the hospital  and ask for RAPS attending or backup

## 2020-07-28 NOTE — PLAN OF CARE
4A Discharge Planner PT   Patient plan for discharge: home with assist prn from family  Current status: PT evaluation completed. Treatment indicated as pt is below baseline for functional mobility post TPIAT. Mainly limited by pain and limited activity tolerance. SO present at bedside. Pt on 2-3 LPMs of O2 via NC. Educated pt on abdominal precautions. Supine > EOB, mod A 2/2 pain and assist for log roll technique. Tolerated sitting EOB for ~ 2 minutes prior to standing. STS, mod-max A x 1 2/2 pain. EOB > recliner, min A x 1 + SBA x 1 for line management. Poor eccentric control to sitting. Desated to mid 80s after/during transitions in positions - recovered with cues for deep breathing.  Recs up with A x 1-2 for pain and line management.     Barriers to return to prior living situation: medical status, current mob, pain, limited activity tolerance, stairs to enter home  Recommendations for discharge: Anticipate with improved pain management and IP therapy pt will progress to home with assist prn  Rationale for recommendations: POD 1. Pt is motivated, good home support, and ind/active prior to procedure. Limited OOB activity 2/2 pain.         Entered by: Dorothea Moreno 07/28/2020 10:44 AM

## 2020-07-28 NOTE — ANESTHESIA POSTPROCEDURE EVALUATION
Anesthesia POST Procedure Evaluation    Patient: Dottie Hernandez   MRN:     [id number] Gender:   female   Age:    54 year old :      1965        Preoperative Diagnosis: Pancreatitis [K85.90]   Procedure(s):  Laproscopipc PANCREATECTOMY, TOTAL, WITH AUTOLOGOUS PANCREATIC ISLET CELL TRANSPLANT, Splenectomy, gastrojejunostomy placement  Biopsy liver   Postop Comments: No value filed.     Anesthesia Type: General       Disposition: ICU            ICU Sign Out: Anesthesiologist/ICU physician sign out WAS performed   Postop Pain Control: Uneventful            Sign Out: Well controlled pain   PONV: No   Neuro/Psych: Uneventful            Sign Out: Acceptable/Baseline neuro status   Airway/Respiratory: Uneventful            Sign Out: Acceptable/Baseline resp. status   CV/Hemodynamics: Uneventful            Sign Out: Acceptable CV status   Other NRE: NONE   DID A NON-ROUTINE EVENT OCCUR? No         Last Anesthesia Record Vitals:  CRNA VITALS  2020 1757 - 2020 1857      2020             Pulse:  82    SpO2:  98 %          Last PACU Vitals:  Vitals Value Taken Time   BP 82/51 2020 10:36 PM   Temp 36.3  C (97.4  F) 2020 10:00 PM   Pulse 85 2020 10:36 PM   Resp 13 2020 10:58 PM   SpO2 82 % 2020 10:58 PM   Temp src     NIBP     Pulse     SpO2     Resp     Temp     Ht Rate     Temp 2     Vitals shown include unvalidated device data.      Electronically Signed By: Elsie Pagan MD, 2020, 10:58 PM

## 2020-07-28 NOTE — H&P
SURGICAL ICU ADMISSION NOTE  07/27/2020      PRIMARY TEAM: Transplant  PRIMARY PHYSICIAN: Matheus  REASON FOR CRITICAL CARE ADMISSION: close hemodynamic monitoring post-operatively   ADMITTING PHYSICIAN: Perlman  Date of Service (when I saw the patient): 07/27/2020    ASSESSMENT: Dottie Hernandez is a 54 year old female with a past medical history of season allergies, migraines, ANIBAL, and chronic pancreatitis 2/2 pancreatic divisim who undewent laparoscopic TPIAT with Dr. Jarvis 7/27/2020.     PLAN:    Neurological:  # Acute pain   # Agitation   - Monitor neurological status. Delirium preventions and precautions.   - Pain: dilaudid PCA with basal rate, jimbo ketamine, jimbo ativan, Ropivacaine ON-Q    - Sedation plan: precedex gtt    Pulmonary:   # Post operative ventilatory support  # Acute hypoxic respiratory support   - Supplemental oxygen to keep saturation above 92 %. Currently on 3L NC  - Incentive spirometer every 15- 30 minutes when awake.    Cardiovascular:    - Monitor hemodynamic status.      Gastroenterology/Nutrition:  - NPO, ok for ice and meds   - Nutrition consult in AM for TF   - G to gravity, J capped   - Protonix 40mg daily  - Zofran PRN     Fluids/Electrolytes:   - Post-op Cr 0.56, Na 142, K 4.2  - Elevated LFT, expected    Renal:  - Urine output is adequate at 1.2 mL/kr/hr POD0  . Will continue to monitor intake and output.  - Armendariz in place    Endocrine:  #s/p TPIAT  - Insulin gtt/ D10 gtt  - target range for BG       ID:  # Leukocytosis   - Invanz 6h post last dose x1  - Intra-op panc islet culture sent, results pending    Heme:     # Acute blood loss anemia   - EBL 500mL, 1u pRBC, 1,000m: 5% albuimin, 3L Plasma- Lyte intra-op   - Hemoglobin 8.8 pos-op  - Transfuse if hgb <7.0 or signs/symptoms of hypoperfusion. Monitor and trend.     Musculoskeletal:  # Weakness and deconditioning of critical illness   - Physical and occupational therapy consult     Skin:  Flexitrac to tubes      General Cares/Prophylaxis:    DVT Prophylaxis: Pneumatic Compression Devices  GI Prophylaxis: PPI  Restraints: Restraints for medical healing needed: NO    Lines/ tubes/ drains:  - PIV, CVC, zaragoza, radioal A line, NJ, GJ, WILMA     Disposition:  - Surgical ICU.    Patient seen, findings and plan discussed with surgical ICU staff, Dr. Perlman.    Time Spent on this Encounter   Billing:  I spent 60 minutes bedside and on the inpatient unit today managing the critical care of Dottie Hernandez in relation to the issues listed in this note.    Gena Abdullahi MD  Surgery Resident PGY-2  Pg [phone]    - - - - - - - - - - - - - - - - - - - - - - - - - - - - - - - - - - - - - - - - - - - - - -   HISTORY PRESENTING ILLNESS:   Dottie Hernanedz is a 54 year old female with a past medical history of season allergies, migraines, ANIBAL, and chronic pancreatitis 2/2 pancreatic divisim who undewent laparoscopic TPIAT with Dr. Jarvis 7/27/2020.     Patient was diagnosed with acute pancreatitis approximately 20 years ago which was attributed to pancreatic divisim. She had attacks during pregnancy resulting in exploratory laparotomy at 8weeks gestation and early delivery of her third child. Overtime, her acute episodes developed into chronic pancreatitis which have developed into recurrent pain with new strictures. Prior surgical history includes appendectomy, cholecystectomy, ERCP with stenting, and Pe[place]ow x3. Prior to admission medications include daily Norco, Flonase, and prn Zofran.     REVIEW OF SYSTEMS: 10 point ROS neg other than the symptoms noted above in the HPI.    PAST MEDICAL HISTORY:   Past Medical History:   Diagnosis Date     Allergies      Chronic pancreatitis (H)      Recurrent acute pancreatitis        SURGICAL HISTORY:   Past Surgical History:   Procedure Laterality Date     APPENDECTOMY       LAPAROTOMY EXPLORATORY      approximately 2002, during pregnancy for pain     open sphincteroplasty/sphincterotomy   04/2011     PANCREATECTOMY PEUSTOW 07/2011     PANCREATECTOMY PEUSTOW 01/2019    revision       SOCIAL HISTORY:   Social History     Socioeconomic History     Marital status:      Spouse name: None     Number of children: None     Years of education: None     Highest education level: None   Occupational History     None   Social Needs     Financial resource strain: None     Food insecurity     Worry: None     Inability: None     Transportation needs     Medical: None     Non-medical: None   Tobacco Use     Smoking status: Never Smoker     Smokeless tobacco: Never Used   Substance and Sexual Activity     Alcohol use: Not Currently     Drug use: Never     Sexual activity: None   Lifestyle     Physical activity     Days per week: None     Minutes per session: None     Stress: None   Relationships     Social connections     Talks on phone: None     Gets together: None     Attends Mandaeism service: None     Active member of club or organization: None     Attends meetings of clubs or organizations: None     Relationship status: None     Intimate partner violence     Fear of current or ex partner: None     Emotionally abused: None     Physically abused: None     Forced sexual activity: None   Other Topics Concern     None   Social History Narrative    Dottie lives in [place] with her family and 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     FAMILY HISTORY: No bleeding/clotting disorders nor problems with anesthesia.     ALLERGIES:   No Known Allergies    MEDICATIONS:      PHYSICAL EXAMINATION:  Temp:  [96.8  F (36  C)-98.7  F (37.1  C)] 96.9  F (36.1  C)  Pulse:  [] 85  Heart Rate:  [82-98] 82  Resp:  [7-18] 10  BP: ()/() 92/53  MAP:  [65 mmHg-97 mmHg] 65 mmHg  Arterial Line BP: ()/(49-65) 95/49  SpO2:  [91 %-100 %] 97 %     General: PENNY -1, resting   Neuro: A&Ox3, NAD  Pulm/Resp: Clear breath sounds bilaterally without rhonchi, crackles or wheeze,  breathing non-labored,  tolerating NC @ 3L  CV: RRR  Abdomen: Soft, non-distended, no guarding, no masses. Appropriately TTP  : zaragoza catheter in place, urine yellow and clear  Incisions/Skin: Midline mini-lap with dressing with stigmata, WILMA in RLQ draining serosang, GJ in LUQ  MSK/Extremities:  moving all extremities, peripheral pulses intact,  extremities well perfused    LABS: Reviewed.   Arterial Blood Gases   Recent Labs   Lab 07/27/20 1708 07/27/20 1503 07/27/20 1400 07/27/20  1302   PH 7.39 7.37 7.40 7.42   PCO2 37 40 39 38   PO2 171* 143* 164* 221*   HCO3 23 23 24 25     Complete Blood Count   Recent Labs   Lab 07/27/20 1915 07/27/20 1708 07/27/20 1503 07/27/20  1400  07/23/20  0835   WBC 10.9  --   --   --   --  5.2   HGB 8.8* 8.9* 10.2* 9.6*   < > 14.5   *  --   --   --   --  292    < > = values in this interval not displayed.     Basic Metabolic Panel  Recent Labs   Lab 07/27/20 1915 07/27/20 1708 07/27/20  1503 07/27/20  1400  07/23/20  0835    141 140 141   < > 140   POTASSIUM 4.2 3.7 4.2 4.1   < > 3.6   CHLORIDE 112*  --   --   --   --  107   CO2 26  --   --   --   --  28   BUN 7  --   --   --   --  10   CR 0.56  --   --   --   --  0.62   * 95 134* 115*   < > 106*    < > = values in this interval not displayed.     Liver Function Tests  Recent Labs   Lab 07/27/20 1915 07/23/20  0835   AST 89* 17   ALT 57* 36   ALKPHOS 39* 108   BILITOTAL 0.6 0.4   ALBUMIN 3.0* 4.5   INR 1.68* 1.06     Pancreatic Enzymes  No lab results found in last 7 days.  Coagulation Profile  Recent Labs   Lab 07/27/20 1915 07/23/20  0835   INR 1.68* 1.06       IMAGING:  Recent Results (from the past 24 hour(s))   POC US GUIDANCE NEEDLE PLACEMENT    Impression    Bilateral PVC

## 2020-07-29 ENCOUNTER — APPOINTMENT (OUTPATIENT)
Dept: PHYSICAL THERAPY | Facility: CLINIC | Age: 55
End: 2020-07-29
Attending: TRANSPLANT SURGERY
Payer: COMMERCIAL

## 2020-07-29 ENCOUNTER — APPOINTMENT (OUTPATIENT)
Dept: GENERAL RADIOLOGY | Facility: CLINIC | Age: 55
End: 2020-07-29
Attending: TRANSPLANT SURGERY
Payer: COMMERCIAL

## 2020-07-29 LAB
ALBUMIN UR-MCNC: NEGATIVE MG/DL
ANION GAP SERPL CALCULATED.3IONS-SCNC: 2 MMOL/L (ref 3–14)
ANION GAP SERPL CALCULATED.3IONS-SCNC: 3 MMOL/L (ref 3–14)
ANION GAP SERPL CALCULATED.3IONS-SCNC: 5 MMOL/L (ref 3–14)
APPEARANCE UR: CLEAR
APTT PPP: 31 SEC (ref 22–37)
BASE EXCESS BLDA CALC-SCNC: 5.1 MMOL/L
BASE EXCESS BLDV CALC-SCNC: 2.2 MMOL/L
BASE EXCESS BLDV CALC-SCNC: 5.4 MMOL/L
BASOPHILS # BLD AUTO: 0.1 10E9/L (ref 0–0.2)
BASOPHILS NFR BLD AUTO: 0.2 %
BILIRUB UR QL STRIP: NEGATIVE
BUN SERPL-MCNC: 6 MG/DL (ref 7–30)
BUN SERPL-MCNC: 8 MG/DL (ref 7–30)
BUN SERPL-MCNC: 8 MG/DL (ref 7–30)
CALCIUM SERPL-MCNC: 7.2 MG/DL (ref 8.5–10.1)
CALCIUM SERPL-MCNC: 7.7 MG/DL (ref 8.5–10.1)
CALCIUM SERPL-MCNC: 8.1 MG/DL (ref 8.5–10.1)
CHLORIDE SERPL-SCNC: 104 MMOL/L (ref 94–109)
CHLORIDE SERPL-SCNC: 105 MMOL/L (ref 94–109)
CHLORIDE SERPL-SCNC: 109 MMOL/L (ref 94–109)
CO2 SERPL-SCNC: 28 MMOL/L (ref 20–32)
CO2 SERPL-SCNC: 31 MMOL/L (ref 20–32)
CO2 SERPL-SCNC: 33 MMOL/L (ref 20–32)
COLOR UR AUTO: ABNORMAL
CREAT SERPL-MCNC: 0.58 MG/DL (ref 0.52–1.04)
CREAT SERPL-MCNC: 0.63 MG/DL (ref 0.52–1.04)
CREAT SERPL-MCNC: 0.66 MG/DL (ref 0.52–1.04)
DEPRECATED CALCIDIOL+CALCIFEROL SERPL-MC: 23 UG/L (ref 20–75)
DIFFERENTIAL METHOD BLD: ABNORMAL
EOSINOPHIL # BLD AUTO: 0.6 10E9/L (ref 0–0.7)
EOSINOPHIL NFR BLD AUTO: 2.7 %
ERYTHROCYTE [DISTWIDTH] IN BLOOD BY AUTOMATED COUNT: 13.7 % (ref 10–15)
ERYTHROCYTE [DISTWIDTH] IN BLOOD BY AUTOMATED COUNT: 13.8 % (ref 10–15)
ERYTHROCYTE [DISTWIDTH] IN BLOOD BY AUTOMATED COUNT: 13.8 % (ref 10–15)
GFR SERPL CREATININE-BSD FRML MDRD: >90 ML/MIN/{1.73_M2}
GLUCOSE BLDC GLUCOMTR-MCNC: 103 MG/DL (ref 70–99)
GLUCOSE BLDC GLUCOMTR-MCNC: 106 MG/DL (ref 70–99)
GLUCOSE BLDC GLUCOMTR-MCNC: 107 MG/DL (ref 70–99)
GLUCOSE BLDC GLUCOMTR-MCNC: 119 MG/DL (ref 70–99)
GLUCOSE BLDC GLUCOMTR-MCNC: 122 MG/DL (ref 70–99)
GLUCOSE BLDC GLUCOMTR-MCNC: 125 MG/DL (ref 70–99)
GLUCOSE BLDC GLUCOMTR-MCNC: 137 MG/DL (ref 70–99)
GLUCOSE BLDC GLUCOMTR-MCNC: 145 MG/DL (ref 70–99)
GLUCOSE BLDC GLUCOMTR-MCNC: 147 MG/DL (ref 70–99)
GLUCOSE BLDC GLUCOMTR-MCNC: 148 MG/DL (ref 70–99)
GLUCOSE BLDC GLUCOMTR-MCNC: 152 MG/DL (ref 70–99)
GLUCOSE BLDC GLUCOMTR-MCNC: 162 MG/DL (ref 70–99)
GLUCOSE BLDC GLUCOMTR-MCNC: 167 MG/DL (ref 70–99)
GLUCOSE BLDC GLUCOMTR-MCNC: 226 MG/DL (ref 70–99)
GLUCOSE BLDC GLUCOMTR-MCNC: 257 MG/DL (ref 70–99)
GLUCOSE BLDC GLUCOMTR-MCNC: 58 MG/DL (ref 70–99)
GLUCOSE BLDC GLUCOMTR-MCNC: 68 MG/DL (ref 70–99)
GLUCOSE BLDC GLUCOMTR-MCNC: 87 MG/DL (ref 70–99)
GLUCOSE BLDC GLUCOMTR-MCNC: 95 MG/DL (ref 70–99)
GLUCOSE SERPL-MCNC: 154 MG/DL (ref 70–99)
GLUCOSE SERPL-MCNC: 186 MG/DL (ref 70–99)
GLUCOSE SERPL-MCNC: 248 MG/DL (ref 70–99)
GLUCOSE UR STRIP-MCNC: NEGATIVE MG/DL
HCO3 BLD-SCNC: 31 MMOL/L (ref 21–28)
HCO3 BLDV-SCNC: 30 MMOL/L (ref 21–28)
HCO3 BLDV-SCNC: 32 MMOL/L (ref 21–28)
HCT VFR BLD AUTO: 20 % (ref 35–47)
HCT VFR BLD AUTO: 22.6 % (ref 35–47)
HCT VFR BLD AUTO: 24.7 % (ref 35–47)
HGB BLD-MCNC: 6.1 G/DL (ref 11.7–15.7)
HGB BLD-MCNC: 7.1 G/DL (ref 11.7–15.7)
HGB BLD-MCNC: 7.7 G/DL (ref 11.7–15.7)
HGB UR QL STRIP: NEGATIVE
IMM GRANULOCYTES # BLD: 0.1 10E9/L (ref 0–0.4)
IMM GRANULOCYTES NFR BLD: 0.6 %
KETONES UR STRIP-MCNC: NEGATIVE MG/DL
LACTATE BLD-SCNC: 1.4 MMOL/L (ref 0.7–2)
LEUKOCYTE ESTERASE UR QL STRIP: NEGATIVE
LYMPHOCYTES # BLD AUTO: 1.7 10E9/L (ref 0.8–5.3)
LYMPHOCYTES NFR BLD AUTO: 8.5 %
MAGNESIUM SERPL-MCNC: 1.8 MG/DL (ref 1.6–2.3)
MAGNESIUM SERPL-MCNC: 1.9 MG/DL (ref 1.6–2.3)
MAGNESIUM SERPL-MCNC: 2.1 MG/DL (ref 1.6–2.3)
MCH RBC QN AUTO: 27.7 PG (ref 26.5–33)
MCH RBC QN AUTO: 28.3 PG (ref 26.5–33)
MCH RBC QN AUTO: 28.7 PG (ref 26.5–33)
MCHC RBC AUTO-ENTMCNC: 30.5 G/DL (ref 31.5–36.5)
MCHC RBC AUTO-ENTMCNC: 31.2 G/DL (ref 31.5–36.5)
MCHC RBC AUTO-ENTMCNC: 31.4 G/DL (ref 31.5–36.5)
MCV RBC AUTO: 91 FL (ref 78–100)
MCV RBC AUTO: 91 FL (ref 78–100)
MCV RBC AUTO: 92 FL (ref 78–100)
MONOCYTES # BLD AUTO: 1.4 10E9/L (ref 0–1.3)
MONOCYTES NFR BLD AUTO: 7 %
MUCOUS THREADS #/AREA URNS LPF: PRESENT /LPF
NEUTROPHILS # BLD AUTO: 16.3 10E9/L (ref 1.6–8.3)
NEUTROPHILS NFR BLD AUTO: 81 %
NITRATE UR QL: NEGATIVE
NRBC # BLD AUTO: 0 10*3/UL
NRBC BLD AUTO-RTO: 0 /100
O2/TOTAL GAS SETTING VFR VENT: 100 %
O2/TOTAL GAS SETTING VFR VENT: ABNORMAL %
O2/TOTAL GAS SETTING VFR VENT: ABNORMAL %
OXYHGB MFR BLDV: 43 %
PCO2 BLD: 52 MM HG (ref 35–45)
PCO2 BLDV: 46 MM HG (ref 40–50)
PCO2 BLDV: 98 MM HG (ref 40–50)
PH BLD: 7.38 PH (ref 7.35–7.45)
PH BLDV: 7.13 PH (ref 7.32–7.43)
PH BLDV: 7.43 PH (ref 7.32–7.43)
PH UR STRIP: 5.5 PH (ref 5–7)
PHOSPHATE SERPL-MCNC: 1.5 MG/DL (ref 2.5–4.5)
PHOSPHATE SERPL-MCNC: 2.7 MG/DL (ref 2.5–4.5)
PHOSPHATE SERPL-MCNC: 3 MG/DL (ref 2.5–4.5)
PLATELET # BLD AUTO: 222 10E9/L (ref 150–450)
PLATELET # BLD AUTO: 252 10E9/L (ref 150–450)
PLATELET # BLD AUTO: 269 10E9/L (ref 150–450)
PO2 BLD: 64 MM HG (ref 80–105)
PO2 BLDV: 31 MM HG (ref 25–47)
PO2 BLDV: 42 MM HG (ref 25–47)
POTASSIUM SERPL-SCNC: 3.7 MMOL/L (ref 3.4–5.3)
POTASSIUM SERPL-SCNC: 3.8 MMOL/L (ref 3.4–5.3)
POTASSIUM SERPL-SCNC: 4.6 MMOL/L (ref 3.4–5.3)
RBC # BLD AUTO: 2.2 10E12/L (ref 3.8–5.2)
RBC # BLD AUTO: 2.47 10E12/L (ref 3.8–5.2)
RBC # BLD AUTO: 2.72 10E12/L (ref 3.8–5.2)
RBC #/AREA URNS AUTO: 3 /HPF (ref 0–2)
SODIUM SERPL-SCNC: 138 MMOL/L (ref 133–144)
SODIUM SERPL-SCNC: 139 MMOL/L (ref 133–144)
SODIUM SERPL-SCNC: 142 MMOL/L (ref 133–144)
SOURCE: ABNORMAL
SP GR UR STRIP: 1.02 (ref 1–1.03)
TROPONIN I SERPL-MCNC: 0.03 UG/L (ref 0–0.04)
UROBILINOGEN UR STRIP-MCNC: NORMAL MG/DL (ref 0–2)
WBC # BLD AUTO: 14.3 10E9/L (ref 4–11)
WBC # BLD AUTO: 20.2 10E9/L (ref 4–11)
WBC # BLD AUTO: 24.1 10E9/L (ref 4–11)
WBC #/AREA URNS AUTO: 3 /HPF (ref 0–5)

## 2020-07-29 PROCEDURE — 84100 ASSAY OF PHOSPHORUS: CPT | Performed by: TRANSPLANT SURGERY

## 2020-07-29 PROCEDURE — 80048 BASIC METABOLIC PNL TOTAL CA: CPT | Performed by: STUDENT IN AN ORGANIZED HEALTH CARE EDUCATION/TRAINING PROGRAM

## 2020-07-29 PROCEDURE — 82803 BLOOD GASES ANY COMBINATION: CPT | Performed by: STUDENT IN AN ORGANIZED HEALTH CARE EDUCATION/TRAINING PROGRAM

## 2020-07-29 PROCEDURE — 25000125 ZZHC RX 250: Performed by: SURGERY

## 2020-07-29 PROCEDURE — 25000125 ZZHC RX 250

## 2020-07-29 PROCEDURE — 25000132 ZZH RX MED GY IP 250 OP 250 PS 637: Performed by: SURGERY

## 2020-07-29 PROCEDURE — 93005 ELECTROCARDIOGRAM TRACING: CPT

## 2020-07-29 PROCEDURE — 25000128 H RX IP 250 OP 636: Performed by: PHYSICIAN ASSISTANT

## 2020-07-29 PROCEDURE — 25000132 ZZH RX MED GY IP 250 OP 250 PS 637

## 2020-07-29 PROCEDURE — 36415 COLL VENOUS BLD VENIPUNCTURE: CPT

## 2020-07-29 PROCEDURE — 25000125 ZZHC RX 250: Performed by: TRANSPLANT SURGERY

## 2020-07-29 PROCEDURE — 80048 BASIC METABOLIC PNL TOTAL CA: CPT | Performed by: TRANSPLANT SURGERY

## 2020-07-29 PROCEDURE — 85025 COMPLETE CBC W/AUTO DIFF WBC: CPT | Performed by: TRANSPLANT SURGERY

## 2020-07-29 PROCEDURE — 93010 ELECTROCARDIOGRAM REPORT: CPT | Performed by: INTERNAL MEDICINE

## 2020-07-29 PROCEDURE — 25000125 ZZHC RX 250: Performed by: PHYSICIAN ASSISTANT

## 2020-07-29 PROCEDURE — 83735 ASSAY OF MAGNESIUM: CPT | Performed by: STUDENT IN AN ORGANIZED HEALTH CARE EDUCATION/TRAINING PROGRAM

## 2020-07-29 PROCEDURE — 25000128 H RX IP 250 OP 636

## 2020-07-29 PROCEDURE — P9016 RBC LEUKOCYTES REDUCED: HCPCS | Performed by: ANESTHESIOLOGY

## 2020-07-29 PROCEDURE — 27210436 ZZH NUTRITION PRODUCT SEMIELEM INTERMED CAN

## 2020-07-29 PROCEDURE — 97110 THERAPEUTIC EXERCISES: CPT | Mod: GP

## 2020-07-29 PROCEDURE — 71045 X-RAY EXAM CHEST 1 VIEW: CPT

## 2020-07-29 PROCEDURE — 25000125 ZZHC RX 250: Performed by: NURSE PRACTITIONER

## 2020-07-29 PROCEDURE — 40000275 ZZH STATISTIC RCP TIME EA 10 MIN

## 2020-07-29 PROCEDURE — 20000004 ZZH R&B ICU UMMC

## 2020-07-29 PROCEDURE — 5A1935Z RESPIRATORY VENTILATION, LESS THAN 24 CONSECUTIVE HOURS: ICD-10-PCS | Performed by: SURGERY

## 2020-07-29 PROCEDURE — 25000128 H RX IP 250 OP 636: Performed by: SURGERY

## 2020-07-29 PROCEDURE — 25800030 ZZH RX IP 258 OP 636: Performed by: PHYSICIAN ASSISTANT

## 2020-07-29 PROCEDURE — 85027 COMPLETE CBC AUTOMATED: CPT | Performed by: STUDENT IN AN ORGANIZED HEALTH CARE EDUCATION/TRAINING PROGRAM

## 2020-07-29 PROCEDURE — 25000128 H RX IP 250 OP 636: Performed by: TRANSPLANT SURGERY

## 2020-07-29 PROCEDURE — 40000556 ZZH STATISTIC PERIPHERAL IV START W US GUIDANCE

## 2020-07-29 PROCEDURE — 25000132 ZZH RX MED GY IP 250 OP 250 PS 637: Performed by: PHYSICIAN ASSISTANT

## 2020-07-29 PROCEDURE — 84100 ASSAY OF PHOSPHORUS: CPT | Performed by: STUDENT IN AN ORGANIZED HEALTH CARE EDUCATION/TRAINING PROGRAM

## 2020-07-29 PROCEDURE — 25800030 ZZH RX IP 258 OP 636: Performed by: TRANSPLANT SURGERY

## 2020-07-29 PROCEDURE — 25800030 ZZH RX IP 258 OP 636

## 2020-07-29 PROCEDURE — 25800030 ZZH RX IP 258 OP 636: Performed by: STUDENT IN AN ORGANIZED HEALTH CARE EDUCATION/TRAINING PROGRAM

## 2020-07-29 PROCEDURE — 97530 THERAPEUTIC ACTIVITIES: CPT | Mod: GP

## 2020-07-29 PROCEDURE — 99231 SBSQ HOSP IP/OBS SF/LOW 25: CPT | Mod: GC | Performed by: SURGERY

## 2020-07-29 PROCEDURE — 84484 ASSAY OF TROPONIN QUANT: CPT | Performed by: STUDENT IN AN ORGANIZED HEALTH CARE EDUCATION/TRAINING PROGRAM

## 2020-07-29 PROCEDURE — 83735 ASSAY OF MAGNESIUM: CPT | Performed by: TRANSPLANT SURGERY

## 2020-07-29 PROCEDURE — 87040 BLOOD CULTURE FOR BACTERIA: CPT | Performed by: STUDENT IN AN ORGANIZED HEALTH CARE EDUCATION/TRAINING PROGRAM

## 2020-07-29 PROCEDURE — 85730 THROMBOPLASTIN TIME PARTIAL: CPT | Performed by: TRANSPLANT SURGERY

## 2020-07-29 PROCEDURE — 27110038 ZZH RX 271

## 2020-07-29 PROCEDURE — 83605 ASSAY OF LACTIC ACID: CPT | Performed by: STUDENT IN AN ORGANIZED HEALTH CARE EDUCATION/TRAINING PROGRAM

## 2020-07-29 PROCEDURE — 81001 URINALYSIS AUTO W/SCOPE: CPT | Performed by: STUDENT IN AN ORGANIZED HEALTH CARE EDUCATION/TRAINING PROGRAM

## 2020-07-29 PROCEDURE — 94002 VENT MGMT INPAT INIT DAY: CPT

## 2020-07-29 PROCEDURE — 82306 VITAMIN D 25 HYDROXY: CPT | Performed by: TRANSPLANT SURGERY

## 2020-07-29 PROCEDURE — 00000146 ZZHCL STATISTIC GLUCOSE BY METER IP

## 2020-07-29 PROCEDURE — 25000125 ZZHC RX 250: Performed by: STUDENT IN AN ORGANIZED HEALTH CARE EDUCATION/TRAINING PROGRAM

## 2020-07-29 RX ORDER — BUPIVACAINE HYDROCHLORIDE 5 MG/ML
5 INJECTION, SOLUTION EPIDURAL; INTRACAUDAL ONCE
Status: COMPLETED | OUTPATIENT
Start: 2020-07-29 | End: 2020-07-29

## 2020-07-29 RX ORDER — KETAMINE HYDROCHLORIDE 10 MG/ML
2.5-5 INJECTION, SOLUTION INTRAMUSCULAR; INTRAVENOUS EVERY 6 HOURS
Status: DISCONTINUED | OUTPATIENT
Start: 2020-07-29 | End: 2020-07-30

## 2020-07-29 RX ORDER — SODIUM CHLORIDE, SODIUM LACTATE, POTASSIUM CHLORIDE, CALCIUM CHLORIDE 600; 310; 30; 20 MG/100ML; MG/100ML; MG/100ML; MG/100ML
INJECTION, SOLUTION INTRAVENOUS
Status: DISCONTINUED
Start: 2020-07-29 | End: 2020-07-30 | Stop reason: HOSPADM

## 2020-07-29 RX ORDER — LORAZEPAM 2 MG/ML
0.25 INJECTION INTRAMUSCULAR EVERY 6 HOURS
Status: DISCONTINUED | OUTPATIENT
Start: 2020-07-29 | End: 2020-07-29

## 2020-07-29 RX ORDER — PIPERACILLIN SODIUM, TAZOBACTAM SODIUM 3; .375 G/15ML; G/15ML
3.38 INJECTION, POWDER, LYOPHILIZED, FOR SOLUTION INTRAVENOUS EVERY 6 HOURS
Status: DISCONTINUED | OUTPATIENT
Start: 2020-07-29 | End: 2020-08-04

## 2020-07-29 RX ORDER — MIDAZOLAM HYDROCHLORIDE 2 MG/ML
2 SYRUP ORAL ONCE
Status: DISCONTINUED | OUTPATIENT
Start: 2020-07-29 | End: 2020-07-29

## 2020-07-29 RX ORDER — DEXMEDETOMIDINE HYDROCHLORIDE 4 UG/ML
.1-.7 INJECTION, SOLUTION INTRAVENOUS CONTINUOUS
Status: DISCONTINUED | OUTPATIENT
Start: 2020-07-29 | End: 2020-07-29

## 2020-07-29 RX ORDER — DEXMEDETOMIDINE HYDROCHLORIDE 4 UG/ML
0.2-0.7 INJECTION, SOLUTION INTRAVENOUS CONTINUOUS
Status: DISCONTINUED | OUTPATIENT
Start: 2020-07-29 | End: 2020-07-31

## 2020-07-29 RX ADMIN — KETAMINE HYDROCHLORIDE 5 MG: 10 INJECTION INTRAMUSCULAR; INTRAVENOUS at 08:05

## 2020-07-29 RX ADMIN — PIPERACILLIN AND TAZOBACTAM 3.38 G: 3; .375 INJECTION, POWDER, FOR SOLUTION INTRAVENOUS at 20:08

## 2020-07-29 RX ADMIN — MULTIVITAMIN 15 ML: LIQUID ORAL at 08:05

## 2020-07-29 RX ADMIN — KETAMINE HYDROCHLORIDE 5 MG: 10 INJECTION INTRAMUSCULAR; INTRAVENOUS at 02:01

## 2020-07-29 RX ADMIN — DEXMEDETOMIDINE 0.1 MCG/KG/HR: 100 INJECTION, SOLUTION, CONCENTRATE INTRAVENOUS at 10:26

## 2020-07-29 RX ADMIN — KETAMINE HYDROCHLORIDE 5 MG: 10 INJECTION INTRAMUSCULAR; INTRAVENOUS at 14:17

## 2020-07-29 RX ADMIN — MIDAZOLAM HYDROCHLORIDE 2 MG: 1 INJECTION, SOLUTION INTRAMUSCULAR; INTRAVENOUS at 23:10

## 2020-07-29 RX ADMIN — SODIUM PHOSPHATE, MONOBASIC, MONOHYDRATE AND SODIUM PHOSPHATE, DIBASIC, ANHYDROUS 20 MMOL: 276; 142 INJECTION, SOLUTION INTRAVENOUS at 08:18

## 2020-07-29 RX ADMIN — Medication: at 12:49

## 2020-07-29 RX ADMIN — SODIUM CHLORIDE, POTASSIUM CHLORIDE, SODIUM LACTATE AND CALCIUM CHLORIDE 500 ML: 600; 310; 30; 20 INJECTION, SOLUTION INTRAVENOUS at 22:31

## 2020-07-29 RX ADMIN — BUPIVACAINE HYDROCHLORIDE 25 MG: 5 INJECTION, SOLUTION EPIDURAL; INTRACAUDAL at 08:27

## 2020-07-29 RX ADMIN — KETAMINE HYDROCHLORIDE 5 MG: 10 INJECTION INTRAMUSCULAR; INTRAVENOUS at 20:08

## 2020-07-29 RX ADMIN — ONDANSETRON 4 MG: 2 INJECTION INTRAMUSCULAR; INTRAVENOUS at 14:33

## 2020-07-29 RX ADMIN — GABAPENTIN 300 MG: 250 SUSPENSION ORAL at 08:05

## 2020-07-29 RX ADMIN — SODIUM CHLORIDE, SODIUM LACTATE, POTASSIUM CHLORIDE, CALCIUM CHLORIDE AND DEXTROSE MONOHYDRATE: 5; 600; 310; 30; 20 INJECTION, SOLUTION INTRAVENOUS at 05:54

## 2020-07-29 RX ADMIN — VANCOMYCIN HYDROCHLORIDE 1500 MG: 10 INJECTION, POWDER, LYOPHILIZED, FOR SOLUTION INTRAVENOUS at 23:44

## 2020-07-29 RX ADMIN — DEXMEDETOMIDINE HYDROCHLORIDE 0.4 MCG/KG/HR: 4 INJECTION, SOLUTION INTRAVENOUS at 22:29

## 2020-07-29 RX ADMIN — PIPERACILLIN AND TAZOBACTAM 3.38 G: 3; .375 INJECTION, POWDER, FOR SOLUTION INTRAVENOUS at 00:51

## 2020-07-29 RX ADMIN — HUMAN INSULIN 6 UNITS/HR: 100 INJECTION, SOLUTION SUBCUTANEOUS at 23:02

## 2020-07-29 RX ADMIN — Medication: at 17:48

## 2020-07-29 RX ADMIN — SODIUM CHLORIDE, SODIUM LACTATE, POTASSIUM CHLORIDE, CALCIUM CHLORIDE AND DEXTROSE MONOHYDRATE: 5; 600; 310; 30; 20 INJECTION, SOLUTION INTRAVENOUS at 17:47

## 2020-07-29 RX ADMIN — PIPERACILLIN AND TAZOBACTAM 3.38 G: 3; .375 INJECTION, POWDER, FOR SOLUTION INTRAVENOUS at 08:04

## 2020-07-29 RX ADMIN — GABAPENTIN 300 MG: 250 SUSPENSION ORAL at 20:10

## 2020-07-29 RX ADMIN — Medication 40 MG: at 08:06

## 2020-07-29 RX ADMIN — LORAZEPAM 0.5 MG: 2 INJECTION INTRAMUSCULAR; INTRAVENOUS at 04:48

## 2020-07-29 RX ADMIN — PROCHLORPERAZINE EDISYLATE 10 MG: 5 INJECTION INTRAMUSCULAR; INTRAVENOUS at 15:45

## 2020-07-29 RX ADMIN — FLUCONAZOLE IN SODIUM CHLORIDE 400 MG: 2 INJECTION, SOLUTION INTRAVENOUS at 04:46

## 2020-07-29 RX ADMIN — MAGNESIUM SULFATE IN WATER 2 G: 40 INJECTION, SOLUTION INTRAVENOUS at 06:52

## 2020-07-29 RX ADMIN — PIPERACILLIN AND TAZOBACTAM 3.38 G: 3; .375 INJECTION, POWDER, FOR SOLUTION INTRAVENOUS at 14:17

## 2020-07-29 ASSESSMENT — ACTIVITIES OF DAILY LIVING (ADL)
ADLS_ACUITY_SCORE: 15

## 2020-07-29 ASSESSMENT — MIFFLIN-ST. JEOR: SCORE: 1307.88

## 2020-07-29 ASSESSMENT — PAIN DESCRIPTION - DESCRIPTORS: DESCRIPTORS: SHARP;ACHING

## 2020-07-29 NOTE — PROGRESS NOTES
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 Jarvis 7/27/2020.   She received 1116 islet equivalents per kg body weight.       -on D5LR at 125 ml/hour    total pancreatectomy with auto-islet cell transplant : A1C 6% on (7/23/2020) indicating pre-diabetes most likely 2/2 chronic pancreatitis     Plan  - continue on IV insulin, will transition once tub feeds are at goal, dextrose fluids are discontinued and tolerating the tube feeds  -D5LR at 125 ml/hour  -goal glucose   -hypoglycemia protocol  -will monitor peripherally until patient is on 7A                          Outpatient diabetes follow up: TBD  .           Interval History:   The last 24 hours progress and nursing notes reviewed.  Review of chart only  Continues on IV insulin while the tube feeds are advancing.  Started on tube feeds on 7/28 at ~ 1700  IV rates 1-5.5 units/hour  Glucose has not always been in goa range    Nutrition:   Impact peptide 1.5 via jejunostomy to start at 10 ml/hour, goal 50 ml/hour  - D5LR at 125 ml/hour    Recent Labs   Lab 07/29/20  0657 07/29/20  0558 07/29/20  0454 07/29/20  0408 07/29/20  0406 07/29/20  0300 07/29/20  0204  07/28/20  0354  07/27/20  1915  07/27/20  1708  07/27/20  1503  07/27/20  1400   GLC  --   --   --  154*  --   --   --   --  107*  --  100*  --  95  --  134*  --  115*   * 103* 125*  --  148* 162* 137*   < >  --    < >  --    < >  --    < >  --    < >  --     < > = values in this interval not displayed.               Review of Systems:   See interval hx          Medications:     Orders Placed This Encounter      NPO for Medical/Clinical Reasons Except for: No Exceptions      /73   Pulse 118   Temp 97.6  F (36.4  C) (Oral)   Resp 20   Ht 1.651 m (5' 5\")   Wt 70.2 kg (154 lb 12.2 oz)  "  SpO2 92%   BMI 25.75 kg/m             Data:     Lab Results   Component Value Date    A1C 6.0 07/23/2020              CBC RESULTS:   Recent Labs   Lab Test 07/29/20 0408   WBC 20.2*   RBC 2.72*   HGB 7.7*   HCT 24.7*   MCV 91   MCH 28.3   MCHC 31.2*   RDW 13.8        Recent Labs   Lab Test 07/29/20  0408 07/28/20  2204  07/28/20  0354     --   --  143   POTASSIUM 4.6 3.9   < > 3.3*   CHLORIDE 109  --   --  112*   CO2 28  --   --  27   ANIONGAP 5  --   --  4   *  --   --  107*   BUN 6*  --   --  7   CR 0.63  --   --  0.60   MONICA 8.1*  --   --  7.7*    < > = values in this interval not displayed.     Liver Function Studies -   Recent Labs   Lab Test 07/28/20 0354   PROTTOTAL 4.3*   ALBUMIN 2.7*   BILITOTAL 0.3   ALKPHOS 39*   AST 59*   ALT 52*     Lab Results   Component Value Date    INR 1.68 07/27/2020    INR 1.06 07/23/2020             Tonydewaynecarolee Bolden [phone]  Diabetes Management job code [id number]

## 2020-07-29 NOTE — PROGRESS NOTES
[facility]IONAL ANESTHESIA PAIN SERVICE CONTINUOUS NERVE INFUSION NOTE  Dottie Hernandez is a 54 year old female PMHx of migraines, ANIBAL, and cchronic pancreatitis 2/2 pancreatic divisum who is POD # 1 s/p laparoscopic TPIAT,  Splenectomy, gastrojejunostomy placement and placement of bilateral T7-8 paravertebral (PV) catheters (7/27/2020) for post operative pain control.    Subjective and Interval History: Overnight events: apneic episode after Dilaudid PCA dose and lorazepam administration-improved now.  Patient seen at 0845.  Received bolus at 0850. Reports moderate pain control with nerve block continuous infusion and current analgesics (see below: PCA Dilaudid, Precedex gtt).  Denies weakness, paresthesias, circumoral numbness, metallic taste or tinnitus. Patient is bed resting, not OOB yet.  NPO at this time, denies nausea.    Pain Intensity using Numerical Rating Scale (NRS): 5/10 at rest.  8/10 with activity      Antithrombotic/Thrombolytic Therapy ordered:  heparin  infusion 200 Units/hr, IV, Continuous       Analgesic Medications:   Medications related to Pain Management (From now, onward)    Start     Dose/Rate Route Frequency Ordered Stop    07/28/20 0800  gabapentin (NEURONTIN) solution 300 mg      300 mg Oral or J tube 2 TIMES DAILY 07/27/20 2200 07/28/20 0100  ketamine (KETALAR) injection 5-10 mg      5-10 mg  over 2-5 Minutes Intravenous EVERY 6 HOURS 07/27/20 2225 07/27/20 2216  oxyCODONE (ROXICODONE) solution 5 mg      5 mg Oral EVERY 4 HOURS PRN 07/27/20 2217 07/27/20 2200  lidocaine 1 % 0.1-1 mL      0.1-1 mL Other EVERY 1 HOUR PRN 07/27/20 2200 07/27/20 2200  lidocaine (LMX4) cream       Topical EVERY 1 HOUR PRN 07/27/20 2200      07/27/20 2200  hydrOXYzine (ATARAX) syrup 25 mg      25 mg Oral EVERY 6 HOURS PRN 07/27/20 2200 07/27/20 2200  LORazepam (ATIVAN) injection 0.5 mg      0.5 mg Intravenous EVERY 6 HOURS 07/27/20 1859      07/27/20 1915  HYDROmorphone (DILAUDID) PCA 0.2  mg/mL OPIOID TOLERANT       Intravenous CONTINUOUS 07/27/20 1900      07/27/20 1900  dexmedetomidine (PRECEDEX) 400 mcg in 0.9% sodium chloride 100 mL      0.2-0.7 mcg/kg/hr × 63.1 kg  3.2-11 mL/hr  Intravenous CONTINUOUS 07/27/20 1859      07/27/20 0930  ropivacaine 0.2% (NAROPIN) 750 mL in ON-Q C-Bloc select flow ([id number] holds 600-750 mL) dual cath disposable pump      14 mL/hr  Irrigation Continuous Nerve Block 07/27/20 0928      07/27/20 0700  SUFentanil (SUFENTA) 100 mcg in sodium chloride 0.9 % 50 mL infusion      0.5-1 mcg/kg/hr × 63.1 kg  15.8-31.6 mL/hr  Intravenous CONTINUOUS 07/27/20 0653      07/27/20 0543  lidocaine 1 % 0.1-1 mL      0.1-1 mL Other EVERY 1 HOUR PRN 07/27/20 0543      07/27/20 0543  lidocaine (LMX4) cream       Topical EVERY 1 HOUR PRN 07/27/20 0543             Objective:  Lab results  Recent Labs   Lab Test 07/28/20  0354   WBC 14.8*   RBC 2.83*   HGB 8.1*   HCT 24.9*   MCV 88   MCH 28.6   MCHC 32.5   RDW 13.2          Lab Results   Component Value Date    INR 1.68 07/27/2020    INR 1.06 07/23/2020       Vitals:    Temp:  [36.3  C (97.4  F)-37.2  C (99  F)] 37.2  C (99  F)  Pulse:  [] 92  Heart Rate:  [] 123  Resp:  [6-28] 15  BP: ()/(51-95) 90/53  MAP:  [60 mmHg-105 mmHg] 97 mmHg  Arterial Line BP: ()/(46-91) 125/85  SpO2:  [90 %-100 %] 96 %    Exam:   GEN: alert and no distress  NEURO/MSK: Moving UE and LE spontaneously.  Strength LE 5/5  and overall symmetric.  SKIN: bilateral paravertebral (PV) catheter sites with dressing c/d/i, no tenderness, erythema, heme, edema      Assessment and Plan:     Patient is receiving moderate analgesia with current multimodal therapy including bilateral T7-8 paravertebral (PV) catheters with infusion of Ropivacaine 0.2% at 14 mL/hr, 7 mL/hr each catheter.  Motor function intact and is meeting activity goals.  No evidence of adverse side effects related to local anesthetic.         - patient can be evaluated to receive  local anesthetic bolus Q 12 hr PRN pain not controlled with continuous infusion.  Bedside nurse must page RAPS to request bolus    - continue Ropivacaine 0.2% infusion rate at 14 mL/hr, 7mL/hr each catheter, POD #1, CD#1  - antithrombotic/thrombolytic therapy: heparin  infusion 200 Units/hr, IV, Continuous  as ordered. Please contact RAPS (#[phone]) prior to any medication changes  - will continue to follow and adjust as needed      Leonel García MD  [facility] Pain [facility]  7/28/2020 8:05 AM    RAPS Contact Info (24 hour job code pager is the last 4 digits) For in-house use only:   Job code ID: [place] [id number]   [place] [id number]  Peds [phone]  [place] phone: dial * * * [phone], enter jobcode ID, then enter call-back number.    Text: Use [employer] on the Intranet <Paging/Directory> tab and enter Jobcode ID.   If no call back at any time, contact the hospital  and ask for RAPS attending or backup

## 2020-07-29 NOTE — PROGRESS NOTES
REGIONAL ANESTHESIA PAIN SERVICE CONTINUOUS NERVE INFUSION NOTE  Dottie Hernandez is a 55 year old female with history of chronic pancreatitis secondary to pancreatic divisum who is now POD #2 s/p PANCREATECTOMY, TOTAL, WITH AUTOLOGOUS PANCREATIC ISLET CELL TRANSPLANT, Splenectomy, gastrojejunostomy placement) and placement of bilateral T6-7 paravertebral (PV) catheters for postoperative pain control.    Subjective and Interval History: Pain usually between 5-8/10. Better controlled than last 2 days. Off ativan. Currently on dilaudid PCA and ketamine.     Vitals:    Temp:  [36.4  C (97.6  F)-38.7  C (101.6  F)] 37.4  C (99.4  F)  Pulse:  [112-147] 131  Heart Rate:  [112-144] 131  Resp:  [10-27] 14  BP: ()/(54-87) 121/75  MAP:  [94 mmHg-99 mmHg] 99 mmHg  Arterial Line BP: (106-125)/(74-85) 106/83  SpO2:  [70 %-100 %] 98 %    Exam:   GEN: drowsy but answers to questions appropriately, no distress and cooperative  NEURO/MSK: moves upper and lower extremites spontaneously, full strength and overall symmetric  SKIN: bilateral paravertebral (PV) catheter site with dressing c/d/i, no tenderness, erythema, heme, edema    Assessment and Plan:     POD #2 s/p PANCREATECTOMY, TOTAL, WITH AUTOLOGOUS PANCREATIC ISLET CELL TRANSPLANT, Splenectomy, gastrojejunostomy placement)  Patient is receiving adequate analgesia with current multimodal therapy including ROpivacaine 0.2% at 14 mL/hr, 7 mL/hr each catheter + Q 12 hr PRN bolus.  Motor function intact and adequate sensory block, is meeting activity goals.  No evidence of adverse side effects related to local anesthetic.     [id number] Cinician administered nerve block bolus  VSS   PF BUPivacaine 0.25%, total bolus 10 mL, 5 mL via right and left catheters, administered without complication, negative aspirate before and between each mL.  No symptoms of local anesthetic systemic toxicity (LAST). Remained with and assessed patient for 10 min post-injection. BP, P, and MAP stable.   Bedside RN aware of need to continue to monitoring and document BP, P, and MAP Q 10 min for an additional 20 min. Contact RAPS (jobcode ID [id number]) if any of the following: patient experiencing any untoward effects, SBP < 90, P < 50 or > 120, MAP < 60    PLAN  - patient can be evaluated to receive local anesthetic bolus Q 12 hr PRN pain not controlled with continuous infusion.  Bedside RN must page RAPS to request bolus  - continue ROpivacaine 0.2% infusion rate at 14 mL/hr, 7 mL/hr each catheter    Zee Khan MD  6:52 PM    RAPS Contact Info (24 hour job code pager is the last 4 digits) For in-house use only:   Job code ID: [email or web address] [id number]   [place] 0599  Peds [phone]  [employer] phone: dial * * * [phone], enter jobcode ID, then enter call-back number.    Text: Use [employer] on the Intranet <Paging/Directory> tab and enter Jobcode ID.   If no call back at any time, contact the hospital  and ask for RAPS attending or backup

## 2020-07-29 NOTE — PROGRESS NOTES
SURGICAL ICU PROGRESS NOTE  07/29/2020      PRIMARY TEAM: Transplant  PRIMARY PHYSICIAN: Matheus     REASON FOR CRITICAL CARE ADMISSION: LAP TP-IAT  ADMITTING PHYSICIAN: Perlman      ASSESSMENT: Dottie Hernandez is a 53yo F with a past medical history of season allergies, migraines, ANIBAL, and chronic pancreatitis 2/2 pancreatic divisim who undewent laparoscopic TPIAT with Dr. Jarvis 7/27/2020     PLAN:  Neuro/ pain/ sedation:  # Agitation   - Monitor neurological status. Delirium preventions and precautions.   - Pain: dilaudid PCA with basal rate, jimbo ketamine, jimbo ativan, paravertebral catheters -> will hold AM ativan, restarting at 0.25, adjusting precedex to 0.1     - will decrease the basal rate            - Sedation plan: precedex gtt     Pulmonary care:   # Post operative ventilatory support  # Acute hypoxic respiratory support   - Supplemental oxygen to keep saturation above 92 %  - oxymask 3L - wean as tolerated  - Incentive spirometer every 15- 30 minutes when awake.     Cardiovascular:    - Monitor hemodynamic status.       Gastroenterology/Nutrition:  - NPO, ok for ice and meds   - G to gravity, J capped   - Protonix 40mg daily  - Zofran PRN   - tube feeds potentially starting today  - TF through J tube increased to 20ML  - Neurontin liquid 300mg BID     Renal/fluids/lytes:  - d5 lr @ 125  - Urine output is adequate at 0.833 mL/kr/hr  . Will continue to monitor intake and output.  - Armendariz in place - will discuss status with Transplant team     Endocrine:  #s/p TPIAT  - Insulin gtt/ D10 gtt  - Target range for BG , well controlled   - Endocrine following      ID:  # Leukocytosis   - WBC elevated to 20.2, Tmax of 102.2 this AM  - Pending U/A, CXR, blood cultures, will follow-up  - panc islet culture - gram negative rods  - zosyn x 5 days  - diflucan fungal ppx     Heme:     # Acute blood loss anemia   - Hemoglobin stable at 7.7 this AM   - Transfuse if hgb <7.0 or signs/symptoms of hypoperfusion.  Monitor and trend.      Musculoskeletal:  # Weakness and deconditioning of critical illness   - Physical and occupational therapy consults, limited by pain     Skin:  Flexitrac to tubes      General Cares/Prophylaxis:    DVT Prophylaxis: Pneumatic Compression Devices and hep gtt 400 today  GI Prophylaxis: PPI  Restraints: Restraints for medical healing needed: NO     Lines/ tubes/ drains:  - PIV, CVC, zaragoza, GJ, WILMA   - Removed A line     Disposition:  - Surgical ICU.      Patient seen, findings and plan discussed with surgical ICU staff, Dr. Minerva Garcia MD  [facility], PGY1    ====================================    TODAY'S SUBJECTIVE/INTERVAL HISTORY:   No acute events overnight. Replaced again. Disoriented at times, although slept comfortably since 4AM.    OBJECTIVE:     Temp:  [97.5  F (36.4  C)-101.6  F (38.7  C)] 101.6  F (38.7  C)  Pulse:  [114-147] 118  Heart Rate:  [] 114  Resp:  [10-27] 17  BP: ()/(53-87) 118/65  MAP:  [78 mmHg-105 mmHg] 99 mmHg  Arterial Line BP: (101-134)/(55-85) 106/83  SpO2:  [70 %-100 %] 96 %  Resp: 23    I/O last 3 completed shifts:  In: 4650.02 [I.V.:4280.02; NG/GT:210]  Out: 1726 [Urine:1629; Emesis/NG output:17; Drains:80]    General/Neuro: Asleep at bedside, aroudable  Pulm: On NRB  Abd: soft; NT, ND  Extremities: no edema  Incision site: C/d/I.  Skin: warm and well-perfused.     LABS:   Arterial Blood Gases   Recent Labs   Lab 07/27/20  1708 07/27/20  1503 07/27/20  1400 07/27/20  1302   PH 7.39 7.37 7.40 7.42   PCO2 37 40 39 38   PO2 171* 143* 164* 221*   HCO3 23 23 24 25     Complete Blood Count   Recent Labs   Lab 07/29/20  0408 07/28/20 2013 07/28/20  1248 07/28/20  0354 07/28/20  0015 07/27/20 1915   WBC 20.2*  --   --  14.8* 13.5* 10.9   HGB 7.7* 7.2* 7.1* 8.1* 8.0* 8.8*     --   --  177 147* 149*     Basic Metabolic Panel  Recent Labs   Lab 07/29/20  0408 07/28/20 2204 07/28/20  1609 07/28/20  0354 07/27/20 1915  07/27/20  1708  07/23/20  0835     --   --  143 142 141   < > 140   POTASSIUM 4.6 3.9 3.1* 3.3* 4.2 3.7   < > 3.6   CHLORIDE 109  --   --  112* 112*  --   --  107   CO2 28  --   --  27 26  --   --  28   BUN 6*  --   --  7 7  --   --  10   CR 0.63  --   --  0.60 0.56  --   --  0.62   *  --   --  107* 100* 95   < > 106*    < > = values in this interval not displayed.     Liver Function Tests  Recent Labs   Lab 07/28/20  0354 07/27/20 1915 07/23/20  0835   AST 59* 89* 17   ALT 52* 57* 36   ALKPHOS 39* 39* 108   BILITOTAL 0.3 0.6 0.4   ALBUMIN 2.7* 3.0* 4.5   INR  --  1.68* 1.06     Pancreatic Enzymes  Recent Labs   Lab 07/28/20  0354   LIPASE 981*   AMYLASE 76     Coagulation Profile  Recent Labs   Lab 07/29/20  0408 07/28/20  0354 07/27/20  2223 07/27/20  1915 07/23/20  0835   INR  --   --   --  1.68* 1.06   PTT 31 34 31  --   --          IMAGING:   Recent Results (from the past 24 hour(s))   XR Chest Port 1 View    Narrative    EXAM: XR CHEST PORT 1 VW  7/29/2020 9:22 AM     HISTORY:  fUO       COMPARISON:  None    FINDINGS: Single view of the chest. Right central venous catheter with  tip near the cavoatrial junction. Trachea is midline. Heart size  appears normal. Left lung hazy opacifications may represent  atelectasis versus infection. Obscured hemidiaphragms bilaterally. No  pneumothorax. No pneumoperitoneum.      Impression    IMPRESSION:   1. Mild to moderate bilateral pleural effusions, left greater than  right.  2. Hazy opacifications of the left lung may represent atelectasis  versus infection. Recommend follow-up to clearing.    I have personally reviewed the examination and initial interpretation  and I agree with the findings.    ALVINO LAND MD

## 2020-07-29 NOTE — PROGRESS NOTES
Nutrition Progress Note - Discussion of POC on SICU rounds; f/u for progress towards previous nutrition POC (see previous 7/28 reassessment for details)     -Enteral access: J tube   -EN: Impact peptide @ 10   - Unable to obtain any diet hx, pt very sleepy and not really answering questions.  Yes/ no answers.      -Resp: Oxymask     MALNUTRITION  % Intake: Unable to assess  % Weight Loss: Weight loss does not meet criteria  Subcutaneous Fat Loss: Facial region:  mild  Muscle Loss: Temporal:  mild, Thoracic region (clavicle, acromium bone, deltoid, trapezius, pectoral):  mild, Upper arm (bicep, tricep):  mild and Lower arm  (forearm):  mild  Fluid Accumulation/Edema: Mild  Malnutrition Diagnosis: Non-severe malnutrition in the context of acute on chronic illness      Interventions:  Collaboration and Referral of Nutrition care - Discussed plan for FEN/GI on rounds with Providers who approved:     - Advance TF to 20 ml/hr w/ Reliazorb ( GOAL: 50 ml.hr)    Macy Ma, RD, MS, LD  [facility] [id number]

## 2020-07-29 NOTE — PROGRESS NOTES
Around 1830 pt became tachycardic -140 and O2 desat 60-80's; oxymask 2L increased to 4L. MARCO ANTONIOU Shannan and Benjamin informed. 12 Lead EKG done, labs ordered.

## 2020-07-29 NOTE — PLAN OF CARE
Neuro: Pt lethargic but oriented x4. Pt follows commands and moves all extremities. Precedex and Dilaudid (continuous and PCA) used for comfortable pain management.      Cardiac: ST. HR 120s. MAPs >65.     Respiratory: On 2L O2/NC when awake. On 2-4L Oxymask while asleep. Nasal trumpet placed at 0600 due to apnea with SpO2 70% and . Lung sounds clear/diminished.     GI/: Pt NPO. Jtube infusing with tube feeds. Gtube to gravity with 0-10ml/hr of brown output present. Urine output 40-75ml/hr. LBM 7/26. Insulin gtt titrated per algorithm.      Incisons: Abdominal incision dressing with scant amount of unchanged drainage present. RUQ WILMA with 0-5ml/hr of serosanguinous drainage present.      Lines: Left radial arterial line - waveform dampened, MD aware. R subclavian CVC x3. D5LR gtt and Heparin gtt infusing.      No family called RN for update.  Continue to monitor and update MD as needed. Continue plan of care.          Problem: Adult Inpatient Plan of Care  Goal: Plan of Care Review  7/29/2020 0641 by Iman Garzon, RN  Outcome: Improving

## 2020-07-29 NOTE — PROGRESS NOTES
REGIONAL ANESTHESIA PAIN SERVICE CONTINUOUS NERVE INFUSION NOTE  Dottie Hernandez is a 55 year old female with history of chronic pancreatitis secondary to pancreatic divisum who is now POD #2 s/p PANCREATECTOMY, TOTAL, WITH AUTOLOGOUS PANCREATIC ISLET CELL TRANSPLANT, Splenectomy, gastrojejunostomy placement) and placement of bilateral T6-7 paravertebral (PV) catheters for postoperative pain control.    Subjective and Interval History: Overnight no acute events. On Precedex drip.  Patient seen at 0830.  Arouses easily, states nerve block bolus helped last night and is requesting bolus this AM. Reports L) greater than R) sided abdominal pain rating at 8/10, adequate control  nerve block continuous infusion, Dilaudid PCA 0.2mg/hr with 0.2mg PCA doses Q 10 min lockout, scheduled gabapentin and ketamine IV (see below).  Denies LE weakness, paresthesias, circumoral numbness, metallic taste or tinnitus. NPO currently denies nausea.    Antithrombotic/Thrombolytic Therapy ordered:    heparin 25,000 units in 0.45% NaCl 250 mL ANTICOAGULANT  infusion 200 Units/hr, IV, Continuous - Increased to 400 units/hr today         Analgesic Medications:   Medications related to Pain Management (From now, onward)    Start     Dose/Rate Route Frequency Ordered Stop    07/28/20 0800  gabapentin (NEURONTIN) solution 300 mg      300 mg Oral or J tube 2 TIMES DAILY 07/27/20 2200 07/28/20 0100  ketamine (KETALAR) injection 5-10 mg      5-10 mg  over 2-5 Minutes Intravenous EVERY 6 HOURS 07/27/20 2225 07/27/20 2216  oxyCODONE (ROXICODONE) solution 5 mg      5 mg Oral EVERY 4 HOURS PRN 07/27/20 2217 07/27/20 2200  lidocaine 1 % 0.1-1 mL      0.1-1 mL Other EVERY 1 HOUR PRN 07/27/20 2200 07/27/20 2200  lidocaine (LMX4) cream       Topical EVERY 1 HOUR PRN 07/27/20 2200 07/27/20 2200  hydrOXYzine (ATARAX) syrup 25 mg      25 mg Oral EVERY 6 HOURS PRN 07/27/20 2200 07/27/20 2200  LORazepam (ATIVAN) injection 0.5 mg       0.5 mg Intravenous EVERY 6 HOURS 07/27/20 1859 07/27/20 1915  HYDROmorphone (DILAUDID) PCA 0.2 mg/mL OPIOID TOLERANT       Intravenous CONTINUOUS 07/27/20 1900 07/27/20 1900  dexmedetomidine (PRECEDEX) 400 mcg in 0.9% sodium chloride 100 mL      0.2-0.7 mcg/kg/hr × 63.1 kg  3.2-11 mL/hr  Intravenous CONTINUOUS 07/27/20 1859 07/27/20 0930  ropivacaine 0.2% (NAROPIN) 750 mL in ON-Q C-Bloc select flow ([id number] holds 600-750 mL) dual cath disposable pump      14 mL/hr  Irrigation Continuous Nerve Block 07/27/20 0928             Objective:  Lab results  Recent Labs   Lab Test 07/29/20  0408   WBC 20.2*   RBC 2.72*   HGB 7.7*   HCT 24.7*   MCV 91   MCH 28.3   MCHC 31.2*   RDW 13.8          Lab Results   Component Value Date    INR 1.68 07/27/2020    INR 1.06 07/23/2020       Vitals:    Temp:  [97.5  F (36.4  C)-101.6  F (38.7  C)] 101.6  F (38.7  C)  Pulse:  [] 114  Heart Rate:  [] 114  Resp:  [10-27] 20  BP: ()/(53-79) 98/54  MAP:  [69 mmHg-105 mmHg] 99 mmHg  Arterial Line BP: (101-134)/(51-85) 106/83  SpO2:  [70 %-100 %] 93 %    Exam:   GEN: drowsy, no distress and cooperative  NEURO/MSK: moves upper and lower extremites spontaneously, full strength and overall symmetric  SKIN: bilateral paravertebral (PV) catheter site with dressing c/d/i, no tenderness, erythema, heme, edema      Assessment and Plan:     POD #2 s/p PANCREATECTOMY, TOTAL, WITH AUTOLOGOUS PANCREATIC ISLET CELL TRANSPLANT, Splenectomy, gastrojejunostomy placement)  Patient is receiving adequate analgesia with current multimodal therapy including ROpivacaine 0.2% at 14 mL/hr, 7 mL/hr each catheter + Q 12 hr PRN bolus.  Motor function intact and adequate sensory block, is meeting activity goals.  No evidence of adverse side effects related to local anesthetic.     0830 Cinician administered nerve block bolus  VSS   PF BUPivacaine 0.25%, total bolus 10 mL, 5 mL via right and left catheters, administered without  complication, negative aspirate before and between each mL.  No symptoms of local anesthetic systemic toxicity (LAST). Remained with and assessed patient for 10 min post-injection. BP, P, and MAP stable.  Bedside RN aware of need to continue to monitoring and document BP, P, and MAP Q 10 min for an additional 20 min. Contact RAPS (jobcode ID [id number]) if any of the following: patient experiencing any untoward effects, SBP < 90, P < 50 or > 120, MAP < 60     1450 Follow up: Patient reports pain well controlled today following nerve block bolus, she likes the benefit it provides.  Patient requests that a bolus be given this evening.    PLAN  - patient can be evaluated to receive local anesthetic bolus Q 12 hr PRN pain not controlled with continuous infusion.  Bedside RN must page RAPS to request bolus  - continue ROpivacaine 0.2% infusion rate at 14 mL/hr, 7 mL/hr each catheter  - expected change of next On-Q pump is today, new OnQ ordered by RN  - antithrombotic/thrombolytic therapy okay to continue Heparin IV infusion as ordered. Please contact RAPS (#[phone]) prior to any medication changes  - will continue to follow and adjust as needed    - discussed plan with attending anesthesiologist    JUJU Dhaliwal [facility]  Regional Anesthesia Pain Service  7/29/2020 8:39 AM    RAPS Contact Info (24 hour job code pager is the last 4 digits) For in-house use only:   Job code ID: [email or web address] [id number]   [facility]  [facility] 0602  [employer] phone: dial * * * [phone], enter jobcode ID, then enter call-back number.    Text: Use [employer] on the Intranet <Paging/Directory> tab and enter Jobcode ID.   If no call back at any time, contact the hospital  and ask for RAPS attending or backup

## 2020-07-29 NOTE — PLAN OF CARE
Major shift events: Precedex off this am. A2 up to chair this morning. Chair time 3.5 hours. Ropivacaine bolus given this morning. Prn oxy PO discontinued. Ativan discontinued. Ketamine q6hr 2.5-5mg. Tmax 102.2; blood cultures and UA sent. Afebrile this evening, last temp 98.2 oral. Sinus tach 110-120's. O2 2L NC. Full liquid diet; advance as tolerated. No BM this shift.  Zofran and compazine prn given. Trickle feed 20ml/hr. Relizorb cartridge changed at 12pm this afternoon. Insulin gtt 1-4units/hr. IV maint LR with D5 decreased to 50ml/hr. Heparin increased to 400units/hr. A-line removed. Mag and phos replaced.     Plan: Remove zaragoza catheter and transfer to floor tomorrow. PRN atarax. Change relizorb 7/30 @12pm. Continue POC, notify MD for any changes or concern.

## 2020-07-29 NOTE — PROGRESS NOTES
"Pancreatitis Service - Daily Progress Note  07/29/2020    Assessment & Plan: Dottie Hernandez is a 54 year old female with a past medical history significant for chronic pancreatitis that began 20 years ago attributed to pancreatic divisum, seasonal allergies, migraines, and ANIBAL. She is now s/p TPAIT and splenectomy with stent placement with Dr. Jarvis on 7/27/20.     Cardiorespiratory: Increased O2 needs this AM. CXR this AM shows \"Mild to moderate bilateral pleural effusions, left greater than right. 2. Hazy opacifications of the left lung may represent atelectasis versus infection. Recommend follow-up to clearing.\" Decreased IVF. Continue pulmonary hygiene.  Heme:  Acute blood loss anemia: EBL intra-op 500mL; received 1unit pRBC. Hgb 7.7 with morning labs; continue to monitor.  GI/Nutrition: NG removed. Keep G-tube to gravity. Tube feeds per J-tube advanced to 20mL/hr. Ok to advance by 10 mL/hr every day. Continue multivitamin.  Fluid/Electrolytes: D5LR at 50mL/hr; electrolyte replacements per ICU protocol  : Armendariz to remain due to strict I&O  Post-pancreatectomy diabetes: BG 's with insulin drip at 0.2-5.5units/hr, appreciate Endocrine consult.  Infection: Afebrile; WBC 20.2 (14.8), s/p splenectomy. Continue to monitor  Positive islet culture: growing gram negative rods. Start zosyn x5 days.   Prophylaxis: PPI (Protonix), Anticoagulation: increase heparin drip to 400 units/hr, fungal (fluconazole)  Pain control: fair, pain team following. Current regimen:  -Precedex drip  -On-Q continuous paravertebral blocks  -Dilaudid PCA 0.2mg Q10min + 0.2mg/hr  -gabapentin 300mg BID  -ketamine 5-10mg Q6H  -lorazepam decreased to 0.25mg Q6H  -hydroxyzine 25mg Q6H PRN  Activity: as tolerated; PT/OT ordered  Anticipated LOS/Discharge: anticipate ~7 days; SICU while on Precedex drip    Medical Decision Making: High  Subsequent visit 71784 (high level decision making)    ANGELO/Fellow/Resident Provider: Anayeli Lyles, " "KINGS [id number]    Faculty: Steve Jarvis MD  __________________________________________________________________  Transplant History: Admitted 7/27/2020 for TPAIT  7/27/2020 (Islet), Postoperative day: 2     Interval History: History is obtained from the patient  Patient is somnolent but arousable.    ROS:   A 10-point review of systems was negative except as noted above.    Curent Meds:    fluconazole  400 mg Intravenous Q24H     gabapentin  300 mg Oral or J tube BID     ketamine  5-10 mg Intravenous Q6H     LORazepam  0.25 mg Intravenous Q6H     multivitamins w/minerals  15 mL Oral or Feeding Tube Daily     pantoprazole  40 mg Oral or J tube Daily     piperacillin-tazobactam  3.375 g Intravenous Q6H     disposable pump w/ anesthetic (select flow)   Irrigation Continuous Nerve Block     sodium chloride (PF)  3 mL Intracatheter Q8H       Physical Exam:     Admit Weight: 63.1 kg (139 lb 1.8 oz)    Current Vitals:   /63   Pulse 121   Temp 98.2  F (36.8  C) (Oral)   Resp 11   Ht 1.651 m (5' 5\")   Wt 70.2 kg (154 lb 12.2 oz)   SpO2 96%   BMI 25.75 kg/m      CVP (mmHg): 7 mmHg    Vital sign ranges:    Temp:  [97.6  F (36.4  C)-101.6  F (38.7  C)] 98.2  F (36.8  C)  Pulse:  [112-147] 121  Heart Rate:  [104-144] 122  Resp:  [10-27] 11  BP: ()/(54-87) 104/63  MAP:  [87 mmHg-105 mmHg] 99 mmHg  Arterial Line BP: (106-133)/(66-85) 106/83  SpO2:  [70 %-100 %] 96 %  Patient Vitals for the past 24 hrs:   BP Temp Temp src Pulse Heart Rate Resp SpO2 Weight   07/29/20 1300 104/63 -- -- 121 122 11 96 % --   07/29/20 1200 97/86 98.2  F (36.8  C) Oral 121 118 25 (!) 88 % --   07/29/20 1100 100/68 -- -- 112 112 21 97 % --   07/29/20 1000 118/65 -- -- 118 114 17 96 % --   07/29/20 0920 114/71 -- -- 121 116 21 -- --   07/29/20 0910 106/64 -- -- 116 120 10 -- --   07/29/20 0900 108/87 -- -- 123 120 23 -- --   07/29/20 0850 114/84 -- -- 123 123 21 -- --   07/29/20 0840 103/62 -- -- 115 115 18 -- --   07/29/20 0830 97/62 -- " -- 116 122 16 -- --   07/29/20 0800 98/54 101.6  F (38.7  C) Axillary 114 114 20 93 % --   07/29/20 0700 100/73 -- -- 118 117 20 92 % --   07/29/20 0630 109/58 -- -- 124 124 20 90 % --   07/29/20 0615 -- -- -- -- 137 24 (!) 70 % --   07/29/20 0600 (!) 146/79 -- -- 123 129 21 94 % --   07/29/20 0530 (!) 152/77 -- -- 124 124 23 93 % --   07/29/20 0500 114/75 -- -- 131 134 24 92 % --   07/29/20 0430 118/71 -- -- 126 128 19 97 % --   07/29/20 0400 122/68 97.6  F (36.4  C) Oral 147 144 20 96 % --   07/29/20 0330 100/78 -- -- 125 125 23 90 % --   07/29/20 0300 109/65 -- -- 130 134 20 95 % --   07/29/20 0230 93/65 -- -- 124 125 20 96 % --   07/29/20 0200 113/70 -- -- 133 125 19 97 % 70.2 kg (154 lb 12.2 oz)   07/29/20 0130 110/57 -- -- 126 126 22 91 % --   07/29/20 0100 106/71 -- -- 131 131 21 92 % --   07/29/20 0030 113/63 -- -- 130 130 27 90 % --   07/29/20 0000 112/68 98.8  F (37.1  C) Oral 130 128 20 98 % --   07/28/20 2300 91/74 -- -- 118 118 15 99 % --   07/28/20 2200 106/61 -- -- -- 117 16 100 % --   07/28/20 2130 -- -- -- -- 130 12 98 % --   07/28/20 2100 100/62 -- -- 118 121 21 95 % --   07/28/20 2030 -- -- -- -- -- -- 90 % --   07/28/20 2000 -- 99  F (37.2  C) Oral -- 123 15 90 % --   07/28/20 1930 -- -- -- -- 123 15 96 % --   07/28/20 1900 -- -- -- -- 126 10 90 % --   07/28/20 1800 -- -- -- -- 121 14 94 % --   07/28/20 1700 -- -- -- -- 115 15 99 % --   07/28/20 1600 -- 98  F (36.7  C) Oral -- 112 16 100 % --   07/28/20 1500 -- -- -- -- 104 11 100 % --   07/28/20 1400 -- -- -- -- 112 19 100 % --     General Appearance: in no apparent distress.   Skin: normal, warm, dry, no suspicious lesions or rashes  HEENT: G tube to gravity drainage, scant output  Heart: regular rate and rhythm  Lungs: On O2 via face mask with nasal trumpet, lung sounds CTA anterior, difficult to hear posterior due to patient position  Abdomen: The abdomen is rounded, soft and mildly tender to palpation. The wound is covered; scant drainage  marked. G tube to gravity. WILMA: thin dark red output, small   : zaragoza is present- clear yellow output  Extremities: edema: present generalized.     Data:   CMP  Recent Labs   Lab 07/29/20 0408 07/28/20 2204 07/28/20 0354 07/27/20  1915 07/27/20  1708 07/27/20  1503     --   --  143 142 141 140   POTASSIUM 4.6 3.9   < > 3.3* 4.2 3.7 4.2   CHLORIDE 109  --   --  112* 112*  --   --    CO2 28  --   --  27 26  --   --    *  --   --  107* 100* 95 134*   BUN 6*  --   --  7 7  --   --    CR 0.63  --   --  0.60 0.56  --   --    GFRESTIMATED >90  --   --  >90 >90  --   --    GFRESTBLACK >90  --   --  >90 >90  --   --    MONICA 8.1*  --   --  7.7* 7.7*  --   --    ICAW  --   --   --   --   --  4.5 4.5   MAG 1.8  --   --  1.7  --   --   --    PHOS 1.5*  --   --  2.1*  --   --   --    AMYLASE  --   --   --  76  --   --   --    LIPASE  --   --   --  981*  --   --   --    ALBUMIN  --   --   --  2.7* 3.0*  --   --    BILITOTAL  --   --   --  0.3 0.6  --   --    ALKPHOS  --   --   --  39* 39*  --   --    AST  --   --   --  59* 89*  --   --    ALT  --   --   --  52* 57*  --   --     < > = values in this interval not displayed.     CBC  Recent Labs   Lab 07/29/20 0408 07/28/20 2013 07/28/20 0354 07/23/20  0835   HGB 7.7* 7.2*   < > 8.1*   < > 14.5   WBC 20.2*  --   --  14.8*   < > 5.2     --   --  177   < > 292   A1C  --   --   --   --   --  6.0*    < > = values in this interval not displayed.     Coags  Recent Labs   Lab 07/29/20  0408 07/28/20  0354  07/27/20  1915 07/23/20  0835   INR  --   --   --  1.68* 1.06   PTT 31 34   < >  --   --     < > = values in this interval not displayed.      Urinalysis  Recent Labs   Lab Test 07/29/20  0924 07/23/20  0805   COLOR Light Yellow Yellow   APPEARANCE Clear Slightly Cloudy   URINEGLC Negative Negative   URINEBILI Negative Negative   URINEKETONE Negative 5*   SG 1.018 1.020   UBLD Negative Negative   URINEPH 5.5 6.0   PROTEIN Negative Negative   NITRITE Negative  Negative   LEUKEST Negative Large*   RBCU 3* 6*   WBCU 3 94*

## 2020-07-29 NOTE — PLAN OF CARE
4A PT -   Discharge Planner PT   Patient plan for discharge: not discussed  Current status: patient greeted supine in bed on RA with SPO2 sats >90%. Reinforced abdominal precautions and performed log roll with mod A from supine>sit. Patient performed stand pivot transfer from bed to chair with 2 person min A. From standing in front of chair patient performed x10 (each leg) marches in place with continued 2 person min A. Following marching patient with increase in HR to 124 and highly fatigued, returned to sitting. Ended with seated LAQ against manual resistance.  Barriers to return to prior living situation: medical status, impaired functional mobility  Recommendations for discharge: anticipate home when pain and cognition improve  Rationale for recommendations: Anticipate patient will be appropriate to return home from PT perspective when medically stable, will continue to follow and update as appropriate.  Entered by: Christian Lowery 07/29/2020 1:22 PM

## 2020-07-30 ENCOUNTER — APPOINTMENT (OUTPATIENT)
Dept: PHYSICAL THERAPY | Facility: CLINIC | Age: 55
End: 2020-07-30
Attending: TRANSPLANT SURGERY
Payer: COMMERCIAL

## 2020-07-30 ENCOUNTER — APPOINTMENT (OUTPATIENT)
Dept: GENERAL RADIOLOGY | Facility: CLINIC | Age: 55
End: 2020-07-30
Attending: ANESTHESIOLOGY
Payer: COMMERCIAL

## 2020-07-30 LAB
ABO + RH BLD: NORMAL
ABO + RH BLD: NORMAL
ALBUMIN SERPL-MCNC: 1.9 G/DL (ref 3.4–5)
ALP SERPL-CCNC: 72 U/L (ref 40–150)
ALT SERPL W P-5'-P-CCNC: 28 U/L (ref 0–50)
ANION GAP SERPL CALCULATED.3IONS-SCNC: 6 MMOL/L (ref 3–14)
APTT PPP: 44 SEC (ref 22–37)
AST SERPL W P-5'-P-CCNC: 22 U/L (ref 0–45)
BASOPHILS # BLD AUTO: 0 10E9/L (ref 0–0.2)
BASOPHILS NFR BLD AUTO: 0.2 %
BILIRUB DIRECT SERPL-MCNC: 0.1 MG/DL (ref 0–0.2)
BILIRUB SERPL-MCNC: 0.4 MG/DL (ref 0.2–1.3)
BLD GP AB SCN SERPL QL: NORMAL
BLD PROD TYP BPU: NORMAL
BLD UNIT ID BPU: 0
BLOOD BANK CMNT PATIENT-IMP: NORMAL
BLOOD PRODUCT CODE: NORMAL
BPU ID: NORMAL
BUN SERPL-MCNC: 8 MG/DL (ref 7–30)
CALCIUM SERPL-MCNC: 7.2 MG/DL (ref 8.5–10.1)
CHLORIDE SERPL-SCNC: 105 MMOL/L (ref 94–109)
CO2 SERPL-SCNC: 28 MMOL/L (ref 20–32)
COPATH REPORT: NORMAL
CREAT SERPL-MCNC: 0.62 MG/DL (ref 0.52–1.04)
DIFFERENTIAL METHOD BLD: ABNORMAL
EOSINOPHIL # BLD AUTO: 0 10E9/L (ref 0–0.7)
EOSINOPHIL NFR BLD AUTO: 0.2 %
ERYTHROCYTE [DISTWIDTH] IN BLOOD BY AUTOMATED COUNT: 13.7 % (ref 10–15)
ERYTHROCYTE [DISTWIDTH] IN BLOOD BY AUTOMATED COUNT: 13.8 % (ref 10–15)
GFR SERPL CREATININE-BSD FRML MDRD: >90 ML/MIN/{1.73_M2}
GLUCOSE BLDC GLUCOMTR-MCNC: 102 MG/DL (ref 70–99)
GLUCOSE BLDC GLUCOMTR-MCNC: 104 MG/DL (ref 70–99)
GLUCOSE BLDC GLUCOMTR-MCNC: 105 MG/DL (ref 70–99)
GLUCOSE BLDC GLUCOMTR-MCNC: 108 MG/DL (ref 70–99)
GLUCOSE BLDC GLUCOMTR-MCNC: 111 MG/DL (ref 70–99)
GLUCOSE BLDC GLUCOMTR-MCNC: 112 MG/DL (ref 70–99)
GLUCOSE BLDC GLUCOMTR-MCNC: 115 MG/DL (ref 70–99)
GLUCOSE BLDC GLUCOMTR-MCNC: 117 MG/DL (ref 70–99)
GLUCOSE BLDC GLUCOMTR-MCNC: 120 MG/DL (ref 70–99)
GLUCOSE BLDC GLUCOMTR-MCNC: 133 MG/DL (ref 70–99)
GLUCOSE BLDC GLUCOMTR-MCNC: 146 MG/DL (ref 70–99)
GLUCOSE BLDC GLUCOMTR-MCNC: 147 MG/DL (ref 70–99)
GLUCOSE BLDC GLUCOMTR-MCNC: 157 MG/DL (ref 70–99)
GLUCOSE BLDC GLUCOMTR-MCNC: 163 MG/DL (ref 70–99)
GLUCOSE BLDC GLUCOMTR-MCNC: 174 MG/DL (ref 70–99)
GLUCOSE BLDC GLUCOMTR-MCNC: 176 MG/DL (ref 70–99)
GLUCOSE BLDC GLUCOMTR-MCNC: 83 MG/DL (ref 70–99)
GLUCOSE SERPL-MCNC: 168 MG/DL (ref 70–99)
HCT VFR BLD AUTO: 19.2 % (ref 35–47)
HCT VFR BLD AUTO: 21.7 % (ref 35–47)
HGB BLD-MCNC: 6 G/DL (ref 11.7–15.7)
HGB BLD-MCNC: 7 G/DL (ref 11.7–15.7)
IMM GRANULOCYTES # BLD: 0.1 10E9/L (ref 0–0.4)
IMM GRANULOCYTES NFR BLD: 0.7 %
INTERPRETATION ECG - MUSE: NORMAL
LACTATE BLD-SCNC: 1.3 MMOL/L (ref 0.7–2)
LACTATE BLD-SCNC: 2.2 MMOL/L (ref 0.7–2)
LACTATE BLD-SCNC: 2.3 MMOL/L (ref 0.7–2)
LYMPHOCYTES # BLD AUTO: 2.1 10E9/L (ref 0.8–5.3)
LYMPHOCYTES NFR BLD AUTO: 11 %
MAGNESIUM SERPL-MCNC: 1.9 MG/DL (ref 1.6–2.3)
MCH RBC QN AUTO: 28.4 PG (ref 26.5–33)
MCH RBC QN AUTO: 28.6 PG (ref 26.5–33)
MCHC RBC AUTO-ENTMCNC: 31.3 G/DL (ref 31.5–36.5)
MCHC RBC AUTO-ENTMCNC: 32.3 G/DL (ref 31.5–36.5)
MCV RBC AUTO: 89 FL (ref 78–100)
MCV RBC AUTO: 91 FL (ref 78–100)
MONOCYTES # BLD AUTO: 1.3 10E9/L (ref 0–1.3)
MONOCYTES NFR BLD AUTO: 6.7 %
NEUTROPHILS # BLD AUTO: 15.4 10E9/L (ref 1.6–8.3)
NEUTROPHILS NFR BLD AUTO: 81.2 %
NRBC # BLD AUTO: 0.1 10*3/UL
NRBC BLD AUTO-RTO: 0 /100
PHOSPHATE SERPL-MCNC: 1.4 MG/DL (ref 2.5–4.5)
PLATELET # BLD AUTO: 216 10E9/L (ref 150–450)
PLATELET # BLD AUTO: 227 10E9/L (ref 150–450)
POTASSIUM SERPL-SCNC: 3.5 MMOL/L (ref 3.4–5.3)
PROT SERPL-MCNC: 4.1 G/DL (ref 6.8–8.8)
RBC # BLD AUTO: 2.11 10E12/L (ref 3.8–5.2)
RBC # BLD AUTO: 2.45 10E12/L (ref 3.8–5.2)
SODIUM SERPL-SCNC: 139 MMOL/L (ref 133–144)
SPECIMEN EXP DATE BLD: NORMAL
TRANSFUSION STATUS PATIENT QL: NORMAL
TRANSFUSION STATUS PATIENT QL: NORMAL
WBC # BLD AUTO: 18.9 10E9/L (ref 4–11)
WBC # BLD AUTO: 20.9 10E9/L (ref 4–11)

## 2020-07-30 PROCEDURE — 85027 COMPLETE CBC AUTOMATED: CPT | Performed by: SURGERY

## 2020-07-30 PROCEDURE — 83605 ASSAY OF LACTIC ACID: CPT | Performed by: STUDENT IN AN ORGANIZED HEALTH CARE EDUCATION/TRAINING PROGRAM

## 2020-07-30 PROCEDURE — 25000125 ZZHC RX 250: Performed by: PHYSICIAN ASSISTANT

## 2020-07-30 PROCEDURE — 83735 ASSAY OF MAGNESIUM: CPT | Performed by: PHYSICIAN ASSISTANT

## 2020-07-30 PROCEDURE — 25000128 H RX IP 250 OP 636: Performed by: PHYSICIAN ASSISTANT

## 2020-07-30 PROCEDURE — 99291 CRITICAL CARE FIRST HOUR: CPT | Mod: GC | Performed by: SURGERY

## 2020-07-30 PROCEDURE — 25800030 ZZH RX IP 258 OP 636: Performed by: PHYSICIAN ASSISTANT

## 2020-07-30 PROCEDURE — 83605 ASSAY OF LACTIC ACID: CPT | Performed by: SURGERY

## 2020-07-30 PROCEDURE — 94660 CPAP INITIATION&MGMT: CPT

## 2020-07-30 PROCEDURE — 25000132 ZZH RX MED GY IP 250 OP 250 PS 637: Performed by: PHYSICIAN ASSISTANT

## 2020-07-30 PROCEDURE — 25000132 ZZH RX MED GY IP 250 OP 250 PS 637: Performed by: STUDENT IN AN ORGANIZED HEALTH CARE EDUCATION/TRAINING PROGRAM

## 2020-07-30 PROCEDURE — 84100 ASSAY OF PHOSPHORUS: CPT | Performed by: PHYSICIAN ASSISTANT

## 2020-07-30 PROCEDURE — 25000128 H RX IP 250 OP 636

## 2020-07-30 PROCEDURE — 97530 THERAPEUTIC ACTIVITIES: CPT | Mod: GP

## 2020-07-30 PROCEDURE — 86900 BLOOD TYPING SEROLOGIC ABO: CPT | Performed by: STUDENT IN AN ORGANIZED HEALTH CARE EDUCATION/TRAINING PROGRAM

## 2020-07-30 PROCEDURE — 85730 THROMBOPLASTIN TIME PARTIAL: CPT | Performed by: PHYSICIAN ASSISTANT

## 2020-07-30 PROCEDURE — 80076 HEPATIC FUNCTION PANEL: CPT | Performed by: PHYSICIAN ASSISTANT

## 2020-07-30 PROCEDURE — 71045 X-RAY EXAM CHEST 1 VIEW: CPT

## 2020-07-30 PROCEDURE — 25000128 H RX IP 250 OP 636: Performed by: STUDENT IN AN ORGANIZED HEALTH CARE EDUCATION/TRAINING PROGRAM

## 2020-07-30 PROCEDURE — 25800030 ZZH RX IP 258 OP 636: Performed by: TRANSPLANT SURGERY

## 2020-07-30 PROCEDURE — 27210436 ZZH NUTRITION PRODUCT SEMIELEM INTERMED CAN

## 2020-07-30 PROCEDURE — 20000004 ZZH R&B ICU UMMC

## 2020-07-30 PROCEDURE — 86901 BLOOD TYPING SEROLOGIC RH(D): CPT | Performed by: STUDENT IN AN ORGANIZED HEALTH CARE EDUCATION/TRAINING PROGRAM

## 2020-07-30 PROCEDURE — 94003 VENT MGMT INPAT SUBQ DAY: CPT

## 2020-07-30 PROCEDURE — 25000128 H RX IP 250 OP 636: Performed by: TRANSPLANT SURGERY

## 2020-07-30 PROCEDURE — 85025 COMPLETE CBC W/AUTO DIFF WBC: CPT | Performed by: PHYSICIAN ASSISTANT

## 2020-07-30 PROCEDURE — 80048 BASIC METABOLIC PNL TOTAL CA: CPT | Performed by: PHYSICIAN ASSISTANT

## 2020-07-30 PROCEDURE — 25000132 ZZH RX MED GY IP 250 OP 250 PS 637

## 2020-07-30 PROCEDURE — 40000275 ZZH STATISTIC RCP TIME EA 10 MIN

## 2020-07-30 PROCEDURE — 00000146 ZZHCL STATISTIC GLUCOSE BY METER IP

## 2020-07-30 PROCEDURE — 86850 RBC ANTIBODY SCREEN: CPT | Performed by: STUDENT IN AN ORGANIZED HEALTH CARE EDUCATION/TRAINING PROGRAM

## 2020-07-30 PROCEDURE — 25000125 ZZHC RX 250: Performed by: NURSE PRACTITIONER

## 2020-07-30 PROCEDURE — 40000196 ZZH STATISTIC RAPCV CVP MONITORING

## 2020-07-30 RX ORDER — ACETAMINOPHEN 325 MG/1
325 TABLET ORAL ONCE
Status: COMPLETED | OUTPATIENT
Start: 2020-07-30 | End: 2020-07-30

## 2020-07-30 RX ORDER — KETOROLAC TROMETHAMINE 15 MG/ML
15 INJECTION, SOLUTION INTRAMUSCULAR; INTRAVENOUS EVERY 6 HOURS
Status: COMPLETED | OUTPATIENT
Start: 2020-07-30 | End: 2020-08-04

## 2020-07-30 RX ORDER — FUROSEMIDE 10 MG/ML
5 INJECTION INTRAMUSCULAR; INTRAVENOUS ONCE
Status: COMPLETED | OUTPATIENT
Start: 2020-07-30 | End: 2020-07-30

## 2020-07-30 RX ORDER — GABAPENTIN 250 MG/5ML
300 SOLUTION ORAL EVERY 8 HOURS SCHEDULED
Status: DISCONTINUED | OUTPATIENT
Start: 2020-07-30 | End: 2020-08-06 | Stop reason: HOSPADM

## 2020-07-30 RX ORDER — BUPIVACAINE HYDROCHLORIDE 5 MG/ML
5 INJECTION, SOLUTION EPIDURAL; INTRACAUDAL ONCE
Status: COMPLETED | OUTPATIENT
Start: 2020-07-30 | End: 2020-07-30

## 2020-07-30 RX ORDER — ACETAMINOPHEN 325 MG/1
650 TABLET ORAL EVERY 4 HOURS PRN
Status: DISCONTINUED | OUTPATIENT
Start: 2020-07-30 | End: 2020-07-31

## 2020-07-30 RX ADMIN — GABAPENTIN 300 MG: 250 SUSPENSION ORAL at 13:21

## 2020-07-30 RX ADMIN — PIPERACILLIN AND TAZOBACTAM 3.38 G: 3; .375 INJECTION, POWDER, FOR SOLUTION INTRAVENOUS at 02:20

## 2020-07-30 RX ADMIN — VANCOMYCIN HYDROCHLORIDE 1250 MG: 10 INJECTION, POWDER, LYOPHILIZED, FOR SOLUTION INTRAVENOUS at 11:21

## 2020-07-30 RX ADMIN — ACETAMINOPHEN 650 MG: 325 TABLET, FILM COATED ORAL at 11:21

## 2020-07-30 RX ADMIN — BUPIVACAINE HYDROCHLORIDE 25 MG: 5 INJECTION, SOLUTION EPIDURAL; INTRACAUDAL at 09:42

## 2020-07-30 RX ADMIN — PIPERACILLIN AND TAZOBACTAM 3.38 G: 3; .375 INJECTION, POWDER, FOR SOLUTION INTRAVENOUS at 07:56

## 2020-07-30 RX ADMIN — KETOROLAC TROMETHAMINE 15 MG: 15 INJECTION, SOLUTION INTRAMUSCULAR; INTRAVENOUS at 13:23

## 2020-07-30 RX ADMIN — KETAMINE HYDROCHLORIDE 5 MG: 10 INJECTION INTRAMUSCULAR; INTRAVENOUS at 02:20

## 2020-07-30 RX ADMIN — POTASSIUM & SODIUM PHOSPHATES POWDER PACK 280-160-250 MG 1 PACKET: 280-160-250 PACK at 17:51

## 2020-07-30 RX ADMIN — KETAMINE HYDROCHLORIDE 5 MG: 10 INJECTION INTRAMUSCULAR; INTRAVENOUS at 07:57

## 2020-07-30 RX ADMIN — POTASSIUM & SODIUM PHOSPHATES POWDER PACK 280-160-250 MG 1 PACKET: 280-160-250 PACK at 19:44

## 2020-07-30 RX ADMIN — GABAPENTIN 300 MG: 250 SUSPENSION ORAL at 07:58

## 2020-07-30 RX ADMIN — POTASSIUM CHLORIDE 20 MEQ: 1.5 POWDER, FOR SOLUTION ORAL at 06:06

## 2020-07-30 RX ADMIN — PIPERACILLIN AND TAZOBACTAM 3.38 G: 3; .375 INJECTION, POWDER, FOR SOLUTION INTRAVENOUS at 19:44

## 2020-07-30 RX ADMIN — HUMAN INSULIN 2 UNITS/HR: 100 INJECTION, SOLUTION SUBCUTANEOUS at 12:09

## 2020-07-30 RX ADMIN — KETOROLAC TROMETHAMINE 15 MG: 15 INJECTION, SOLUTION INTRAMUSCULAR; INTRAVENOUS at 17:51

## 2020-07-30 RX ADMIN — POTASSIUM & SODIUM PHOSPHATES POWDER PACK 280-160-250 MG 1 PACKET: 280-160-250 PACK at 11:21

## 2020-07-30 RX ADMIN — POTASSIUM & SODIUM PHOSPHATES POWDER PACK 280-160-250 MG 1 PACKET: 280-160-250 PACK at 10:03

## 2020-07-30 RX ADMIN — FLUCONAZOLE IN SODIUM CHLORIDE 400 MG: 2 INJECTION, SOLUTION INTRAVENOUS at 05:00

## 2020-07-30 RX ADMIN — PIPERACILLIN AND TAZOBACTAM 3.38 G: 3; .375 INJECTION, POWDER, FOR SOLUTION INTRAVENOUS at 13:36

## 2020-07-30 RX ADMIN — SODIUM CHLORIDE, SODIUM LACTATE, POTASSIUM CHLORIDE, CALCIUM CHLORIDE AND DEXTROSE MONOHYDRATE: 5; 600; 310; 30; 20 INJECTION, SOLUTION INTRAVENOUS at 14:17

## 2020-07-30 RX ADMIN — FUROSEMIDE 5 MG: 10 INJECTION, SOLUTION INTRAVENOUS at 13:24

## 2020-07-30 RX ADMIN — KETOROLAC TROMETHAMINE 15 MG: 15 INJECTION, SOLUTION INTRAMUSCULAR; INTRAVENOUS at 23:53

## 2020-07-30 RX ADMIN — VANCOMYCIN HYDROCHLORIDE 1250 MG: 10 INJECTION, POWDER, LYOPHILIZED, FOR SOLUTION INTRAVENOUS at 23:01

## 2020-07-30 RX ADMIN — GABAPENTIN 300 MG: 250 SUSPENSION ORAL at 21:49

## 2020-07-30 RX ADMIN — ACETAMINOPHEN 650 MG: 325 TABLET, FILM COATED ORAL at 03:48

## 2020-07-30 RX ADMIN — SODIUM PHOSPHATE, MONOBASIC, MONOHYDRATE AND SODIUM PHOSPHATE, DIBASIC, ANHYDROUS 20 MMOL: 276; 142 INJECTION, SOLUTION INTRAVENOUS at 14:17

## 2020-07-30 RX ADMIN — Medication 40 MG: at 07:57

## 2020-07-30 RX ADMIN — Medication: at 11:50

## 2020-07-30 RX ADMIN — ACETAMINOPHEN 325 MG: 325 TABLET, FILM COATED ORAL at 03:48

## 2020-07-30 RX ADMIN — MULTIVITAMIN 15 ML: LIQUID ORAL at 07:57

## 2020-07-30 ASSESSMENT — MIFFLIN-ST. JEOR: SCORE: 1326.88

## 2020-07-30 ASSESSMENT — ACTIVITIES OF DAILY LIVING (ADL)
ADLS_ACUITY_SCORE: 17
ADLS_ACUITY_SCORE: 16
ADLS_ACUITY_SCORE: 17

## 2020-07-30 ASSESSMENT — PAIN DESCRIPTION - DESCRIPTORS
DESCRIPTORS: SHARP;ACHING
DESCRIPTORS: SHARP;ACHING

## 2020-07-30 NOTE — PROGRESS NOTES
Critical care attending note    Dottie Hernandez is a 53yo F with a past medical history of season allergies, migraines, ANIBAL, and chronic pancreatitis 2/2 pancreatic divisim who undewent laparoscopic TPIAT with Dr. Jarvis.    Around 9.30 PM the patient became progressively hypotensive, more somnolent. The SICU resident ordered CPAP in setting of newly diagnosed ANIBAL and sepsis workup including blood cultures, and added Vancomycin to zosyn and Diflucan.    Around 10 PM, we were called in the patient room for altered mental status, airway obstruction. The patient was unresponsive, and ventilation was instituted with Ambu Bag, and nasal airway use. We decided to intubate the patient for airway protection, due to lack mental status improvement with mask ventilation and nociceptive stimulation. VBG was sent and confirmed the diagnosis of hypercapnic respiratory failure.      After  intubation the patient became more hypotensive; and received iv fluids. The Hgb was 6 and 1 unit of PRBC was ordered.    After hypercapnia correction the patient became more responsive and agitated, therefore low dose Precedex was started.     I discussed her clinical status with the surgeon, Dr Jarvis. We made multiple attempts to update the family and they did not respond. Plan to PS and extubate in AM.    Pushpa Calloway MD  Anesthesiology Critical Care Medicine  Pg. [phone]

## 2020-07-30 NOTE — PROGRESS NOTES
REGIONAL ANESTHESIA PAIN SERVICE CONTINUOUS NERVE INFUSION NOTE  Dottie Hernandez is a 55 year old female with history of chronic pancreatitis secondary to pancreatic divisum who is now POD #3 s/p PANCREATECTOMY, TOTAL, WITH AUTOLOGOUS PANCREATIC ISLET CELL TRANSPLANT, Splenectomy, gastrojejunostomy placement) and placement of bilateral T6-7 paravertebral (PV) catheters for postoperative pain control.    Subjective and Interval History: Intubated yesterday ~ 2200 for hypercapneic respiratory failure.  Patient seen at 0940, remains intubated, able to communicate by writing and making hand gestures.  Agrees to having a nerve block bolus prior to extubation planned for later this morning.  Tolerating pain control with nerve block continuous infusion, has been off Dilaudid IV overnight, receiving ketamine as ordered (see below).  Moving arms and legs, denies circumoral numbness, metallic taste or tinnitus.      Antithrombotic/Thrombolytic Therapy ordered:  Heparin infusion at 400 units/hr  Analgesic Medications:   Medications related to Pain Management (From now, onward)    Start     Dose/Rate Route Frequency Ordered Stop    07/30/20 1400  gabapentin (NEURONTIN) solution 300 mg      300 mg Oral or J tube EVERY 8 HOURS SCHEDULED 07/30/20 0849      07/30/20 0318  acetaminophen (TYLENOL) tablet 650 mg      650 mg Oral EVERY 4 HOURS PRN 07/30/20 0318      07/29/20 2230  dexmedetomidine (PRECEDEX) 400 mcg in 0.9% sodium chloride 100 mL      0.2-0.7 mcg/kg/hr × 63.1 kg (Dosing Weight)  3.2-11 mL/hr  Intravenous CONTINUOUS 07/29/20 2215 07/27/20 2200  lidocaine 1 % 0.1-1 mL      0.1-1 mL Other EVERY 1 HOUR PRN 07/27/20 2200 07/27/20 2200  lidocaine (LMX4) cream       Topical EVERY 1 HOUR PRN 07/27/20 2200 07/27/20 2200  hydrOXYzine (ATARAX) syrup 25 mg      25 mg Oral EVERY 6 HOURS PRN 07/27/20 2200 07/27/20 1915  [Held by provider]  HYDROmorphone (DILAUDID) PCA 0.2 mg/mL OPIOID TOLERANT     (Held by  provider since Wed 7/29/2020 at 2215 by Gena Abdullahi MD. Reason: Change in Vitals.)     Intravenous CONTINUOUS 07/27/20 1900      07/27/20 0930  ropivacaine 0.2% (NAROPIN) 750 mL in ON-Q C-Bloc select flow ([id number] holds 600-750 mL) dual cath disposable pump      14 mL/hr  Irrigation Continuous Nerve Block 07/27/20 0928             Objective:  Lab results  Recent Labs   Lab Test 07/30/20  0456   WBC 18.9*   RBC 2.45*   HGB 7.0*   HCT 21.7*   MCV 89   MCH 28.6   MCHC 32.3   RDW 13.7          Lab Results   Component Value Date    INR 1.68 07/27/2020    INR 1.06 07/23/2020       Vitals:    Temp:  [98  F (36.7  C)-102.5  F (39.2  C)] 98  F (36.7  C)  Pulse:  [] 70  Heart Rate:  [] 70  Resp:  [0-33] 20  BP: ()/(29-86) 97/68  FiO2 (%):  [30 %-80 %] 30 %  SpO2:  [82 %-100 %] 100 %    Exam:   GEN: mild distress  NEURO/MSK: Full Strength upper and lower extremities  SKIN: bilateral paravertebral (PV) catheter sites with dressing c/d/i, no tenderness, erythema, heme, edema      Assessment and Plan:     Dottie Hernandez is a 55 year old female with history of chronic pancreatitis secondary to pancreatic divisum who is now POD #3 s/p PANCREATECTOMY, TOTAL, WITH AUTOLOGOUS PANCREATIC ISLET CELL TRANSPLANT, Splenectomy, gastrojejunostomy placement) and placement of bilateral T6-7 paravertebral (PV) catheters for postoperative pain control.    Patient intubated.  Analgesic benefit  with Q 12 hr local anesthetic bolus and nerve block infusion of ROpivacaine 0.2% at 14 mL/hr, 7 mL/hr each catheter.  Motor function intact and adequate sensory block.  No evidence of adverse side effects related to local anesthetic.     0945 Cinician administered nerve block bolus  VSS, MAP 60s   PF BUPivacaine 0.25%, total bolus 10 mL, 5 mL via right and left catheters, administered without complication, negative aspirate before and between each mL.  No symptoms of local anesthetic systemic toxicity (LAST). Remained  with and assessed patient for 10 min post-injection. BP, P, and MAP 71.  Bedside RN aware of need to continue to monitoring and document BP, P, and MAP Q 10 min for an additional 20 min. Contact RAPS (jobcode ID [id number]) if any of the following: patient experiencing any untoward effects, SBP < 90, P < 50 or > 120, MAP < 60     1140 Follow up: Patient extubated, awake, appears comfortable.     PLAN  - patient can be evaluated to receive local anesthetic bolus Q 12 hr PRN pain not controlled with continuous infusion.  Bedside RN must page RAPS to request bolus  - continue ROpivacaine 0.2% infusion rate at 14 mL/hr, 7 mL/hr each catheter  - expected change of next On-Q pump is tomorrow  - antithrombotic/thrombolytic therapy okay to continue heparin infusion as ordered. Please contact RAPS (#[phone]) prior to any medication changes  - will continue to follow and adjust as needed    - discussed plan with attending anesthesiologist    JUJU Dhaliwal [facility]  Regional Anesthesia Pain Service  7/30/2020 9:40 AM    RAPS Contact Info (24 hour job code pager is the last 4 digits) For in-house use only:   Job code ID: [email or web address] [id number]   [place] 0599  Peds 0602  [employer] phone: dial * * * [phone], enter jobcode ID, then enter call-back number.    Text: Use [employer] on the Intranet <Paging/Directory> tab and enter Jobcode ID.   If no call back at any time, contact the hospital  and ask for RAPS attending or backup  n

## 2020-07-30 NOTE — PROGRESS NOTES
REGIONAL ANESTHESIA PAIN SERVICE CONTINUOUS NERVE INFUSION NOTE  Dottie Hernandez is a 55 year old female with history of chronic pancreatitis secondary to pancreatic divisum who is now POD #3 s/p PANCREATECTOMY, TOTAL, WITH AUTOLOGOUS PANCREATIC ISLET CELL TRANSPLANT, Splenectomy, gastrojejunostomy placement) and placement of bilateral T6-7 paravertebral (PV) catheters for postoperative pain control.     Subjective and Interval History: Awake and conversing appropriately. On NC, satting at 95%. Reports pain to be 4-5/10. Has been out of bed today post extubation.      Objective:  Vitals:    Heart Rate:  100  BP:140/80 (102)     Exam:   GEN:not in distress  SKIN: bilateral paravertebral (PV) catheter sites with dressing c/d/i, no tenderness, erythema, heme, edema     Assessment and Plan:     Dottie Hernandez is a 55 year old female with history of chronic pancreatitis secondary to pancreatic divisum who is now POD #3 s/p PANCREATECTOMY, TOTAL, WITH AUTOLOGOUS PANCREATIC ISLET CELL TRANSPLANT, Splenectomy, gastrojejunostomy placement) and placement of bilateral T6-7 paravertebral (PV) catheters for postoperative pain control.     1820 Cinician administered nerve block bolus  Bupivacaine 0.25%, total bolus 10 mL, 5 mL via right and left catheters, administered without complication, negative aspirate before and between each mL.  No symptoms of local anesthetic systemic toxicity (LAST). Remained with and assessed patient for 10 min post-injection. BP, P, and MAP 71.  Bedside RN aware of need to continue to monitoring and document BP, P, and MAP Q 10 min for an additional 20 min. Contact RAPS (jobcode ID [id number]) if any of the following: patient experiencing any untoward effects, SBP < 90, P < 50 or > 120, MAP < 60       PLAN  - patient can be evaluated to receive local anesthetic bolus Q 12 hr PRN pain not controlled with continuous infusion.  Bedside RN must page RAPS to request bolus  - continue Ropivacaine 0.2%  infusion rate at 14 mL/hr, 7 mL/hr each catheter     Zee Khan MD  6:21 PM     RAPS Contact Info (24 hour job code pager is the last 4 digits) For in-house use only:   Job code ID: [place] [id number]   [place] 0599  Peds [phone]  [place] phone: dial * * * [phone], enter jobcode ID, then enter call-back number.    Text: Use [employer] on the Intranet <Paging/Directory> tab and enter Jobcode ID.   If no call back at any time, contact the hospital  and ask for RAPS attending or backup .

## 2020-07-30 NOTE — PLAN OF CARE
Major Shift Events:  5+ stools of loose consistency, large amounts of stool overall, all continent. @1700, large serosang dump from WILMA with previous low output. 200 ml/ 2 hours, MD notified, told to monitor  Plan: Continue insulin checks, watch WILMA, monitor pain  For vital signs and complete assessments, please see documentation flowsheets.

## 2020-07-30 NOTE — PROGRESS NOTES
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 Jarvis 7/27/2020.   She received 1116 islet equivalents per kg body weight.       total pancreatectomy with auto-islet cell transplant : A1C 6% on (7/23/2020) indicating pre-diabetes most likely 2/2 chronic pancreatitis     Plan  - continue on IV insulin, will transition once tub feeds are at goal, dextrose fluids are discontinued and tolerating the tube feeds  -D5LR at 125 ml/hour  -goal glucose   -hypoglycemia protocol  -will monitor peripherally until patient is on 7A                          Outpatient diabetes follow up: TBD  .           Interval History:   The last 24 hours progress and nursing notes reviewed.    Continues on IV insulin while the tube feeds are advancing.  IV rates increased around 2230, requiring up to 7 units/hour with glucose in the 200's  IV rates decreasing, but glucose not within target   Started on tube feeds on 7/28 at ~ 1700  Tube feeds have advanced to 20 ml/hour   Was intubated on (7/29) due to somnolent.possible extubation today  Is awake and responded to name with a 'thumbs-up\"    Is receiving medications in solution ( gabapentin, multivitamins, and prn Phos replacement)    Nutrition:   Impact peptide 1.5 via jejunostomy  at 20 ml/hour, goal 50 ml/hour  - D5LR at 125 ml/hour    Recent Labs   Lab 07/30/20  0613 07/30/20  0457 07/30/20  0456 07/30/20  0342 07/30/20  0227 07/30/20  0033 07/29/20  2300 07/29/20  2236  07/29/20  1859  07/29/20  0408  07/28/20  0354  07/27/20  1915   GLC  --   --  168*  --   --   --   --  248*  --  186*  --  154*  --  107*  --  100*   * 176*  --  133* 83 157* 226*  --    < >  --    < >  --    < >  --    < >  --     < > = values in this interval not displayed.               Review of Systems: " "  See interval hx          Medications:     Orders Placed This Encounter      Advance Diet as Tolerated: Full Liquid Diet    BP 92/64   Pulse 74   Temp 98  F (36.7  C) (Axillary)   Resp 21   Ht 1.651 m (5' 5\")   Wt 72.1 kg (158 lb 15.2 oz)   SpO2 100%   BMI 26.45 kg/m       General: NAD  HEENT: mucus membranes are moist  Resp: intubated and vented  Extremities: moving upper extremities  Neuro: awake and responding to verbal stimuli  BP 99/67   Pulse 76   Temp 102.1  F (38.9  C) (Axillary)   Resp 20   Ht 1.651 m (5' 5\")   Wt 72.1 kg (158 lb 15.2 oz)   SpO2 100%   BMI 26.45 kg/m             Data:     Lab Results   Component Value Date    A1C 6.0 07/23/2020              CBC RESULTS:   Recent Labs   Lab Test 07/29/20 0408   WBC 20.2*   RBC 2.72*   HGB 7.7*   HCT 24.7*   MCV 91   MCH 28.3   MCHC 31.2*   RDW 13.8        Recent Labs   Lab Test 07/29/20  0408 07/28/20  2204  07/28/20  0354     --   --  143   POTASSIUM 4.6 3.9   < > 3.3*   CHLORIDE 109  --   --  112*   CO2 28  --   --  27   ANIONGAP 5  --   --  4   *  --   --  107*   BUN 6*  --   --  7   CR 0.63  --   --  0.60   MONICA 8.1*  --   --  7.7*    < > = values in this interval not displayed.     Liver Function Studies -   Recent Labs   Lab Test 07/28/20  0354   PROTTOTAL 4.3*   ALBUMIN 2.7*   BILITOTAL 0.3   ALKPHOS 39*   AST 59*   ALT 52*     Lab Results   Component Value Date    INR 1.68 07/27/2020    INR 1.06 07/23/2020         I spent a total of 25 minutes bedside and on the inpatient unit managing the glycemic care of Dottie Hernandez. Over 50% of my time on the unit was spent counseling the patient  and/or coordinating care regarding .  See note for details.    Alicia Bolden -[phone]  Diabetes Management job code [phone]            "

## 2020-07-30 NOTE — PROGRESS NOTES
SURGICAL ICU PROGRESS NOTE  2020      PRIMARY TEAM: Transplant  PRIMARY PHYSICIAN: Matheus     REASON FOR CRITICAL CARE ADMISSION: LAP TP-IAT  ADMITTING PHYSICIAN: Perlman        ASSESSMENT: Dottie Hernandez is a 53yo F with a past medical history of season allergies, migraines, ANIBAL, and chronic pancreatitis 2/2 pancreatic divisim who undewent laparoscopic TPIAT with Dr. Jarvis 2020     PLAN:  Neuro/ pain/ sedation:  # Agitation   - Monitor neurological status. Delirium preventions and precautions.   - Pain: dilaudid PCA with basal rate, jimbo ketamine, jimbo ativan, paravertebral catheters -> now on precedex 0.3 from 0.5 overnight     - Attempt to keep basal rate low    - Increasing gabapentin from 300mg BID to TID        - Sedation plan: Precedex gtt     Pulmonary care:   # Post operative ventilatory support  # Acute hypoxic respiratory support   - Supplemental oxygen to keep saturation above 92 %  - Intubated, attempt to extubate today, with PS trial  - Vent settings: CMV/AC FiO2 weaning at 30%/450/5/20  - Potentially restart on sleep CPAP  - Incentive spirometer every 15- 30 minutes when awake.     Cardiovascular:    - Monitor hemodynamic status.       Gastroenterology/Nutrition:  - NPO, ok for ice and meds   - G to gravity, J capped   - Protonix 40mg daily  - Zofran PRN   - TF through J tube at 20mL, increase by 10mL - talk to Transplant  - Neurontin liquid 300mg BID     Renal/fluids/lytes:  - D5W LR @ 50 -> Will keep; glucose   - Urine output decreased at 1.38 mL/kg/hr   - I/O:  In: +3.9 / 1.4L (IV +3.2/ 0.8, N.3/0.2, TF 0.4/0.2, Blood 0.0/0.3)     Out: +1.9 / 0.8L (UOP: 1.7/0.8, NG 0.2/0.03, WILMA: 0.03/0.005)  . Will continue to monitor intake and output.  - Supplement w/ Neurotrophos for Phos repletion  - Armendariz in place     Endocrine:  #s/p TPIAT  - Insulin gtt/ D10 gtt  - Target range for BG , increased overnight likely secondary to lactic acidosis  - Endocrine following      ID:  #  Leukocytosis   - WBC remains at 18.9, T elevated overnight to Tmax of 102.5  - Continue to follow blood cultures  - Panc islet culture - grew Klebsiella Pneumoniae  - Zosyn x 5 days  - Diflucan fungal ppx  - Vancomycin added overnight; vanc trough     Heme:     # Acute blood loss anemia   - Hemoglobin stable at 7.0, received 1 unit overnight for drop to 6.0  - Transfuse if hgb <7.0 or signs/symptoms of hypoperfusion. Monitor and trend.      Musculoskeletal:  # Weakness and deconditioning of critical illness   - Physical and occupational therapy consults, limited by pain     Skin:  - Flexitrac to tubes      General Cares/Prophylaxis:    DVT Prophylaxis: Pneumatic Compression Devices and Hep gtt 400 (potentially switch to Lovenox) to Transplant  GI Prophylaxis: PPI  Restraints: Restraints for medical healing needed: Soft     Lines/ tubes/ drains:  - PIV, CVC, zaragoza, GJ, WILMA drain      Disposition:  - Surgical ICU.     Patient seen, findings and plan discussed with surgical ICU staff, Dr. Ingris Garcia MD  [facility], PGY1  ====================================    TODAY'S SUBJECTIVE/INTERVAL HISTORY:   Overnight the patient became progressively hypotensive and somnolent, developing increasing lactic acidosis and altered mental status; she was intubated for airway protection; post-intubation, she developed further hypotension and 1 unit(s) PRBC given for Hbg of 6.0, with Precedex for agitation, with Dilaudid weaned. The patient's family was updated by the nurse overnight.     OBJECTIVE:     Temp:  [98.2  F (36.8  C)-102.5  F (39.2  C)] 102.1  F (38.9  C)  Pulse:  [] 86  Heart Rate:  [] 86  Resp:  [0-33] 20  BP: ()/(29-87) 95/61  FiO2 (%):  [60 %-80 %] 60 %  SpO2:  [70 %-100 %] 93 %  Ventilation Mode: CMV/AC  (Continuous Mandatory Ventilation/ Assist Control)  FiO2 (%): 60 %  Rate Set (breaths/minute): 20 breaths/min  Tidal Volume Set (mL): 450 mL  PEEP (cm H2O): 5  cmH2O  Oxygen Concentration (%): 80 %  Resp: 20      I/O last 3 completed shifts:  In: 3965.64 [I.V.:3335.64; NG/GT:270]  Out: 1962 [Urine:1740; Emesis/NG output:192; Drains:30]    General/Neuro: Intubated and sedated  Neuro: Difficult to arouse this morning at bedside, limited response to stimuli  Pulm: Non-labored  Abd: soft; non-distended  Extremities: no edema  Incision site: C/d/I  Skin: warm and well-perfused.    LABS:   Arterial Blood Gases   Recent Labs   Lab 07/29/20  2004 07/27/20  1708 07/27/20  1503 07/27/20  1400   PH 7.38 7.39 7.37 7.40   PCO2 52* 37 40 39   PO2 64* 171* 143* 164*   HCO3 31* 23 23 24     Complete Blood Count   Recent Labs   Lab 07/30/20  0456 07/30/20  0030 07/29/20 2236 07/29/20  1941   WBC 18.9* 20.9* 14.3* 24.1*   HGB 7.0* 6.0* 6.1* 7.1*    227 222 269     Basic Metabolic Panel  Recent Labs   Lab 07/30/20  0456 07/29/20  2236 07/29/20  1859 07/29/20  0408    139 138 142   POTASSIUM 3.5 3.8 3.7 4.6   CHLORIDE 105 105 104 109   CO2 28 31 33* 28   BUN 8 8 8 6*   CR 0.62 0.66 0.58 0.63   * 248* 186* 154*     Liver Function Tests  Recent Labs   Lab 07/30/20  0456 07/28/20  0354 07/27/20  1915 07/23/20  0835   AST 22 59* 89* 17   ALT 28 52* 57* 36   ALKPHOS 72 39* 39* 108   BILITOTAL 0.4 0.3 0.6 0.4   ALBUMIN 1.9* 2.7* 3.0* 4.5   INR  --   --  1.68* 1.06     Pancreatic Enzymes  Recent Labs   Lab 07/28/20  0354   LIPASE 981*   AMYLASE 76     Coagulation Profile  Recent Labs   Lab 07/30/20  0340 07/29/20  0408 07/28/20  0354 07/27/20  2223 07/27/20  1915 07/23/20  0835   INR  --   --   --   --  1.68* 1.06   PTT 44* 31 34 31  --   --          IMAGING:   Recent Results (from the past 24 hour(s))   XR Chest Port 1 View    Narrative    EXAM: XR CHEST PORT 1 VW  7/29/2020 9:22 AM     HISTORY:  fUO       COMPARISON:  None    FINDINGS: Single view of the chest. Right central venous catheter with  tip near the cavoatrial junction. Trachea is midline. Heart size  appears normal.  Left lung hazy opacifications may represent  atelectasis versus infection. Obscured hemidiaphragms bilaterally. No  pneumothorax. No pneumoperitoneum.      Impression    IMPRESSION:   1. Mild to moderate bilateral pleural effusions, left greater than  right.  2. Hazy opacifications of the left lung may represent atelectasis  versus infection. Recommend follow-up to clearing.    I have personally reviewed the examination and initial interpretation  and I agree with the findings.    ALVINO LAND MD   XR Chest Port 1 View    Impression    IMPRESSION:   1.  Endotracheal tube tip projects 2.2 cm from the eveline.  2.  Stable pulmonary opacities and effusions.

## 2020-07-30 NOTE — PLAN OF CARE
Major Shift Events:   -Extremely agitated and in pain from tube @ 0800  -Was put on pressure support, given Ketamine (5mg), fell asleep and went apneic   -When pt woke up, pressure support trial was started and maintained for 45 minutes  - orders were created to extubate and pt was extubated successfully (no complications and placed on 2L nasal canula)  - CPAP was found to be necessary to prevent ANIBAL (which caused previous intubation)    Final IV meds rates  -LR D5W 50 mL/hr   -Dilaudid (0.2 mg/ push and 1.8mg per/hr)  -Insulin 1 Unit/hr    - Heparin 400 Units/hr    Plan:  -Continue to assess for the best pain relief strategy  -Continue to utilize CPAP during sleep to ensure no further invasive respiratory support is needed  -Continue to monitor IV medications (Heparin, Insulin, and PCA Dilaudid)  For vital signs and complete assessments, please see documentation flowsheets.

## 2020-07-30 NOTE — PHARMACY-VANCOMYCIN DOSING SERVICE
Pharmacy Vancomycin Initial Note  Date of Service 2020  Patient's  1965  55 year old, female    Indication: Sepsis    Current estimated CrCl = Estimated Creatinine Clearance: 97 mL/min (based on SCr of 0.66 mg/dL).    Creatinine for last 3 days  2020:  7:15 PM Creatinine 0.56 mg/dL  2020:  3:54 AM Creatinine 0.60 mg/dL  2020:  4:08 AM Creatinine 0.63 mg/dL;  6:59 PM Creatinine 0.58 mg/dL; 10:36 PM Creatinine 0.66 mg/dL    Recent Vancomycin Level(s) for last 3 days  No results found for requested labs within last 72 hours.      Vancomycin IV Administrations (past 72 hours)      No vancomycin orders with administrations in past 72 hours.                Nephrotoxins and other renal medications (From now, onward)    Start     Dose/Rate Route Frequency Ordered Stop    20 1115  vancomycin 1250 mg in 0.9% NaCl 250 mL intermittent infusion 1,250 mg      1,250 mg  over 90 Minutes Intravenous EVERY 12 HOURS 20 2306      20 2315  vancomycin 1500 mg in 0.9% NaCl 250 ml intermittent infusion 1,500 mg      1,500 mg  over 90 Minutes Intravenous ONCE 20 2306      20 0800  piperacillin-tazobactam (ZOSYN) 3.375 g vial to attach to  mL bag      3.375 g  over 30 Minutes Intravenous EVERY 6 HOURS 20 0720            Contrast Orders - past 72 hours (72h ago, onward)    None                Plan:  1.  Start vancomycin  1500 mg IV once followed by 1250 mg IV q12h.   2.  Goal Trough Level: 15-20 mg/L   3.  Pharmacy will check trough levels as appropriate in 1-3 Days.    4. Serum creatinine levels will be ordered daily for the first week of therapy and at least twice weekly for subsequent weeks.    5. [place] method utilized to dose vancomycin therapy: Method 2    Desmond Chisholm, VitorD

## 2020-07-30 NOTE — PLAN OF CARE
4A Discharge Planner PT   Patient plan for discharge: not discussed today  Current status: On 2 LPMs of O2 via NC at rest - titrated down to RA with O2 > 93% with cues for deep breathing. Pt extubated later AM/early PM. Review abdominal precautions. Able to bridge in supine to place bed pan. Supine > EOB, Min A. EOB > recliner, min-mod A. 2 x additional STSs with up to 5 minutes of standing tolerance for clean, and multi-directional stepping to progress dynamic activity, min A x 1. Limited by pain and fatigue. Recs up with A x 1-2 for line management.     Barriers to return to prior living situation: medical status, current mob, pain, deconditioning, precautions  Recommendations for discharge: Anticipate home with assist from SO  Rationale for recommendations:  Anticipate patient will be appropriate to return home from PT perspective when medically stable, will continue to follow and update as appropriate.       Entered by: Dorothea Moreno 07/30/2020 4:15 PM

## 2020-07-30 NOTE — PROGRESS NOTES
REGIONAL ANESTHESIA PAIN SERVICE CONTINUOUS NERVE INFUSION NOTE  Dottie Hernandez is a 55 year old female with history of chronic pancreatitis secondary to pancreatic divisum who is now POD #2 s/p PANCREATECTOMY, TOTAL, WITH AUTOLOGOUS PANCREATIC ISLET CELL TRANSPLANT, Splenectomy, gastrojejunostomy placement) and placement of bilateral T6-7 paravertebral (PV) catheters for postoperative pain control.    Subjective and Interval History: Overnight no acute events. On Precedex drip.  Patient seen at 0830.  Arouses easily, states nerve block bolus helped last night and is requesting bolus this AM. Reports L) greater than R) sided abdominal pain rating at 8/10, adequate control  nerve block continuous infusion, Dilaudid PCA 0.2mg/hr with 0.2mg PCA doses Q 10 min lockout, scheduled gabapentin and ketamine IV (see below).  Denies LE weakness, paresthesias, circumoral numbness, metallic taste or tinnitus. NPO currently denies nausea.    Antithrombotic/Thrombolytic Therapy ordered:    heparin 25,000 units in 0.45% NaCl 250 mL ANTICOAGULANT  infusion 200 Units/hr, IV, Continuous - Increased to 400 units/hr today         Analgesic Medications:   Medications related to Pain Management (From now, onward)    Start     Dose/Rate Route Frequency Ordered Stop    07/28/20 0800  gabapentin (NEURONTIN) solution 300 mg      300 mg Oral or J tube 2 TIMES DAILY 07/27/20 2200 07/28/20 0100  ketamine (KETALAR) injection 5-10 mg      5-10 mg  over 2-5 Minutes Intravenous EVERY 6 HOURS 07/27/20 2225 07/27/20 2216  oxyCODONE (ROXICODONE) solution 5 mg      5 mg Oral EVERY 4 HOURS PRN 07/27/20 2217 07/27/20 2200  lidocaine 1 % 0.1-1 mL      0.1-1 mL Other EVERY 1 HOUR PRN 07/27/20 2200 07/27/20 2200  lidocaine (LMX4) cream       Topical EVERY 1 HOUR PRN 07/27/20 2200 07/27/20 2200  hydrOXYzine (ATARAX) syrup 25 mg      25 mg Oral EVERY 6 HOURS PRN 07/27/20 2200 07/27/20 2200  LORazepam (ATIVAN) injection 0.5 mg       0.5 mg Intravenous EVERY 6 HOURS 07/27/20 1859 07/27/20 1915  HYDROmorphone (DILAUDID) PCA 0.2 mg/mL OPIOID TOLERANT       Intravenous CONTINUOUS 07/27/20 1900 07/27/20 1900  dexmedetomidine (PRECEDEX) 400 mcg in 0.9% sodium chloride 100 mL      0.2-0.7 mcg/kg/hr × 63.1 kg  3.2-11 mL/hr  Intravenous CONTINUOUS 07/27/20 1859 07/27/20 0930  ropivacaine 0.2% (NAROPIN) 750 mL in ON-Q C-Bloc select flow ([id number] holds 600-750 mL) dual cath disposable pump      14 mL/hr  Irrigation Continuous Nerve Block 07/27/20 0928             Objective:  Lab results  Recent Labs   Lab Test 07/29/20  0408   WBC 20.2*   RBC 2.72*   HGB 7.7*   HCT 24.7*   MCV 91   MCH 28.3   MCHC 31.2*   RDW 13.8          Lab Results   Component Value Date    INR 1.68 07/27/2020    INR 1.06 07/23/2020       Vitals:    Temp:  [36.4  C (97.6  F)-38.7  C (101.6  F)] 38.5  C (101.3  F)  Pulse:  [112-147] 141  Heart Rate:  [112-144] 138  Resp:  [10-27] 22  BP: ()/(54-87) 126/78  MAP:  [99 mmHg] 99 mmHg  Arterial Line BP: (106)/(83) 106/83  SpO2:  [70 %-100 %] 94 %    Exam:   GEN: drowsy, no distress and cooperative  NEURO/MSK: moves upper and lower extremites spontaneously, full strength and overall symmetric  SKIN: bilateral paravertebral (PV) catheter site with dressing c/d/i, no tenderness, erythema, heme, edema      Assessment and Plan:     POD #2 s/p PANCREATECTOMY, TOTAL, WITH AUTOLOGOUS PANCREATIC ISLET CELL TRANSPLANT, Splenectomy, gastrojejunostomy placement)  Patient is receiving adequate analgesia with current multimodal therapy including ROpivacaine 0.2% at 14 mL/hr, 7 mL/hr each catheter + Q 12 hr PRN bolus.  Motor function intact and adequate sensory block, is meeting activity goals.  No evidence of adverse side effects related to local anesthetic.     PLAN  - patient can be evaluated to receive local anesthetic bolus Q 12 hr PRN pain not controlled with continuous infusion.  Bedside RN must page RAPS to request  bolus  - continue ROpivacaine 0.2% infusion rate at 14 mL/hr, 7 mL/hr each catheter  - expected change of next On-Q pump is today, new OnQ ordered by RN  - antithrombotic/thrombolytic therapy okay to continue Heparin IV infusion as ordered. Please contact RAPS (#[phone]) prior to any medication changes  - will continue to follow and adjust as needed      Leonel García MD  [facility]  7/29/2020 8:39 AM    RAPS Contact Info (24 hour job code pager is the last 4 digits) For in-house use only:   Job code ID: [id number]   [place] 0599  Peds 0602  [place] phone: dial * * * [phone], enter jobcode ID, then enter call-back number.    Text: Use [employer] on the Intranet <Paging/Directory> tab and enter Jobcode ID.   If no call back at any time, contact the hospital  and ask for RAPS attending or backup

## 2020-07-30 NOTE — PROGRESS NOTES
Pre-op visit for TPIAT    Transplant Surgery Consultation     Dottie Hernandez MRN# [id number]   YOB: 1965 Age: 55 year old             Assessment and Plan:   54 yo lady with CP secondary to pancreatic divisum.  We discussed risks and benefits of the procedure, TPIAT.  Patient elected to proceed and signed the consent.               Chief Complaint:   Chronic Pancreatitis       History of Present Illness:   54 yo lady with CP secondary to pancreatic divisum.  Prior open sphincter of Oddi exploration, [place] x2.  H/o ANIBAL.  Currently, in usual state of health, on chronic narcotics.  Denies chills/fevers, n/v, CP/SOB.            Past Medical History:     Past Medical History:   Diagnosis Date     Allergies      Chronic pancreatitis (H)      Recurrent acute pancreatitis              Past Surgical History:     Past Surgical History:   Procedure Laterality Date     APPENDECTOMY       BIOPSY LIVER N/A 7/27/2020    Procedure: Biopsy liver;  Surgeon: Steve Jarvis MD;  Location: [facility] OR     LAPAROSCOPIC PANCREATECTOMY, TRANSPLANT AUTO ISLET CELL N/A 7/27/2020    Procedure: Laproscopipc PANCREATECTOMY, TOTAL, WITH AUTOLOGOUS PANCREATIC ISLET CELL TRANSPLANT, Splenectomy, gastrojejunostomy placement;  Surgeon: Steve Jarvis MD;  Location: [facility] OR     LAPAROTOMY EXPLORATORY      approximately 2002, during pregnancy for pain     open sphincteroplasty/sphincterotomy  04/2011     PANCREATECTOMY [place]  07/2011     PANCREATECTOMY [place]  01/2019    revision                Social History:     Social History     Tobacco Use     Smoking status: Never Smoker     Smokeless tobacco: Never Used   Substance Use Topics     Alcohol use: Not Currently             Family History:     Family History   Problem Relation Age of Onset     Lymphoma Mother         non hodgkins lymphoma     Coronary Artery Disease Mother      Coronary Artery Disease Father      Diabetes No family hx of      Pancreatitis No family hx of       Anesthesia Reaction No family hx of      Deep Vein Thrombosis (DVT) No family hx of                   Allergies:   No Known Allergies          Medications:     No current facility-administered medications for this visit.      No current outpatient medications on file.     Facility-Administered Medications Ordered in Other Visits   Medication     - MEDICATION INSTRUCTIONS -     acetaminophen (TYLENOL) tablet 650 mg     dexmedetomidine (PRECEDEX) 400 mcg in 0.9% sodium chloride 100 mL     dextrose 10% infusion     dextrose 10% infusion     dextrose 5% in lactated ringers infusion     glucose gel 15-30 g    Or     dextrose 50 % injection 25-50 mL    Or     glucagon injection 1 mg     fluconazole (DIFLUCAN) intermittent infusion 400 mg in NaCl     gabapentin (NEURONTIN) solution 300 mg     heparin 25,000 units in 0.45% NaCl 250 mL ANTICOAGULANT  infusion     HYDROmorphone (DILAUDID) PCA 0.2 mg/mL OPIOID TOLERANT     hydrOXYzine (ATARAX) syrup 25 mg     insulin 1 unit/1mL in saline (NovoLIN-Regular) infusion- SOT TPIAT algorithm     ketorolac (TORADOL) injection 15 mg     lactated ringers BOLUS 250 mL     lidocaine (LMX4) cream     lidocaine 1 % 0.1-1 mL     magnesium sulfate 2 g in water intermittent infusion     magnesium sulfate 4 g in 100 mL sterile water (premade)     multivitamins w/minerals (CERTAVITE) liquid 15 mL     naloxone (NARCAN) injection 0.1-0.4 mg     No Anticoagulation unless approved by Anesthesia Provider.     ondansetron (ZOFRAN-ODT) ODT tab 4 mg    Or     ondansetron (ZOFRAN) injection 4 mg     pantoprazole (PROTONIX) 2 mg/mL suspension 40 mg     piperacillin-tazobactam (ZOSYN) 3.375 g vial to attach to  mL bag     potassium & sodium phosphates (NEUTRA-PHOS) Packet 1 packet     potassium chloride (KLOR-CON) Packet 20-40 mEq     potassium chloride 10 mEq in 100 mL intermittent infusion with 10 mg lidocaine     potassium chloride 10 mEq in 100 mL sterile water intermittent infusion (premix)      potassium chloride 20 mEq in 50 mL intermittent infusion     potassium chloride ER (KLOR-CON M) CR tablet 20-40 mEq     prochlorperazine (COMPAZINE) injection 10 mg    Or     prochlorperazine (COMPAZINE) tablet 10 mg     ropivacaine 0.2% (NAROPIN) 750 mL in ON-Q C-Bloc select flow ([id number] holds 600-750 mL) dual cath disposable pump     sodium chloride (PF) 0.9% PF flush 3 mL     sodium chloride (PF) 0.9% PF flush 3 mL     sodium phosphate 10 mmol in D5W intermittent infusion     sodium phosphate 15 mmol in D5W intermittent infusion     sodium phosphate 20 mmol in D5W intermittent infusion     sodium phosphate 25 mmol in D5W intermittent infusion     vancomycin 1250 mg in 0.9% NaCl 250 mL intermittent infusion 1,250 mg               Review of Systems:   The 10 point Review of Systems is negative other than noted in the HPI           Physical Exam:   Physical Exam  Constitutional:       Appearance: Normal appearance. She is normal weight.   HENT:      Head: Normocephalic and atraumatic.      Nose: Nose normal.   Eyes:      Extraocular Movements: Extraocular movements intact.      Conjunctiva/sclera: Conjunctivae normal.      Pupils: Pupils are equal, round, and reactive to light.   Neck:      Musculoskeletal: Normal range of motion and neck supple.   Cardiovascular:      Rate and Rhythm: Normal rate.      Pulses: Normal pulses.   Pulmonary:      Effort: Pulmonary effort is normal.      Breath sounds: Normal breath sounds.   Abdominal:      General: Bowel sounds are normal.      Palpations: Abdomen is soft.   Musculoskeletal: Normal range of motion.   Skin:     General: Skin is warm and dry.      Capillary Refill: Capillary refill takes 2 to 3 seconds.   Neurological:      General: No focal deficit present.      Mental Status: She is alert and oriented to person, place, and time.   Psychiatric:         Mood and Affect: Mood normal.         Behavior: Behavior normal.               Data:   CT: no replaced right  hepatic    Labs: reviewed in EPIC

## 2020-07-30 NOTE — PLAN OF CARE
Major Shift Events:  pt became somnolent ~2200 and required intubation. Dex restarted for comfort. Dilaudid stopped. 1 unit(s) PRBC given for Hbg of 6.0. Insulin 0-7 unit(s) overnight.   Plan: Extubate today. Start CPAP when pt falls asleep. Continue to monitor pt for acute changes.   For vital signs and complete assessments, please see documentation flowsheets.

## 2020-07-30 NOTE — PROGRESS NOTES
Assisted with endotracheal intubation for respiratory failure/airway protection. A #7.5 ETT was placed and secured at 22 cm @ lip. Positive color change noted with CO2 detector, breath sounds were clear. Neck positioning aid removed. Patient placed on full vent support 20/450/+5/100, labs and CXR pending.    Dennis Cárdenas, RT, RT  7/29/2020 10:29 PM

## 2020-07-30 NOTE — PROGRESS NOTES
[facility], [place]  Procedure Note          Extubation:       Dottie Hernandez  MRN# [id number]   July 30, 2020, 11:00 AM         Patient extubated at: July 30, 2020, 11:00 AM   Supplemental Oxygen: Via nasal cannula at 2 liters per minute   Cough: The cough is strong   Secretion Mode: Able to clear   Secretion Amount: Small amount, moderately thin and clear in color   Respiratory Exam:: Breath sounds: equal and clear     Location: all lobes   Skin Exam:: Patient color: natural   Patient Status: Currently appears comfortable             Recorded by Daphne Lyles

## 2020-07-31 ENCOUNTER — APPOINTMENT (OUTPATIENT)
Dept: OCCUPATIONAL THERAPY | Facility: CLINIC | Age: 55
End: 2020-07-31
Attending: PHYSICIAN ASSISTANT
Payer: COMMERCIAL

## 2020-07-31 ENCOUNTER — APPOINTMENT (OUTPATIENT)
Dept: PHYSICAL THERAPY | Facility: CLINIC | Age: 55
End: 2020-07-31
Attending: TRANSPLANT SURGERY
Payer: COMMERCIAL

## 2020-07-31 ENCOUNTER — APPOINTMENT (OUTPATIENT)
Dept: GENERAL RADIOLOGY | Facility: CLINIC | Age: 55
End: 2020-07-31
Attending: PHYSICIAN ASSISTANT
Payer: COMMERCIAL

## 2020-07-31 LAB
ANION GAP SERPL CALCULATED.3IONS-SCNC: 4 MMOL/L (ref 3–14)
APTT PPP: 37 SEC (ref 22–37)
BASOPHILS # BLD AUTO: 0.1 10E9/L (ref 0–0.2)
BASOPHILS NFR BLD AUTO: 0.3 %
BLD PROD TYP BPU: NORMAL
BLD UNIT ID BPU: 0
BLOOD PRODUCT CODE: NORMAL
BPU ID: NORMAL
BUN SERPL-MCNC: 5 MG/DL (ref 7–30)
CALCIUM SERPL-MCNC: 7.6 MG/DL (ref 8.5–10.1)
CHLORIDE SERPL-SCNC: 109 MMOL/L (ref 94–109)
CO2 SERPL-SCNC: 31 MMOL/L (ref 20–32)
CREAT SERPL-MCNC: 0.48 MG/DL (ref 0.52–1.04)
DIFFERENTIAL METHOD BLD: ABNORMAL
EOSINOPHIL # BLD AUTO: 1.3 10E9/L (ref 0–0.7)
EOSINOPHIL NFR BLD AUTO: 8.1 %
ERYTHROCYTE [DISTWIDTH] IN BLOOD BY AUTOMATED COUNT: 14.1 % (ref 10–15)
GFR SERPL CREATININE-BSD FRML MDRD: >90 ML/MIN/{1.73_M2}
GLUCOSE BLDC GLUCOMTR-MCNC: 103 MG/DL (ref 70–99)
GLUCOSE BLDC GLUCOMTR-MCNC: 105 MG/DL (ref 70–99)
GLUCOSE BLDC GLUCOMTR-MCNC: 106 MG/DL (ref 70–99)
GLUCOSE BLDC GLUCOMTR-MCNC: 117 MG/DL (ref 70–99)
GLUCOSE BLDC GLUCOMTR-MCNC: 126 MG/DL (ref 70–99)
GLUCOSE BLDC GLUCOMTR-MCNC: 134 MG/DL (ref 70–99)
GLUCOSE BLDC GLUCOMTR-MCNC: 134 MG/DL (ref 70–99)
GLUCOSE BLDC GLUCOMTR-MCNC: 135 MG/DL (ref 70–99)
GLUCOSE BLDC GLUCOMTR-MCNC: 144 MG/DL (ref 70–99)
GLUCOSE BLDC GLUCOMTR-MCNC: 159 MG/DL (ref 70–99)
GLUCOSE BLDC GLUCOMTR-MCNC: 175 MG/DL (ref 70–99)
GLUCOSE BLDC GLUCOMTR-MCNC: 182 MG/DL (ref 70–99)
GLUCOSE BLDC GLUCOMTR-MCNC: 208 MG/DL (ref 70–99)
GLUCOSE BLDC GLUCOMTR-MCNC: 76 MG/DL (ref 70–99)
GLUCOSE BLDC GLUCOMTR-MCNC: 85 MG/DL (ref 70–99)
GLUCOSE BLDC GLUCOMTR-MCNC: 85 MG/DL (ref 70–99)
GLUCOSE BLDC GLUCOMTR-MCNC: 87 MG/DL (ref 70–99)
GLUCOSE BLDC GLUCOMTR-MCNC: 94 MG/DL (ref 70–99)
GLUCOSE BLDC GLUCOMTR-MCNC: 97 MG/DL (ref 70–99)
GLUCOSE SERPL-MCNC: 175 MG/DL (ref 70–99)
HCT VFR BLD AUTO: 24.6 % (ref 35–47)
HGB BLD-MCNC: 7.9 G/DL (ref 11.7–15.7)
IMM GRANULOCYTES # BLD: 0.1 10E9/L (ref 0–0.4)
IMM GRANULOCYTES NFR BLD: 0.6 %
LYMPHOCYTES # BLD AUTO: 2.5 10E9/L (ref 0.8–5.3)
LYMPHOCYTES NFR BLD AUTO: 16 %
MAGNESIUM SERPL-MCNC: 1.9 MG/DL (ref 1.6–2.3)
MCH RBC QN AUTO: 28.6 PG (ref 26.5–33)
MCHC RBC AUTO-ENTMCNC: 32.1 G/DL (ref 31.5–36.5)
MCV RBC AUTO: 89 FL (ref 78–100)
MONOCYTES # BLD AUTO: 1.2 10E9/L (ref 0–1.3)
MONOCYTES NFR BLD AUTO: 7.3 %
NEUTROPHILS # BLD AUTO: 10.7 10E9/L (ref 1.6–8.3)
NEUTROPHILS NFR BLD AUTO: 67.7 %
NRBC # BLD AUTO: 0.1 10*3/UL
NRBC BLD AUTO-RTO: 1 /100
PHOSPHATE SERPL-MCNC: 2.7 MG/DL (ref 2.5–4.5)
PLATELET # BLD AUTO: 350 10E9/L (ref 150–450)
POTASSIUM SERPL-SCNC: 3.2 MMOL/L (ref 3.4–5.3)
POTASSIUM SERPL-SCNC: 3.4 MMOL/L (ref 3.4–5.3)
POTASSIUM SERPL-SCNC: 3.5 MMOL/L (ref 3.4–5.3)
RBC # BLD AUTO: 2.76 10E12/L (ref 3.8–5.2)
SODIUM SERPL-SCNC: 144 MMOL/L (ref 133–144)
TRANSFUSION STATUS PATIENT QL: NORMAL
TRANSFUSION STATUS PATIENT QL: NORMAL
WBC # BLD AUTO: 15.7 10E9/L (ref 4–11)

## 2020-07-31 PROCEDURE — 83735 ASSAY OF MAGNESIUM: CPT | Performed by: PHYSICIAN ASSISTANT

## 2020-07-31 PROCEDURE — 20000004 ZZH R&B ICU UMMC

## 2020-07-31 PROCEDURE — 97530 THERAPEUTIC ACTIVITIES: CPT | Mod: GP

## 2020-07-31 PROCEDURE — 25000132 ZZH RX MED GY IP 250 OP 250 PS 637

## 2020-07-31 PROCEDURE — 25000125 ZZHC RX 250

## 2020-07-31 PROCEDURE — 97535 SELF CARE MNGMENT TRAINING: CPT | Mod: GO

## 2020-07-31 PROCEDURE — 25000132 ZZH RX MED GY IP 250 OP 250 PS 637: Performed by: TRANSPLANT SURGERY

## 2020-07-31 PROCEDURE — 27110038 ZZH RX 271

## 2020-07-31 PROCEDURE — 25000125 ZZHC RX 250: Performed by: NURSE PRACTITIONER

## 2020-07-31 PROCEDURE — 40000196 ZZH STATISTIC RAPCV CVP MONITORING

## 2020-07-31 PROCEDURE — 00000146 ZZHCL STATISTIC GLUCOSE BY METER IP

## 2020-07-31 PROCEDURE — 25000128 H RX IP 250 OP 636: Performed by: STUDENT IN AN ORGANIZED HEALTH CARE EDUCATION/TRAINING PROGRAM

## 2020-07-31 PROCEDURE — 40000275 ZZH STATISTIC RCP TIME EA 10 MIN

## 2020-07-31 PROCEDURE — 85025 COMPLETE CBC W/AUTO DIFF WBC: CPT | Performed by: PHYSICIAN ASSISTANT

## 2020-07-31 PROCEDURE — 40000986 XR ABDOMEN PORT 1 VW

## 2020-07-31 PROCEDURE — 25000128 H RX IP 250 OP 636: Performed by: PHYSICIAN ASSISTANT

## 2020-07-31 PROCEDURE — 25800030 ZZH RX IP 258 OP 636: Performed by: PHYSICIAN ASSISTANT

## 2020-07-31 PROCEDURE — 99207 ZZC CDG-MDM COMPONENT: MEETS MODERATE - UP CODED: CPT | Performed by: ANESTHESIOLOGY

## 2020-07-31 PROCEDURE — 25000132 ZZH RX MED GY IP 250 OP 250 PS 637: Performed by: PHYSICIAN ASSISTANT

## 2020-07-31 PROCEDURE — 25000125 ZZHC RX 250: Performed by: PHYSICIAN ASSISTANT

## 2020-07-31 PROCEDURE — 84132 ASSAY OF SERUM POTASSIUM: CPT | Performed by: PHYSICIAN ASSISTANT

## 2020-07-31 PROCEDURE — 84100 ASSAY OF PHOSPHORUS: CPT | Performed by: PHYSICIAN ASSISTANT

## 2020-07-31 PROCEDURE — 85730 THROMBOPLASTIN TIME PARTIAL: CPT | Performed by: PHYSICIAN ASSISTANT

## 2020-07-31 PROCEDURE — 97165 OT EVAL LOW COMPLEX 30 MIN: CPT | Mod: GO

## 2020-07-31 PROCEDURE — 94660 CPAP INITIATION&MGMT: CPT

## 2020-07-31 PROCEDURE — 97116 GAIT TRAINING THERAPY: CPT | Mod: GP

## 2020-07-31 PROCEDURE — 27210437 ZZH NUTRITION PRODUCT SEMIELEM INTERMED LITER

## 2020-07-31 PROCEDURE — 99231 SBSQ HOSP IP/OBS SF/LOW 25: CPT | Mod: GC | Performed by: SURGERY

## 2020-07-31 PROCEDURE — 99233 SBSQ HOSP IP/OBS HIGH 50: CPT | Performed by: ANESTHESIOLOGY

## 2020-07-31 PROCEDURE — 80048 BASIC METABOLIC PNL TOTAL CA: CPT | Performed by: PHYSICIAN ASSISTANT

## 2020-07-31 RX ORDER — FUROSEMIDE 10 MG/ML
5 INJECTION INTRAMUSCULAR; INTRAVENOUS ONCE
Status: COMPLETED | OUTPATIENT
Start: 2020-07-31 | End: 2020-07-31

## 2020-07-31 RX ORDER — HYDROMORPHONE HCL/0.9% NACL/PF 0.2MG/0.2
0.2 SYRINGE (ML) INTRAVENOUS ONCE
Status: COMPLETED | OUTPATIENT
Start: 2020-07-31 | End: 2020-07-31

## 2020-07-31 RX ORDER — METHOCARBAMOL 100 MG/ML
500 INJECTION, SOLUTION INTRAMUSCULAR; INTRAVENOUS EVERY 6 HOURS
Status: COMPLETED | OUTPATIENT
Start: 2020-07-31 | End: 2020-08-03

## 2020-07-31 RX ORDER — BUPIVACAINE HYDROCHLORIDE 5 MG/ML
5 INJECTION, SOLUTION EPIDURAL; INTRACAUDAL ONCE
Status: COMPLETED | OUTPATIENT
Start: 2020-07-31 | End: 2020-07-31

## 2020-07-31 RX ADMIN — SODIUM CHLORIDE, SODIUM LACTATE, POTASSIUM CHLORIDE, CALCIUM CHLORIDE AND DEXTROSE MONOHYDRATE: 5; 600; 310; 30; 20 INJECTION, SOLUTION INTRAVENOUS at 08:35

## 2020-07-31 RX ADMIN — METHOCARBAMOL 500 MG: 100 INJECTION, SOLUTION INTRAMUSCULAR; INTRAVENOUS at 17:59

## 2020-07-31 RX ADMIN — POTASSIUM & SODIUM PHOSPHATES POWDER PACK 280-160-250 MG 1 PACKET: 280-160-250 PACK at 18:00

## 2020-07-31 RX ADMIN — POTASSIUM CHLORIDE 20 MEQ: 1.5 POWDER, FOR SOLUTION ORAL at 08:36

## 2020-07-31 RX ADMIN — METHOCARBAMOL 500 MG: 100 INJECTION, SOLUTION INTRAMUSCULAR; INTRAVENOUS at 12:02

## 2020-07-31 RX ADMIN — ACETAMINOPHEN 650 MG: 325 SOLUTION ORAL at 18:00

## 2020-07-31 RX ADMIN — KETOROLAC TROMETHAMINE 15 MG: 15 INJECTION, SOLUTION INTRAMUSCULAR; INTRAVENOUS at 05:59

## 2020-07-31 RX ADMIN — POTASSIUM CHLORIDE 40 MEQ: 1.5 POWDER, FOR SOLUTION ORAL at 06:36

## 2020-07-31 RX ADMIN — KETOROLAC TROMETHAMINE 15 MG: 15 INJECTION, SOLUTION INTRAMUSCULAR; INTRAVENOUS at 17:59

## 2020-07-31 RX ADMIN — ACETAMINOPHEN 650 MG: 325 SOLUTION ORAL at 11:51

## 2020-07-31 RX ADMIN — HUMAN INSULIN 2 UNITS/HR: 100 INJECTION, SOLUTION SUBCUTANEOUS at 05:59

## 2020-07-31 RX ADMIN — GABAPENTIN 300 MG: 250 SUSPENSION ORAL at 21:54

## 2020-07-31 RX ADMIN — PIPERACILLIN AND TAZOBACTAM 3.38 G: 3; .375 INJECTION, POWDER, FOR SOLUTION INTRAVENOUS at 13:52

## 2020-07-31 RX ADMIN — GABAPENTIN 300 MG: 250 SUSPENSION ORAL at 13:10

## 2020-07-31 RX ADMIN — PIPERACILLIN AND TAZOBACTAM 3.38 G: 3; .375 INJECTION, POWDER, FOR SOLUTION INTRAVENOUS at 08:37

## 2020-07-31 RX ADMIN — FLUCONAZOLE IN SODIUM CHLORIDE 400 MG: 2 INJECTION, SOLUTION INTRAVENOUS at 05:12

## 2020-07-31 RX ADMIN — PIPERACILLIN AND TAZOBACTAM 3.38 G: 3; .375 INJECTION, POWDER, FOR SOLUTION INTRAVENOUS at 01:56

## 2020-07-31 RX ADMIN — PIPERACILLIN AND TAZOBACTAM 3.38 G: 3; .375 INJECTION, POWDER, FOR SOLUTION INTRAVENOUS at 20:39

## 2020-07-31 RX ADMIN — Medication 0.2 MG: at 03:03

## 2020-07-31 RX ADMIN — POTASSIUM & SODIUM PHOSPHATES POWDER PACK 280-160-250 MG 1 PACKET: 280-160-250 PACK at 20:40

## 2020-07-31 RX ADMIN — POTASSIUM CHLORIDE 20 MEQ: 1.5 POWDER, FOR SOLUTION ORAL at 20:49

## 2020-07-31 RX ADMIN — HYDROXYZINE HYDROCHLORIDE 25 MG: 10 SYRUP ORAL at 02:19

## 2020-07-31 RX ADMIN — HEPARIN SODIUM 400 UNITS/HR: 10000 INJECTION, SOLUTION INTRAVENOUS at 02:34

## 2020-07-31 RX ADMIN — POTASSIUM & SODIUM PHOSPHATES POWDER PACK 280-160-250 MG 1 PACKET: 280-160-250 PACK at 08:36

## 2020-07-31 RX ADMIN — FUROSEMIDE 5 MG: 10 INJECTION, SOLUTION INTRAMUSCULAR; INTRAVENOUS at 13:10

## 2020-07-31 RX ADMIN — POTASSIUM CHLORIDE 20 MEQ: 1.5 POWDER, FOR SOLUTION ORAL at 13:09

## 2020-07-31 RX ADMIN — Medication 40 MG: at 08:41

## 2020-07-31 RX ADMIN — GABAPENTIN 300 MG: 250 SUSPENSION ORAL at 05:12

## 2020-07-31 RX ADMIN — HUMAN INSULIN 3 UNITS/HR: 100 INJECTION, SOLUTION SUBCUTANEOUS at 22:29

## 2020-07-31 RX ADMIN — BUPIVACAINE HYDROCHLORIDE 25 MG: 5 INJECTION, SOLUTION EPIDURAL; INTRACAUDAL at 09:45

## 2020-07-31 RX ADMIN — SODIUM PHOSPHATE, MONOBASIC, MONOHYDRATE AND SODIUM PHOSPHATE, DIBASIC, ANHYDROUS 10 MMOL: 276; 142 INJECTION, SOLUTION INTRAVENOUS at 09:27

## 2020-07-31 RX ADMIN — KETOROLAC TROMETHAMINE 15 MG: 15 INJECTION, SOLUTION INTRAMUSCULAR; INTRAVENOUS at 14:34

## 2020-07-31 RX ADMIN — POTASSIUM & SODIUM PHOSPHATES POWDER PACK 280-160-250 MG 1 PACKET: 280-160-250 PACK at 11:51

## 2020-07-31 RX ADMIN — MULTIVITAMIN 15 ML: LIQUID ORAL at 08:36

## 2020-07-31 RX ADMIN — Medication: at 18:16

## 2020-07-31 ASSESSMENT — PAIN DESCRIPTION - DESCRIPTORS
DESCRIPTORS: ACHING
DESCRIPTORS: ACHING;CONSTANT
DESCRIPTORS: ACHING
DESCRIPTORS: ACHING;CONSTANT

## 2020-07-31 ASSESSMENT — ACTIVITIES OF DAILY LIVING (ADL)
ADLS_ACUITY_SCORE: 14

## 2020-07-31 ASSESSMENT — MIFFLIN-ST. JEOR: SCORE: 1323.02

## 2020-07-31 NOTE — PROGRESS NOTES
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 Jarvis 7/27/2020.   She received 1116 islet equivalents per kg body weight.  Re-intubated 7/29 due to somnolence. Now extubated 7/30       total pancreatectomy with auto-islet cell transplant : A1C 6% on (7/23/2020) indicating pre-diabetes most likely 2/2 chronic pancreatitis     Plan  - continue on IV insulin, will transition once tub feeds are at goal, dextrose fluids are discontinued and tolerating the tube feeds  -D5LR at 10 ml/hour  -goal glucose   -hypoglycemia protocol  -will monitor peripherally until patient is on 7A                          Outpatient diabetes follow up: TBD             Interval History:   The last 24 hours progress and nursing notes reviewed.    Review of chart only  Continues on IV insulin as tube feeds are increased to goal . Tube feeds at 40 ml/hour at ~ 1200  Dextrose fluids decreased to 10 ml/hour  IV rates 0.5- 2 units per hour    Is receiving medications in solution ( gabapentin, multivitamins, and prn Phos replacement)    Nutrition:   Impact peptide 1.5 via jejunostomy  at 40 ml/hour, goal 50 ml/hour  - D5LR     Recent Labs   Lab 07/31/20  1156 07/31/20  1034 07/31/20  0932 07/31/20  0834 07/31/20  0559 07/31/20  0516 07/31/20  0416  07/30/20  0456  07/29/20  2236  07/29/20  1859  07/29/20  0408  07/28/20  0354   GLC  --   --   --   --   --   --  175*  --  168*  --  248*  --  186*  --  154*  --  107*   * 134* 97 85 117* 126* 175*   < >  --    < >  --    < >  --    < >  --    < >  --     < > = values in this interval not displayed.               Review of Systems:   See interval hx          Medications:     None    /89   Pulse 110   Temp 98.7  F (37.1  C) (Oral)   Resp 9   Ht 1.651 m (5' 5\")   Wt 71.7 kg (158 lb " "1.6 oz)   SpO2 99%   BMI 26.31 kg/m         /89   Pulse 110   Temp 98.7  F (37.1  C) (Oral)   Resp 9   Ht 1.651 m (5' 5\")   Wt 71.7 kg (158 lb 1.6 oz)   SpO2 99%   BMI 26.31 kg/m             Data:     Lab Results   Component Value Date    A1C 6.0 07/23/2020              CBC RESULTS:   Recent Labs   Lab Test 07/29/20  0408   WBC 20.2*   RBC 2.72*   HGB 7.7*   HCT 24.7*   MCV 91   MCH 28.3   MCHC 31.2*   RDW 13.8        Recent Labs   Lab Test 07/29/20  0408 07/28/20  2204  07/28/20  0354     --   --  143   POTASSIUM 4.6 3.9   < > 3.3*   CHLORIDE 109  --   --  112*   CO2 28  --   --  27   ANIONGAP 5  --   --  4   *  --   --  107*   BUN 6*  --   --  7   CR 0.63  --   --  0.60   MONICA 8.1*  --   --  7.7*    < > = values in this interval not displayed.     Liver Function Studies -   Recent Labs   Lab Test 07/28/20  0354   PROTTOTAL 4.3*   ALBUMIN 2.7*   BILITOTAL 0.3   ALKPHOS 39*   AST 59*   ALT 52*     Lab Results   Component Value Date    INR 1.68 07/27/2020    INR 1.06 07/23/2020         Alicia Bolden [phone]  Diabetes Management job code [id number]            "

## 2020-07-31 NOTE — PROGRESS NOTES
"Pancreatitis Service - Daily Progress Note  07/31/2020    Assessment & Plan: Dottie Hernandez is a 54 year old female with a past medical history significant for chronic pancreatitis that began 20 years ago attributed to pancreatic divisum, seasonal allergies, migraines, and ANIBAL. She is now s/p TPAIT and splenectomy with stent placement with Dr. Jarvis on 7/27/20.     Cardiorespiratory:   Hypercapnic respiratory failure: Reintubated overnight, likely 2/2 meds and likely underlying sleep apnea. . Extubated again 7/30/20. Plan to utilize CPAP at night. CXR 7/29 shows \"Mild to moderate bilateral pleural effusions, left greater than right. 2. Hazy opacifications of the left lung may represent atelectasis versus infection. Recommend follow-up to clearing.\" Stopped IVF. Continue pulmonary hygiene.  Sleep Apnea: Pt states she was just diagnosed with sleep apnea prior to surgery, was not yet using CPAP. Plan to utilize CPAP at night.  Heme:  Acute blood loss anemia: EBL intra-op 500mL; received 1unit pRBC. Transfusion 7/29 overnight due to hgb of 6. Hgb now trending up, likely representative of fluid shifts.  GI/Nutrition: NG removed. Keep G-tube to gravity, 80 out yesterday. Output looked slighlty like tube feeds this AM, will get XR to assess positioning of J tube. Tube feeds per J-tube advanced to 40mL/hr 7/31. Ok to advance by 10 mL/hr every day to goal of 50 mL/hr. Continue multivitamin.  Fluid/Electrolytes:  Will d'c D5LR; electrolyte replacements per protocol. Give lasix 5 mg IV.  : Zaragoza to remain due to strict I&O- will plan for zaragoza removal tomorrow  Post-pancreatectomy diabetes: BG  with insulin drip at 0.5-4 units/hr, appreciate Endocrine consult.  Infection: Afebrile;   Leukocytosis: WBC 15.7 (18.9), s/p splenectomy. Continue to monitor  Positive islet culture: growing kleb pneumo, continue zosyn, planning for 5 days.   SIRS: Tm 102.5 7/30, now afebrile. Hypotensive at that time, given fluid boluses " and started on empiric vanco in addition to zosyn. Lactate elevated at 2.2. Blood cultures with no growth to date. Will stop vanco and fluconazole today.  Prophylaxis: PPI (Protonix), Anticoagulation: heparin drip to 400 units/hr  Pain control: fair, RAPS team following. Current regimen:  -Precedex drip off  -On-Q continuous paravertebral blocks  -Dilaudid PCA 0.2mg Q10min, continuous rate stopped due to oversedation/hypercapnia  -Will add robaxin 500 mg IV every 6 hours  -Toradol 15 mg every 6 hours  -Will schedule tylenol down the J tube  -gabapentin 300mg TID  -ketamine now stopped due to sedation  -lorazepam now stopped due to sedation  -hydroxyzine 25mg Q6H PRN  Activity: as tolerated; PT/OT ordered  Anticipated LOS/Discharge: Will transfer to     Medical Decision Making: High  Subsequent visit 83062 (high level decision making)    ANGELO/Fellow/Resident Provider: Anayeli Lyles PA-C [id number]    Faculty: Steve Jarvis MD  __________________________________________________________________  Transplant History: Admitted 7/27/2020 for TPAIT  7/27/2020 (Islet), Postoperative day: 4     Interval History: History is obtained from the patient  Much more awake and alert today. Starting having bowel movements overnight, urgent and watery. States pain control is suboptimal, she can breath through it as long as she doesn't need to move.    ROS:   A 10-point review of systems was negative except as noted above.    Curent Meds:    acetaminophen  650 mg Per J Tube Q6H     furosemide  5 mg Intravenous Once     gabapentin  300 mg Oral or J tube Q8H JENNIFER     ketorolac  15 mg Intravenous Q6H     lactated ringers  250 mL Intravenous Once     methocarbamol  500 mg Intravenous Q6H     multivitamins w/minerals  15 mL Oral or Feeding Tube Daily     pantoprazole  40 mg Oral or J tube Daily     piperacillin-tazobactam  3.375 g Intravenous Q6H     potassium & sodium phosphates  1 packet Per J Tube 4x Daily     disposable pump w/  "anesthetic (select flow)   Irrigation Continuous Nerve Block     sodium chloride (PF)  3 mL Intracatheter Q8H       Physical Exam:     Admit Weight: 63.1 kg (139 lb 1.8 oz)    Current Vitals:   /89   Pulse 110   Temp 98.7  F (37.1  C) (Oral)   Resp 9   Ht 1.651 m (5' 5\")   Wt 71.7 kg (158 lb 1.6 oz)   SpO2 99%   BMI 26.31 kg/m      CVP (mmHg): 7 mmHg    Vital sign ranges:    Temp:  [97.9  F (36.6  C)-98.7  F (37.1  C)] 98.7  F (37.1  C)  Pulse:  [] 110  Heart Rate:  [] 111  Resp:  [8-23] 9  BP: (111-159)/() 139/89  SpO2:  [93 %-100 %] 99 %  Patient Vitals for the past 24 hrs:   BP Temp Temp src Pulse Heart Rate Resp SpO2 Weight   07/31/20 1200 -- -- -- -- 111 9 99 % --   07/31/20 1100 139/89 -- -- 110 110 8 98 % --   07/31/20 1045 (!) 134/95 -- -- 110 112 11 99 % --   07/31/20 1035 (!) 135/93 -- -- 114 113 19 -- --   07/31/20 1030 (!) 159/102 -- -- 116 114 17 -- --   07/31/20 1025 (!) 143/97 -- -- 112 113 13 -- --   07/31/20 1020 (!) 144/97 -- -- 120 121 9 -- --   07/31/20 1015 (!) 144/94 -- -- 112 113 16 -- --   07/31/20 1000 (!) 141/99 -- -- 114 116 16 98 % --   07/31/20 0955 (!) 144/86 -- -- 113 114 23 -- --   07/31/20 0900 (!) 144/91 98.7  F (37.1  C) Oral 111 112 14 93 % --   07/31/20 0800 (!) 129/95 -- -- 96 93 12 98 % --   07/31/20 0700 (!) 143/87 -- -- 99 103 15 100 % --   07/31/20 0600 (!) 142/89 -- -- 102 103 12 100 % --   07/31/20 0500 (!) 142/90 -- -- 103 108 14 97 % --   07/31/20 0400 (!) 138/97 97.9  F (36.6  C) Oral 107 103 10 99 % --   07/31/20 0300 (!) 123/92 -- -- 102 101 10 100 % --   07/31/20 0200 (!) 142/86 -- -- 101 101 17 98 % --   07/31/20 0100 133/81 -- -- 102 102 12 98 % --   07/31/20 0000 (!) 146/92 98.4  F (36.9  C) Oral 100 103 10 99 % 71.7 kg (158 lb 1.6 oz)   07/30/20 2300 (!) 140/89 -- -- 101 101 11 98 % --   07/30/20 2200 (!) 140/88 -- -- -- 107 16 98 % --   07/30/20 2100 137/89 -- -- 103 105 13 96 % --   07/30/20 2000 (!) 140/88 97.9  F (36.6  C) Oral " 99 100 12 95 % --   07/30/20 1900 132/85 -- -- 98 99 8 100 % --   07/30/20 1800 (!) 131/104 -- -- 103 99 16 95 % --   07/30/20 1700 (!) 133/95 -- -- 96 97 13 93 % --   07/30/20 1600 -- 98  F (36.7  C) Oral 101 101 14 93 % --   07/30/20 1500 111/84 -- -- 89 91 16 99 % --   07/30/20 1400 138/89 -- -- 93 97 15 99 % --   07/30/20 1300 (!) 129/94 -- -- 96 94 13 94 % --     General Appearance: no acute distress  Skin: normal, warm, dry, no suspicious lesions or rashes  HEENT: G tube to gravity drainage, scant output, though light tan in appearance  Heart: regular rate and rhythm  Lungs: nonlabored resps on   Abdomen: The abdomen is rounded, soft and mildly tender to palpation. The wound is stapled, c/d/i. G tube to gravity. WILMA: serosanguinous  : zaragoza is present- clear yellow output  Extremities: edema: present generalized.     Data:   CMP  Recent Labs   Lab 07/31/20  0416 07/30/20  0456  07/28/20  0354  07/27/20  1708 07/27/20  1503    139   < > 143   < > 141 140   POTASSIUM 3.2* 3.5   < > 3.3*   < > 3.7 4.2   CHLORIDE 109 105   < > 112*   < >  --   --    CO2 31 28   < > 27   < >  --   --    * 168*   < > 107*   < > 95 134*   BUN 5* 8   < > 7   < >  --   --    CR 0.48* 0.62   < > 0.60   < >  --   --    GFRESTIMATED >90 >90   < > >90   < >  --   --    GFRESTBLACK >90 >90   < > >90   < >  --   --    MONICA 7.6* 7.2*   < > 7.7*   < >  --   --    ICAW  --   --   --   --   --  4.5 4.5   MAG 1.9 1.9   < > 1.7  --   --   --    PHOS 2.7 1.4*   < > 2.1*  --   --   --    AMYLASE  --   --   --  76  --   --   --    LIPASE  --   --   --  981*  --   --   --    ALBUMIN  --  1.9*  --  2.7*   < >  --   --    BILITOTAL  --  0.4  --  0.3   < >  --   --    ALKPHOS  --  72  --  39*   < >  --   --    AST  --  22  --  59*   < >  --   --    ALT  --  28  --  52*   < >  --   --     < > = values in this interval not displayed.     CBC  Recent Labs   Lab 07/31/20  0416 07/30/20  0456   HGB 7.9* 7.0*   WBC 15.7* 18.9*    216      Coags  Recent Labs   Lab 07/31/20  0416 07/30/20  0340  07/27/20  1915   INR  --   --   --  1.68*   PTT 37 44*   < >  --     < > = values in this interval not displayed.      Urinalysis  Recent Labs   Lab Test 07/29/20  0924 07/23/20  0805   COLOR Light Yellow Yellow   APPEARANCE Clear Slightly Cloudy   URINEGLC Negative Negative   URINEBILI Negative Negative   URINEKETONE Negative 5*   SG 1.018 1.020   UBLD Negative Negative   URINEPH 5.5 6.0   PROTEIN Negative Negative   NITRITE Negative Negative   LEUKEST Negative Large*   RBCU 3* 6*   WBCU 3 94*

## 2020-07-31 NOTE — PROGRESS NOTES
REGIONAL ANESTHESIA PAIN SERVICE CONTINUOUS NERVE INFUSION NOTE  Dottie Hernandez is a 55 year old female with history of chronic pancreatitis secondary to pancreatic divisum who is now POD #4 s/p PANCREATECTOMY, TOTAL, WITH AUTOLOGOUS PANCREATIC ISLET CELL TRANSPLANT, Splenectomy, gastrojejunostomy placement) and placement of bilateral T6-7 paravertebral (PV) catheters on 7/27 for postoperative pain control.  Subjective and Interval History: Extubated yesterday, no acute events overnight.  Patient seen at 1000, reporting right sided diffuse abdominal pain 8-9/10, tearful as she explained she is trying to limit the amount of pain med she uses.  Received nerve block bolus yesterday at 1820 and thinks it helped.  Denies weakness, paresthesias, circumoral numbness, metallic taste or tinnitus. Patient has not been OOB today      Antithrombotic/Thrombolytic Therapy ordered:  Heparin drip at 400 units/hr    Analgesic Medications:   Medications related to Pain Management (From now, onward)    Start     Dose/Rate Route Frequency Ordered Stop    07/30/20 1400  gabapentin (NEURONTIN) solution 300 mg      300 mg Oral or J tube EVERY 8 HOURS SCHEDULED 07/30/20 0849      07/30/20 1230  ketorolac (TORADOL) injection 15 mg      15 mg Intravenous EVERY 6 HOURS 07/30/20 1217 08/04/20 1229    07/30/20 1130  HYDROmorphone (DILAUDID) PCA 0.2 mg/mL OPIOID TOLERANT       Intravenous CONTINUOUS 07/30/20 1118      07/30/20 0318  acetaminophen (TYLENOL) tablet 650 mg      650 mg Oral EVERY 4 HOURS PRN 07/30/20 0318      07/27/20 2200  lidocaine 1 % 0.1-1 mL      0.1-1 mL Other EVERY 1 HOUR PRN 07/27/20 2200      07/27/20 2200  lidocaine (LMX4) cream       Topical EVERY 1 HOUR PRN 07/27/20 2200      07/27/20 2200  hydrOXYzine (ATARAX) syrup 25 mg      25 mg Oral EVERY 6 HOURS PRN 07/27/20 2200      07/27/20 0930  ropivacaine 0.2% (NAROPIN) 750 mL in ON-Q C-Bloc select flow ([id number] holds 600-750 mL) dual cath disposable pump      14 mL/hr   Irrigation Continuous Nerve Block 07/27/20 0928             Objective:  Lab results  Recent Labs   Lab Test 07/31/20  0416   WBC 15.7*   RBC 2.76*   HGB 7.9*   HCT 24.6*   MCV 89   MCH 28.6   MCHC 32.1   RDW 14.1          Lab Results   Component Value Date    INR 1.68 07/27/2020    INR 1.06 07/23/2020       Vitals:    Temp:  [97.3  F (36.3  C)-98.4  F (36.9  C)] 97.9  F (36.6  C)  Pulse:  [] 111  Heart Rate:  [] 112  Resp:  [8-21] 14  BP: ()/() 144/91  SpO2:  [93 %-100 %] 93 %    Exam:   GEN: alert, mild to moderate distress  NEURO/MSK: Full Strength upper and lower extremities  SKIN: bilateral paravertebral (PV) catheter sites with dressing c/d/i, no tenderness, erythema, heme, edema      Assessment and Plan:   55 year old female with history of chronic pancreatitis secondary to pancreatic divisum who is now POD #4 s/p PANCREATECTOMY, TOTAL, WITH AUTOLOGOUS PANCREATIC ISLET CELL TRANSPLANT, Splenectomy, gastrojejunostomy placement) and placement of bilateral T6-7 paravertebral (PV) catheters on 7/27 for postoperative pain      Patient is receiving modest analgesia with current multimodal therapy including bilateral PV catheters, and benefits from local anesthetic bolus Q 12 hr with infusion of ROpivacaine 0.2% at 14 mL/hr, 7 mL/hr each catheter for opioid sparing analgesia.  Motor function intact and adequate sensory block, is attempting to meet activity goals.  No evidence of adverse side effects related to local anesthetic.     1000 Cinician administered nerve block bolus  VSS   PF BUPivacaine 0.25%, total bolus 10 mL, 5 mL via right and left catheters, administered without complication, negative aspirate before and between each mL.  No symptoms of local anesthetic systemic toxicity (LAST). Remained with and assessed patient for 10 min post-injection. BP, P, and MAP stable.  Bedside RN aware of need to continue to monitoring and document BP, P, and MAP Q 10 min for an additional 20  min. Contact RAPS (jobcode ID [id number]) if any of the following: patient experiencing any untoward effects, SBP < 90, P < 50 or > 120, MAP < 60    PLAN  - patient can be evaluated to receive local anesthetic bolus Q 12 hr PRN pain not controlled with continuous infusion.  Bedside RN must page RAPS to request bolus  - continue ROpivacaine 0.2% infusion rate at 14 mL/hr, 7 mL/hr each catheter  - expected change of next On-Q pump is tomorrow  - antithrombotic/thrombolytic therapy okay to continue heparin drip at 400 units/hr as ordered. Please contact RAPS (#[phone]) prior to any medication changes  - will continue to follow and adjust as needed    - discussed plan with attending anesthesiologist    JUJU Dhaliwal [facility]  Regional Anesthesia Pain Service  7/31/2020 9:49 AM    RAPS Contact Info (24 hour job code pager is the last 4 digits) For in-house use only:   Job code ID: [id number]   [facility] 0599  Peds [phone]  [place] phone: dial * * * [phone], enter jobcode ID, then enter call-back number.    Text: Use [employer] on the Intranet <Paging/Directory> tab and enter Jobcode ID.   If no call back at any time, contact the hospital  and ask for RAPS attending or backup  [email or web address]

## 2020-07-31 NOTE — PLAN OF CARE
Shift: 6416-3761    Interval Events:    -Bupivacaine bolus, per Anesthesia for constant pain  -Roboxin added and scheduled  -Tylenol added and scheduled  -5 mg IVP Lasix for fluid volume overload  -IVF dc'd; TKO in place for carrier  -Insulin gtt advanced to algorithm #3 (2/2 decreased flow of IVF)  -Replacing K+ and Phos throughout day; recheck at 1600  -KUB for GJ tube placement verification 2/2 suspected TF residual in G-tube  -OOB with 1-2 assist    D/I:  Neuro: pain management has improved with aforementioned interventions; still attempting to avoid increase in narcotics  CV: ST; normotensive  Pulm: LS diminished; IS encouraged; productive cough  GI: TF via GJ; Redizorb changed at 1100; reflux in G-tube. KUB. TF increased to 40 ml/hr with some nausea. Team is aware and is closely monitoring.  : Armendariz removed at 1430 with PureWick placement. Diuresing well with 3.9 L out since midnight  Endo: continues with insulin gtt; advanced to alg #3; this was in correlation with decrease in flow of carrier  Heme: continues with heparin gtt at 400 units/hr  ID: Tmax 99.5  Lines: TL  subclavian; dressing changed due to necessity  Gtts: Heparin, Insulin, Dilaudid PCA, carrier  Skin: Incisions WDL  Social:  supportive and at bedside    A: Stable with ongoing pain  P: Continue to improve pain management       Recheck K+ at 1600       Increase activity and Pulmonary Hygiene    Please see flow sheets for further information. Thank you.

## 2020-07-31 NOTE — PLAN OF CARE
Major Shift Events: Pain well managed overnight. 1 time dose of .2 dilaudid for breakthrough pain. CPAP overnight for ANIBAL.   Plan: Continue to monitor for acute changes. Will alert MD of any changes.   For vital signs and complete assessments, please see documentation flowsheets.

## 2020-07-31 NOTE — DISCHARGE SUMMARY
"  [facility], [place]    Discharge Summary  Transplant Surgery    Date of Admission:  7/27/2020  Date of Discharge:  8/6/2020  Discharging Provider: Anayeli Lyles PA-C/Dr. Jarvis  Date of Service (when I saw the patient): 08/06/20    Discharge Diagnoses   Principal Problem:    Pancreatitis  Active Problems:    Sleep apnea    Systemic inflammatory response syndrome (SIRS) due to noninfectious process (H)    Recurrent pancreatitis    Acquired total absence of pancreas    Acute post-operative pain    Post-pancreatectomy diabetes (H)    H/O splenectomy      Procedure/Surgery Information   Procedure: Procedure(s):  Laproscopipc PANCREATECTOMY, TOTAL, WITH AUTOLOGOUS PANCREATIC ISLET CELL TRANSPLANT, Splenectomy, gastrojejunostomy placement  Biopsy liver   Surgeon(s): Surgeon(s) and Role:     * Steve Jarvis MD - Primary     * Renee Peña MD - Fellow - Assisting       Non-operative procedures None performed     History of Present Illness   Dottie Hernandez is a 54 year old female with a past medical history significant for chronic pancreatitis that began 20 years ago attributed to pancreatic divisum, seasonal allergies, migraines, and ANIBAL. She is now s/p TPAIT and splenectomy with stent placement with Dr. Jarvis on 7/27/20.     Hospital Course   Cardiorespiratory:   Hypercapnic respiratory failure: Reintubated on POD#2, likely 2/2 meds and likely underlying sleep apnea. Extubated again 7/30/20. Plan to utilize CPAP at night. CXR 7/29 shows \"Mild to moderate bilateral pleural effusions, left greater than right. 2. Hazy opacifications of the left lung may represent atelectasis versus infection. Recommend follow-up to clearing. Stopped IVF. Continue pulmonary hygiene.  Sleep Apnea: Pt states she was just diagnosed with sleep apnea prior to surgery, was not yet using CPAP. Pt is not utilizing CPAP at night because she states the mask makes it more difficult for her to " sleep, states she was in the process of obtaining an alternative device as an outpatient.    Heme:  Acute blood loss anemia: Hgb remains stable ~8 at the time of discharge. EBL intra-op 500mL; received 1unit pRBC. Transfusion 7/29 overnight due to hgb of 6.     GI/Nutrition:   Non-severe malnutrition in the context of acute illness: Continue clamp trials of G tube as tolerated with gravity drainage overnight. Nausea improved, reglan scheduled per J tube. On PRN Zofran. Tube feeds @ goal, per J-tube at 50mL/hr since 8/1. Added free water per J tube. Also started on sugar free clear liquid diet. Continue multivitamin.    Post-pancreatectomy diabetes: Endocrine consulted. Transitioned to subcutaneous insulin, at the time of discharge on BID lantus, 30 units every morning, 25 units every evening and sliding scale insulin.     Leukocytosis: S/p splenectomy. Central line removed to discharge. UA obtained prior to discharge showed no evidence of infection. C diff was sent and was pending at the time of discharge.  Positive islet culture: growing kleb pneumo, switched zosyn to augmentin, plan for 14 day total course of antibioitics.  SIRS: Tm 102.5 7/30, now low grade temps. Hypotensive at that time, given fluid boluses and started on empiric vanco in addition to zosyn. Lactate elevated at 2.2. Blood cultures with no growth to date. Vanco and Fluconzole stopped on 7/31. Zosyn transitioned to Augmentin 8/4 to complete 14 days of therapy    Post operative pain control: Regimen at the time of discharge:  -Dilaudid 4-6mg Q4hrs, recommend starting with 4 mg dose  -Robaxin 500 mg every 6 hours-changed to bedtime due to possible sedation  -Tylenol 650 mg every 6 hours scheduled down the J tube  -Gabapentin 300mg TID  -Ativan 0.5 mg Q12 PRN     Completed therapies:  -Toradol 15 mg every 6 hours x5 days 7/30-8/3  -ketamine, stopped due to sedation  -Precedex drip  -On-Q continuous paravertebral blocks removed 8/3  -Dilaudid PCA  stopped 8/2     Discharge Disposition   Discharged to stay locally   Condition at discharge: Stable    Pending Results   These results will be followed up by transplant coordinator  Unresulted Labs Ordered in the Past 30 Days of this Admission     Date and Time Order Name Status Description    8/6/2020 1209 Clostridium difficile toxin B PCR In process     8/6/2020 1207 Urine Culture Aerobic Bacterial In process     7/27/2020 1630 Blood culture yeast Preliminary     7/27/2020 1258 Blood culture yeast Preliminary         Primary Care Physician   Jared Kent    Physical Exam   Temp: 98.3  F (36.8  C) Temp src: Oral BP: 131/80 Pulse: 107 Heart Rate: 106 Resp: 16 SpO2: 96 % O2 Device: None (Room air)    Vitals:    08/04/20 1048 08/05/20 0100 08/06/20 0155   Weight: 65 kg (143 lb 3.2 oz) 63.8 kg (140 lb 10.5 oz) 63 kg (138 lb 14.2 oz)     Vital Signs with Ranges  Temp:  [98.3  F (36.8  C)-99.5  F (37.5  C)] 98.3  F (36.8  C)  Pulse:  [107] 107  Heart Rate:  [103-110] 106  Resp:  [14-18] 16  BP: (126-144)/(75-89) 131/80  SpO2:  [95 %-98 %] 96 %  I/O last 3 completed shifts:  In: 1595 [P.O.:100; NG/GT:545]  Out: 1800 [Urine:1600; Emesis/NG output:200]    Constitutional: Awake, alert, cooperative, no apparent distress, and appears stated age.  Eyes: Lids and lashes normal, pupils equal, round, sclera clear, conjunctiva normal.  ENT: Normocephalic, without obvious abnormality, atraumatic  Respiratory: Nonlabored resps on RA  Cardiovascular: Normal apical impulse, regular rate and rhythm, normal S1 and S2, no S3 or S4, and no murmur noted.  GI: Soft, non-distended, non-tender, midline incision, stapled, c/d/i, drain removed, drain site healing. G-J tube in place  Genitourinary:  Voiding  Skin: Normal skin color, texture, turgor  Musculoskeletal: There is no redness, warmth, or swelling of the joints.  Full range of motion noted.    Neurologic: Awake, alert, oriented to name, place and time.   Neuropsychiatric: Calm, normal  eye contact, alert, normal affect, oriented to self, place, time and situation, memory for past and recent events intact and thought process normal.    Consultations This Hospital Stay   PHARMACY IP CONSULT  ENDOCRINOLOGY IP CONSULT  ENDOCRINE DIABETES ADULT IP CONSULT  PHYSICAL THERAPY ADULT IP CONSULT  CNS DIABETES IP CONSULT  NUTRITION SERVICES ADULT IP CONSULT  PHYSICAL THERAPY ADULT IP CONSULT  OCCUPATIONAL THERAPY ADULT IP CONSULT  ENDOCRINE DIABETES ADULT IP CONSULT  NUTRITION SERVICES ADULT IP CONSULT  PHARMACY IP CONSULT  PHARMACY TO DOSE VANCO  VASCULAR ACCESS CARE ADULT IP CONSULT  PATIENT LEARNING CENTER IP CONSULT  PATIENT LEARNING CENTER IP CONSULT  PHARMACY IP CONSULT  VASCULAR ACCESS CARE ADULT IP CONSULT    Time Spent on this Encounter   I have spent greater than 30 minutes on this discharge.    Discharge Orders   Discharge Medications   Current Discharge Medication List      START taking these medications    Details   acetaminophen *SUGAR FREE* (TYLENOL) 32 mg/mL solution 20.3 mLs (650 mg) by Per J Tube route every 6 hours for 14 days  Qty: 1136.8 mL, Refills: 0    Associated Diagnoses: Acquired total absence of pancreas      Alcohol Swabs PADS Use to swab the area of the injection or erik as directed  Qty: 100 each, Refills: 0    Associated Diagnoses: Acquired total absence of pancreas; Post-pancreatectomy diabetes (H)      amoxicillin-clavulanate (AUGMENTIN) 400-57 MG/5ML suspension 10.9 mLs (875 mg) by Per J Tube route 2 times daily for 5 days  Qty: 109 mL, Refills: 0    Associated Diagnoses: Acquired total absence of pancreas      aspirin (ASA) 325 MG tablet Take 1 tablet (325 mg) by mouth daily  Qty: 30 tablet, Refills: 11    Associated Diagnoses: Acquired total absence of pancreas      blood glucose (NO BRAND SPECIFIED) lancets standard Use to test blood sugar 6-8 times daily as directed.  Qty: 100 each, Refills: 3    Comments: Pt has ALEXANDALEXA Contour Next USB meter  Associated Diagnoses:  Acquired total absence of pancreas; Post-pancreatectomy diabetes (H)      blood glucose (NO BRAND SPECIFIED) test strip Use to test blood sugar 6-8 times daily as directed.  Qty: 100 each, Refills: 0    Comments: Pt has Valeriy Contour Next USB meter  Associated Diagnoses: Acquired total absence of pancreas; Post-pancreatectomy diabetes (H)      docusate (COLACE) 50 MG/5ML liquid 5 mLs (50 mg) by Per J Tube route 2 times daily as needed for constipation  Qty: 300 mL, Refills: 1    Associated Diagnoses: Acquired total absence of pancreas      gabapentin (NEURONTIN) 250 MG/5ML solution 6 mLs (300 mg) by Oral or J tube route every 8 hours  Qty: 540 mL, Refills: 11    Associated Diagnoses: Acquired total absence of pancreas      Glucagon 3 MG/DOSE POWD Spray 1 each in nostril as needed (For hypoglycemia)  Qty: 3 each, Refills: 0    Associated Diagnoses: Post-pancreatectomy diabetes (H)      glucose 40 % (400 mg/mL) gel 15 g every 15 minutes by mouth as needed for low blood sugar. Oral gel is preferable for conscious and able to swallow patient.  Qty: 3 Tube, Refills: 0    Associated Diagnoses: Acquired total absence of pancreas; Post-pancreatectomy diabetes (H)      HYDROmorphone, STANDARD CONC, (DILAUDID) 1 MG/ML oral solution Take 4-6 mLs (4-6 mg) by mouth every 4 hours  Qty: 360 mL, Refills: 0    Associated Diagnoses: Acquired total absence of pancreas      insulin aspart (NOVOLOG PEN) 100 UNIT/ML pen Do Not give Correction Insulin if BG less than 121. -140 = 1 unit -160 = 2 units -180 = 3 units -200 = 4 units -220 = 5 units -240 = 6 units -260 = 7 units -280 = 8 units -300 = 9 units BG >300 = 10 units  Qty: 9 mL, Refills: 0    Associated Diagnoses: Acquired total absence of pancreas      !! insulin glargine (LANTUS PEN) 100 UNIT/ML pen Inject 25 Units Subcutaneous every evening  Qty: 9 mL, Refills: 0    Comments: If Lantus is not covered by insurance, may  substitute Basaglar at same dose and frequency.    Associated Diagnoses: Acquired total absence of pancreas; Post-pancreatectomy diabetes (H)      !! insulin glargine (LANTUS PEN) 100 UNIT/ML pen Inject 30 Units Subcutaneous every morning  Qty: 9 mL, Refills: 0    Comments: If Lantus is not covered by insurance, may substitute Basaglar at same dose and frequency.    Associated Diagnoses: Acquired total absence of pancreas; Post-pancreatectomy diabetes (H)      insulin pen needle (32G X 4 MM) 32G X 4 MM miscellaneous Use for insulin administration 6-8 times daily  Qty: 100 each, Refills: 3    Associated Diagnoses: Acquired total absence of pancreas; Post-pancreatectomy diabetes (H)      Lidocaine (LIDOCARE) 4 % Patch Place 1 patch onto the skin every 24 hours To prevent lidocaine toxicity, patient should be patch free for 12 hrs daily.  Qty: 5 patch, Refills: 0    Associated Diagnoses: Acquired total absence of pancreas      LORazepam (ATIVAN) 2 MG/ML (HIGH CONC) solution Take 0.25 mLs (0.5 mg) by mouth every 12 hours as needed for anxiety or nausea  Qty: 8 mL, Refills: 0    Associated Diagnoses: Acquired total absence of pancreas      methocarbamol (ROBAXIN) 500 MG tablet Take 1 tablet (500 mg) by mouth At Bedtime  Qty: 30 tablet, Refills: 0    Associated Diagnoses: Acquired total absence of pancreas      metoclopramide (REGLAN) 5 MG/5ML solution 10 mLs (10 mg) by Per J Tube route 4 times daily (before meals and nightly)  Qty: 560 mL, Refills: 0    Associated Diagnoses: Acquired total absence of pancreas      multivitamins w/minerals (CERTAVITE) liquid 15 mLs by Oral or Feeding Tube route daily  Qty: 210 mL, Refills: 0    Associated Diagnoses: Acquired total absence of pancreas      !! ondansetron (ZOFRAN-ODT) 4 MG ODT tab Take 1 tablet (4 mg) by mouth every 6 hours as needed for nausea or vomiting  Qty: 40 tablet, Refills: 0    Associated Diagnoses: Acquired total absence of pancreas      pantoprazole (PROTONIX) 2  mg/mL SUSP suspension 20 mLs (40 mg) by Per Feeding Tube route daily  Qty: 300 mL, Refills: 0    Associated Diagnoses: Acquired total absence of pancreas      sennosides (SENOKOT) 8.8 MG/5ML syrup Take 5 mLs by mouth 2 times daily  Qty: 150 mL, Refills: 0    Associated Diagnoses: Acquired total absence of pancreas      Sharps Container MISC Use as directed to dispose of needles, lancets and other sharps  Qty: 1 each, Refills: 0    Associated Diagnoses: Acquired total absence of pancreas; Post-pancreatectomy diabetes (H)       !! - Potential duplicate medications found. Please discuss with provider.      CONTINUE these medications which have NOT CHANGED    Details   fluticasone (FLONASE) 50 MCG/ACT nasal spray Spray 1 spray into both nostrils 2 times daily       !! ondansetron (ZOFRAN-ODT) 4 MG ODT tab Take 4 mg by mouth every 8 hours as needed        !! - Potential duplicate medications found. Please discuss with provider.      STOP taking these medications       bisacodyl (DULCOLAX) 5 MG EC tablet Comments:   Reason for Stopping:         HYDROcodone-acetaminophen (NORCO) 5-325 MG tablet Comments:   Reason for Stopping:                  Home infusion referral      MD face to face encounter    Documentation of Face to Face and Certification for Home Health Services    I certify that patient: Dottie Hernandez is under my care and that I, or a nurse practitioner or physician's assistant working with me, had a face-to-face encounter that meets the physician face-to-face encounter requirements with this patient on: 8/4/2020.    This encounter with the patient was in whole, or in part, for the following medical condition, which is the primary reason for home health care:   Dottie Hernandez is a 54 year old female with a past medical history significant for chronic pancreatitis that began 20 years ago attributed to pancreatic divisum, seasonal allergies, migraines, and ANIBAL. She is now s/p TPAIT and splenectomy with stent  placement with Dr. Jarvis on 7/27/20.     I certify that, based on my findings, the following services are medically necessary home health services: Nursing.    My clinical findings support the need for the above services because: Nurse is needed: To assess s/p TPAIT after changes in medications, new DM and new insulin or other medical regimen new enteral feeds.    Further, I certify that my clinical findings support that this patient is homebound (i.e. absences from home require considerable and taxing effort and are for medical reasons or [employer] services or infrequently or of short duration when for other reasons) because: Leaving home is medically contraindicated for the following reason(s): Infection risk / immunocompromised state where it is safer for them to receive services in the home...    Based on the above findings. I certify that this patient is confined to the home and needs intermittent skilled nursing care, physical therapy and/or speech therapy.  The patient is under my care, and I have initiated the establishment of the plan of care.  This patient will be followed by a physician who will periodically review the plan of care.  Physician/Provider to provide follow up care: Jared Kent    Attending [facility] physician (the Medicare certified [place] provider): Steve Jarvis MD  Physician Signature: See electronic signature associated with these discharge orders.  Date: 8/4/2020     Reason for your hospital stay    Patient underwent total pancreatectomy and auto islet transplant on 7/27/20.     Activity    Your activity upon discharge: Walk at least four times a day, lift no greater than 10 pounds for 6 weeks from the time of surgery.  After 6 weeks time please return to your normal exercise routine.  No driving while taking narcotics or until 3 weeks after surgery.     Monitor and record    blood glucose 6 times a day     When to contact your care team    Call your transplant coordinator at  [phone] if you have any of the following: Fevers, increased redness around the incision, or difficulty obtaining or taking your medications.    If it is outside of office hours, please call the hospital switchboard at [phone] and ask to have the pancreas transplant surgery fellow paged for urgent medical questions.     Wound care and dressings    Instructions to care for your wound at home: Keep wound clean and dry.  Staples may be removed at post op visit.  Pt may shower but do not soak incision in pool, hot tub, or bath while G-J tube is in place.     Tubes and drains    You are going home with the following tubes or drains: feeding tube GJ-Tube.Tube cares per hospital or home care instructions    May clamp G tube throughout the day, return to gravity drainage as needed for nausea. Clamp G tube for 2 hours after taking oral medications. May leave G tube to gravity overnight if desired.     Adult [facility] Follow-up and recommended labs and tests    [facility] Friday, 8/7/20 for labs and assess.  Labs to be drawn every Monday: CBC and CMP    Follow up with Dr. Kirby on 8/13 and Dr. Jarvis on 8/20.    Follow up with endocrinology in 2-4 weeks.  Follow up with diabetic educator (to be scheduled at the same time as endocrine follow up.)  Follow up with dietician for review of carb counting in 1-2 weeks time.    Call your transplant coordinator at [phone] or [phone] with any new onset or dramatic increase in pain, or difficulty obtaining or taking your medications.  If after hours, please call [phone] and an on call coordinator will assist you.    Appointments on [facility] and/or [facility] (with [facility] or [facility] provider or service). Call [phone] if you haven't heard regarding these appointments.     Discharge Instructions    Use CPAP or other medical device as directed for sleep apnea.     Diet    Follow this diet upon discharge:   Tube  feeds: Continuous Impact Peptide 1.5 per Jejunostomy at 50 mL/hr   Free water per J tube at 35 mL/hr  Sugar free clear liquid diet when G tube is clamped    Change 1 relizorb cartridge every 12 hours.       Data   Most Recent 3 CBC's:  Recent Labs   Lab Test 08/06/20  0539 08/05/20  0554 08/04/20  0653   WBC 18.0* 15.4* 15.1*   HGB 8.9* 8.3* 8.6*   MCV 94 92 92   PLT 1,300* 980* 1,024*     Most Recent 3 BMP's:  Recent Labs   Lab Test 08/06/20  0539 08/05/20  0554 08/04/20  0653    138 140   POTASSIUM 4.7 4.5 4.0   CHLORIDE 101 102 104   CO2 28 31 31   BUN 18 18 18   CR 0.37* 0.47* 0.60   ANIONGAP 5 5 5   MONICA 8.8 8.2* 8.3*   * 160* 92

## 2020-07-31 NOTE — PLAN OF CARE
PT 4A / Discharge Planner PT   Patient plan for discharge: not discussed today  Current status: Patient completes bed mobility and transfers with min A, ambulated in hallway ~150' with FWW + CGA, overall tolerated well, limited by pain and fatigue.   Barriers to return to prior living situation: medical status, pain, deconditioning, precautions, level of A  Recommendations for discharge: Anticipate home with assist from SO  Rationale for recommendations:  Anticipate patient will be appropriate to return home from PT perspective when medically stable, will continue to follow and update as appropriate.       Entered by: Nir Triplett 07/31/2020 4:58 PM

## 2020-07-31 NOTE — PROGRESS NOTES
SURGICAL ICU PROGRESS NOTE  2020      PRIMARY TEAM: Transplant  PRIMARY PHYSICIAN: Matheus     REASON FOR CRITICAL CARE ADMISSION: LAP TP-IAT  ADMITTING PHYSICIAN: Perlman     PLAN:  Neuro/ pain/ sedation:  # Agitation   - Monitor neurological status. Delirium preventions and precautions.   - Pain: Dilaudid PCA + bumps PRN, paravertebral catheters - w/ Ropivacaine 0.2% infusion rate per RAPS, Toradol 15mg q6H, Gabapentin 300mg TID        - Sedation plan: Weaning Precedex gtt     Pulmonary care:   # Post operative ventilatory support  # Acute hypoxic respiratory support   - Supplemental oxygen to keep saturation above 92%  - Satting well overnight on sleep CPAP, drowsy  - Incentive spirometer every 15- 30 minutes when awake.     Cardiovascular:    - Monitor hemodynamic status.       Gastroenterology/Nutrition:  - NPO, ok for ice and meds   - G to gravity  - Protonix 40mg daily  - Zofran PRN   - TF through J tube at 30mL/hr, plan to increase to 40mL today  - Neurontin liquid 300mg BID     Renal/fluids/lytes:  - D5W LR @ 50mL/hr   - Urine output increased to 1.78 mL/kg/hr from 1.3, likely due to 1x Lasix bolus given yday               - I/O:    In: +4.2 / 1.1L (IV +2.8/ 0.7, N.6/0.2, TF 0.6/0.2, Blood 0.3/0.0)                           Out: +3.5 / 2.0L (UOP: 2.9/2.0, NG 0.1/0.05, WILMA: 0.45/0.05)  . Will continue to monitor intake and output.  - Continue to use Neurotrophos for Phos repletion  - Armendariz in place     Endocrine:  #s/p TPIAT  - Insulin gtt/ D5 gtt  - Target range for BG   - Endocrine following; continue to follow      ID:  # Leukocytosis   - WBC 15.7 from 18.9, T remains within normal limits  - Continue to follow blood cultures from  - NGTD (2 day)  - Panc islet culture - grew Klebsiella Pneumoniae / oxytocae  - Zosyn x 5 days   - Diflucan fungal ppx  - Discontinuing Vancomycin; vanc trough  - Vaccination status - MenB/tDaP outstanding     Heme:     # Acute blood loss anemia    - Hemoglobin stable at 7.9 from 7.0  - Transfuse if hgb <7.0 or signs/symptoms of hypoperfusion. Monitor and trend.      Musculoskeletal:  # Weakness and deconditioning of critical illness   - Physical and occupational therapy consults     Skin:  - Flexitrac to tubes      General Cares/Prophylaxis:    DVT Prophylaxis: Pneumatic Compression Devices and Hep gtt 400 to Transplant  GI Prophylaxis: PPI  Restraints: No restraints     Lines/ tubes/ drains:  - PIV, CVC, zaragoza, GJ, WILMA drain     Disposition:  - Surgical ICU, with plan to transfer to  today for intermediate cares     Patient seen, findings and plan discussed with surgical ICU staff, Dr. Ingris Garcia MD  [facility], PGY1    ====================================    TODAY'S SUBJECTIVE/INTERVAL HISTORY:   No acute events overnight. Pain well managed overnight, with 1x dilaudid for breakthrough pain.Tachycardic up to 108. Satted well with CPAP overnight for ANIBAL.     OBJECTIVE:     Temp:  [97.3  F (36.3  C)-98.4  F (36.9  C)] 97.9  F (36.6  C)  Pulse:  [] 103  Heart Rate:  [] 108  Resp:  [8-21] 14  BP: ()/() 142/90  FiO2 (%):  [30 %] 30 %  SpO2:  [91 %-100 %] 97 %  Ventilation Mode: CPAP/PS  (Continuous positive airway pressure with Pressure Support)  FiO2 (%): 30 %  Rate Set (breaths/minute): 20 breaths/min  Tidal Volume Set (mL): 450 mL  PEEP (cm H2O): 5 cmH2O  Pressure Support (cm H2O): 7 cmH2O  Oxygen Concentration (%): 30 %  Resp: 14      I/O last 3 completed shifts:  In: 3961.95 [I.V.:2596.95; NG/GT:485]  Out: 3473 [Urine:2945; Emesis/NG output:80; Drains:448]    General/Neuro: Alert, oriented  Neuro: Actively responding to stimuli  Pulm: Non-labored with CPAP in place  Abd: soft; non-distended  WILMA drain serosang output  Extremities: mild edema in LE bilaterally  Incision site: C/d/I, staples in place  Skin: warm and well-perfused.    LABS:   Arterial Blood Gases   Recent Labs   Lab 07/29/20 2004  07/27/20  1708 07/27/20  1503 07/27/20  1400   PH 7.38 7.39 7.37 7.40   PCO2 52* 37 40 39   PO2 64* 171* 143* 164*   HCO3 31* 23 23 24     Complete Blood Count   Recent Labs   Lab 07/31/20  0416 07/30/20  0456 07/30/20  0030 07/29/20 2236   WBC 15.7* 18.9* 20.9* 14.3*   HGB 7.9* 7.0* 6.0* 6.1*    216 227 222     Basic Metabolic Panel  Recent Labs   Lab 07/31/20  0416 07/30/20  0456 07/29/20  2236 07/29/20  1859    139 139 138   POTASSIUM 3.2* 3.5 3.8 3.7   CHLORIDE 109 105 105 104   CO2 31 28 31 33*   BUN 5* 8 8 8   CR 0.48* 0.62 0.66 0.58   * 168* 248* 186*     Liver Function Tests  Recent Labs   Lab 07/30/20  0456 07/28/20  0354 07/27/20 1915   AST 22 59* 89*   ALT 28 52* 57*   ALKPHOS 72 39* 39*   BILITOTAL 0.4 0.3 0.6   ALBUMIN 1.9* 2.7* 3.0*   INR  --   --  1.68*     Pancreatic Enzymes  Recent Labs   Lab 07/28/20 0354   LIPASE 981*   AMYLASE 76     Coagulation Profile  Recent Labs   Lab 07/31/20  0416 07/30/20  0340 07/29/20  0408 07/28/20  0354  07/27/20 1915   INR  --   --   --   --   --  1.68*   PTT 37 44* 31 34   < >  --     < > = values in this interval not displayed.     IMAGING:   No results found for this or any previous visit (from the past 24 hour(s)).

## 2020-07-31 NOTE — PROGRESS NOTES
REGIONAL ANESTHESIA PAIN SERVICE CONTINUOUS NERVE INFUSION NOTE  Dottie Hernandez is a 55 year old female with history of chronic pancreatitis secondary to pancreatic divisum who is now POD #4 s/p PANCREATECTOMY, TOTAL, WITH AUTOLOGOUS PANCREATIC ISLET CELL TRANSPLANT, Splenectomy, gastrojejunostomy placement) and placement of bilateral T6-7 paravertebral (PV) catheters on 7/27 for postoperative pain control.    Subjective and Interval History: Reports 6/10 pain while at rest.    Vitals:    Pulse: 105  BP:118/91     Exam:   GEN: alert, no distress  SKIN: bilateral paravertebral (PV) catheter sites with dressing c/d/i, no tenderness, erythema, heme, edema     Assessment and Plan:   Patient is receiving infusion of Ropivacaine 0.2% at 14 mL/hr, 7 mL/hr each catheter  bilateral PV catheters.  No evidence of adverse side effects related to local anesthetic.      1800 Cinician administered nerve block bolus  PF Bupivacaine 0.25%, total bolus 10 mL, 5 mL via right and left catheters, administered without complication, negative aspirate before and between each mL.  No symptoms of local anesthetic systemic toxicity (LAST). Remained with and assessed patient for 10 min post-injection. BP, P, and MAP stable.  Bedside RN aware of need to continue to monitoring and document BP, P, and MAP Q 10 min for an additional 20 min. Contact RAPS (jobcode ID [id number]) if any of the following: patient experiencing any untoward effects, SBP < 90, P < 50 or > 120, MAP < 60     PLAN  - patient can be evaluated to receive local anesthetic bolus Q 12 hr PRN pain not controlled with continuous infusion.  Bedside RN must page RAPS to request bolus  - continue ROpivacaine 0.2% infusion rate at 14 mL/hr, 7 mL/hr each catheter     Zee Khan MD  5:36 PM     RAPS Contact Info (24 hour job code pager is the last 4 digits) For in-house use only:   Job code ID: [email or web address] [id number]   [place] 0566  [facility] 0602  [employer] phone: dial * * * [phone], enter jobcode ID, then  enter call-back number.    Text: Use [employer] on the Intranet <Paging/Directory> tab and enter Jobcode ID.   If no call back at any time, contact the hospital  and ask for RAPS attending or backup  ghislaine

## 2020-08-01 ENCOUNTER — APPOINTMENT (OUTPATIENT)
Dept: PHYSICAL THERAPY | Facility: CLINIC | Age: 55
End: 2020-08-01
Attending: TRANSPLANT SURGERY
Payer: COMMERCIAL

## 2020-08-01 LAB
ANION GAP SERPL CALCULATED.3IONS-SCNC: 4 MMOL/L (ref 3–14)
BUN SERPL-MCNC: 7 MG/DL (ref 7–30)
CALCIUM SERPL-MCNC: 8.2 MG/DL (ref 8.5–10.1)
CHLORIDE SERPL-SCNC: 106 MMOL/L (ref 94–109)
CO2 SERPL-SCNC: 32 MMOL/L (ref 20–32)
CREAT SERPL-MCNC: 0.51 MG/DL (ref 0.52–1.04)
GFR SERPL CREATININE-BSD FRML MDRD: >90 ML/MIN/{1.73_M2}
GLUCOSE BLDC GLUCOMTR-MCNC: 104 MG/DL (ref 70–99)
GLUCOSE BLDC GLUCOMTR-MCNC: 105 MG/DL (ref 70–99)
GLUCOSE BLDC GLUCOMTR-MCNC: 106 MG/DL (ref 70–99)
GLUCOSE BLDC GLUCOMTR-MCNC: 111 MG/DL (ref 70–99)
GLUCOSE BLDC GLUCOMTR-MCNC: 118 MG/DL (ref 70–99)
GLUCOSE BLDC GLUCOMTR-MCNC: 119 MG/DL (ref 70–99)
GLUCOSE BLDC GLUCOMTR-MCNC: 120 MG/DL (ref 70–99)
GLUCOSE BLDC GLUCOMTR-MCNC: 127 MG/DL (ref 70–99)
GLUCOSE BLDC GLUCOMTR-MCNC: 142 MG/DL (ref 70–99)
GLUCOSE BLDC GLUCOMTR-MCNC: 147 MG/DL (ref 70–99)
GLUCOSE BLDC GLUCOMTR-MCNC: 153 MG/DL (ref 70–99)
GLUCOSE BLDC GLUCOMTR-MCNC: 155 MG/DL (ref 70–99)
GLUCOSE BLDC GLUCOMTR-MCNC: 160 MG/DL (ref 70–99)
GLUCOSE BLDC GLUCOMTR-MCNC: 181 MG/DL (ref 70–99)
GLUCOSE BLDC GLUCOMTR-MCNC: 64 MG/DL (ref 70–99)
GLUCOSE BLDC GLUCOMTR-MCNC: 68 MG/DL (ref 70–99)
GLUCOSE BLDC GLUCOMTR-MCNC: 91 MG/DL (ref 70–99)
GLUCOSE BLDC GLUCOMTR-MCNC: 95 MG/DL (ref 70–99)
GLUCOSE SERPL-MCNC: 105 MG/DL (ref 70–99)
POTASSIUM SERPL-SCNC: 3.6 MMOL/L (ref 3.4–5.3)
SODIUM SERPL-SCNC: 142 MMOL/L (ref 133–144)

## 2020-08-01 PROCEDURE — 25000128 H RX IP 250 OP 636: Performed by: NURSE PRACTITIONER

## 2020-08-01 PROCEDURE — 25000128 H RX IP 250 OP 636: Performed by: PHYSICIAN ASSISTANT

## 2020-08-01 PROCEDURE — 25000132 ZZH RX MED GY IP 250 OP 250 PS 637: Performed by: PHYSICIAN ASSISTANT

## 2020-08-01 PROCEDURE — 36592 COLLECT BLOOD FROM PICC: CPT | Performed by: PHYSICIAN ASSISTANT

## 2020-08-01 PROCEDURE — 25000125 ZZHC RX 250: Performed by: PHYSICIAN ASSISTANT

## 2020-08-01 PROCEDURE — 97116 GAIT TRAINING THERAPY: CPT | Mod: GP | Performed by: PHYSICAL THERAPIST

## 2020-08-01 PROCEDURE — 12000026 ZZH R&B TRANSPLANT

## 2020-08-01 PROCEDURE — 25800025 ZZH RX 258: Performed by: PHYSICIAN ASSISTANT

## 2020-08-01 PROCEDURE — 00000146 ZZHCL STATISTIC GLUCOSE BY METER IP

## 2020-08-01 PROCEDURE — 97530 THERAPEUTIC ACTIVITIES: CPT | Mod: GP | Performed by: PHYSICAL THERAPIST

## 2020-08-01 PROCEDURE — 25000132 ZZH RX MED GY IP 250 OP 250 PS 637: Performed by: NURSE PRACTITIONER

## 2020-08-01 PROCEDURE — 80048 BASIC METABOLIC PNL TOTAL CA: CPT | Performed by: PHYSICIAN ASSISTANT

## 2020-08-01 PROCEDURE — 27210437 ZZH NUTRITION PRODUCT SEMIELEM INTERMED LITER

## 2020-08-01 RX ORDER — HYDROMORPHONE HYDROCHLORIDE 5 MG/5ML
2-4 SOLUTION ORAL EVERY 4 HOURS
Status: DISCONTINUED | OUTPATIENT
Start: 2020-08-01 | End: 2020-08-03

## 2020-08-01 RX ORDER — FENTANYL 25 UG/1
25 PATCH TRANSDERMAL
Status: DISCONTINUED | OUTPATIENT
Start: 2020-08-01 | End: 2020-08-01

## 2020-08-01 RX ORDER — FENTANYL 50 UG/1
50 PATCH TRANSDERMAL
Status: DISCONTINUED | OUTPATIENT
Start: 2020-08-01 | End: 2020-08-01

## 2020-08-01 RX ORDER — METOCLOPRAMIDE HYDROCHLORIDE 5 MG/ML
10 INJECTION INTRAMUSCULAR; INTRAVENOUS EVERY 6 HOURS PRN
Status: DISCONTINUED | OUTPATIENT
Start: 2020-08-01 | End: 2020-08-04

## 2020-08-01 RX ADMIN — KETOROLAC TROMETHAMINE 15 MG: 15 INJECTION, SOLUTION INTRAMUSCULAR; INTRAVENOUS at 06:37

## 2020-08-01 RX ADMIN — DEXTROSE MONOHYDRATE 25 ML: 500 INJECTION PARENTERAL at 04:34

## 2020-08-01 RX ADMIN — KETOROLAC TROMETHAMINE 15 MG: 15 INJECTION, SOLUTION INTRAMUSCULAR; INTRAVENOUS at 00:20

## 2020-08-01 RX ADMIN — HUMAN INSULIN 0.1 UNITS/HR: 100 INJECTION, SOLUTION SUBCUTANEOUS at 08:18

## 2020-08-01 RX ADMIN — METHOCARBAMOL 500 MG: 100 INJECTION, SOLUTION INTRAMUSCULAR; INTRAVENOUS at 00:20

## 2020-08-01 RX ADMIN — ACETAMINOPHEN 650 MG: 325 SOLUTION ORAL at 13:04

## 2020-08-01 RX ADMIN — KETOROLAC TROMETHAMINE 15 MG: 15 INJECTION, SOLUTION INTRAMUSCULAR; INTRAVENOUS at 12:26

## 2020-08-01 RX ADMIN — HUMAN INSULIN 2 UNITS/HR: 100 INJECTION, SOLUTION SUBCUTANEOUS at 14:21

## 2020-08-01 RX ADMIN — ACETAMINOPHEN 650 MG: 325 SOLUTION ORAL at 18:04

## 2020-08-01 RX ADMIN — DEXTROSE MONOHYDRATE 25 ML: 500 INJECTION PARENTERAL at 22:36

## 2020-08-01 RX ADMIN — HYDROMORPHONE HYDROCHLORIDE 2 MG: 1 SOLUTION ORAL at 22:27

## 2020-08-01 RX ADMIN — ONDANSETRON 4 MG: 2 INJECTION INTRAMUSCULAR; INTRAVENOUS at 22:45

## 2020-08-01 RX ADMIN — GABAPENTIN 300 MG: 250 SUSPENSION ORAL at 06:37

## 2020-08-01 RX ADMIN — PIPERACILLIN AND TAZOBACTAM 3.38 G: 3; .375 INJECTION, POWDER, FOR SOLUTION INTRAVENOUS at 02:02

## 2020-08-01 RX ADMIN — ACETAMINOPHEN 650 MG: 325 SOLUTION ORAL at 00:20

## 2020-08-01 RX ADMIN — METHOCARBAMOL 500 MG: 100 INJECTION, SOLUTION INTRAMUSCULAR; INTRAVENOUS at 06:37

## 2020-08-01 RX ADMIN — Medication 40 MG: at 08:32

## 2020-08-01 RX ADMIN — KETOROLAC TROMETHAMINE 15 MG: 15 INJECTION, SOLUTION INTRAMUSCULAR; INTRAVENOUS at 18:04

## 2020-08-01 RX ADMIN — ACETAMINOPHEN 650 MG: 325 SOLUTION ORAL at 06:37

## 2020-08-01 RX ADMIN — MULTIVITAMIN 15 ML: LIQUID ORAL at 08:35

## 2020-08-01 RX ADMIN — POTASSIUM CHLORIDE 20 MEQ: 1.5 POWDER, FOR SOLUTION ORAL at 17:05

## 2020-08-01 RX ADMIN — METHOCARBAMOL 500 MG: 100 INJECTION, SOLUTION INTRAMUSCULAR; INTRAVENOUS at 12:35

## 2020-08-01 RX ADMIN — PIPERACILLIN AND TAZOBACTAM 3.38 G: 3; .375 INJECTION, POWDER, FOR SOLUTION INTRAVENOUS at 08:30

## 2020-08-01 RX ADMIN — Medication: at 18:18

## 2020-08-01 RX ADMIN — METHOCARBAMOL 500 MG: 100 INJECTION, SOLUTION INTRAMUSCULAR; INTRAVENOUS at 18:04

## 2020-08-01 RX ADMIN — GABAPENTIN 300 MG: 250 SUSPENSION ORAL at 14:13

## 2020-08-01 RX ADMIN — PIPERACILLIN AND TAZOBACTAM 3.38 G: 3; .375 INJECTION, POWDER, FOR SOLUTION INTRAVENOUS at 14:00

## 2020-08-01 RX ADMIN — PROCHLORPERAZINE EDISYLATE 10 MG: 5 INJECTION INTRAMUSCULAR; INTRAVENOUS at 10:56

## 2020-08-01 RX ADMIN — ONDANSETRON 4 MG: 2 INJECTION INTRAMUSCULAR; INTRAVENOUS at 06:08

## 2020-08-01 RX ADMIN — PIPERACILLIN AND TAZOBACTAM 3.38 G: 3; .375 INJECTION, POWDER, FOR SOLUTION INTRAVENOUS at 20:23

## 2020-08-01 RX ADMIN — GABAPENTIN 300 MG: 250 SUSPENSION ORAL at 22:27

## 2020-08-01 RX ADMIN — ONDANSETRON 4 MG: 2 INJECTION INTRAMUSCULAR; INTRAVENOUS at 14:06

## 2020-08-01 ASSESSMENT — ACTIVITIES OF DAILY LIVING (ADL)
ADLS_ACUITY_SCORE: 14

## 2020-08-01 NOTE — PLAN OF CARE
Discharge Planner PT   Patient plan for discharge: Pt did not verbalize discharge plan  Current status: Pt demonstrated supine to/from sitting with HOB elevated, bed rail, and SBA x 1, assist only with line management.  Sit to/from standing from EOB with CGA x 1.  Pt ambulated approximately 150' with IV pole, declined to use FWW.  Pt demonstrated steady gait with IV pole, CGA x 1.   Barriers to return to prior living situation: Level of assist, medical status, activity tolerance, and stairs.   Recommendations for discharge: Home with spouse for assist  Rationale for recommendations: Anticipate pt will be able to discharge home with spouse for assist.       Entered by: Vidya Saleh 08/01/2020 3:06 PM

## 2020-08-01 NOTE — PROGRESS NOTES
07/31/20 1000   Quick Adds   Type of Visit Initial Occupational Therapy Evaluation   Living Environment   Lives With spouse   Living Arrangements house   Home Accessibility stairs to enter home   Number of Stairs, Main Entrance 1   Stair Railings, Main Entrance none   Number of Stairs, Within Home, Primary other (see comments)   Stair Railings, Within Home, Primary railing on right side (ascending)  (15)   Transportation Anticipated car, drives self   Self-Care   Usual Activity Tolerance good   Current Activity Tolerance fair   Regular Exercise Yes   Activity/Exercise Type walking   Exercise Amount/Frequency 30 mins;daily   Equipment Currently Used at Home none   Functional Level   Ambulation 0-->independent   Transferring 0-->independent   Toileting 0-->independent   Bathing 0-->independent   Dressing 0-->independent   Eating 0-->independent   Communication 0-->understands/communicates without difficulty   Swallowing 0-->swallows foods/liquids without difficulty   Cognition 0 - no cognition issues reported   Fall history within last six months no   General Information   Onset of Illness/Injury or Date of Surgery - Date 07/27/20   Referring Physician Gena Abdullahi MD    Patient/Family Goals Statement Pt would like to recover and return home independently.   Additional Occupational Profile Info/Pertinent History of Current Problem Dottie Hernandez is a 54 year old female with a past medical history significant for chronic pancreatitis that began 20 years ago attributed to pancreatic divisum, seasonal allergies, migraines, and ANIBAL. She is now s/p TPAIT and splenectomy with stent placement with Dr. Jarvis on 7/27/20.    Precautions/Limitations fall precautions;abdominal precautions   Weight-Bearing Status - LUE   (10lbs)   Weight-Bearing Status - RUE   (10lbs)   Weight-Bearing Status - LLE full weight-bearing   Weight-Bearing Status - RLE full weight-bearing   Cognitive Status Examination   Orientation  orientation to person, place and time   Level of Consciousness alert   Follows Commands (Cognition) WFL   Visual Perception   Visual Perception No deficits were identified;Wears glasses   Sensory Examination   Sensory Quick Adds No deficits were identified   Pain Assessment   Patient Currently in Pain No   Integumentary/Edema   Integumentary/Edema no deficits were identifed   Range of Motion (ROM)   ROM Comment BUE AROM WFL.    Strength   Strength Comments Pt demonstrates generalized weakness. Pt demonstrates a mild deficit in  strength.    Mobility   Bed Mobility Comments Min-Mod A and vc's for transfer supine<->seated EOB.    Transfer Skills   Transfer Comments Min A and vc's for transfer sit<->standing pivot transfer.    Activities of Daily Living Analysis   Impairments Contributing to Impaired Activities of Daily Living coordination impaired;fear and anxiety;flexibility decreased;pain;post surgical precautions;postural control impaired;strength decreased   General Therapy Interventions   Planned Therapy Interventions ADL retraining;IADL retraining;bed mobility training;ROM;strengthening;stretching;transfer training;home program guidelines;progressive activity/exercise   Clinical Impression   Criteria for Skilled Therapeutic Interventions Met yes, treatment indicated   OT Diagnosis Decreased independence with functional transfers and ADLs.    Influenced by the following impairments Decreased functional mobility, Decreased strength/endurance.    Assessment of Occupational Performance 3-5 Performance Deficits   Identified Performance Deficits Decreased independnece with functional transfers and ADLs.   Clinical Decision Making (Complexity) Low complexity   Therapy Frequency 6x/week   Predicted Duration of Therapy Intervention (days/wks) 8/7/2020   Anticipated Discharge Disposition Home with Assist   Risks and Benefits of Treatment have been explained. Yes   Patient, Family & other staff in agreement with plan  "of care Yes   [facility] AM-PAC TM \"6 Clicks\"   2016, Trustees of [facility] [facility], under license to [employer].  All rights reserved.   6 Clicks Short Forms Daily Activity Inpatient Short Form   [facility]AC  \"6 Clicks\" Daily Activity Inpatient Short Form   1. Putting on and taking off regular lower body clothing? 2 - A Lot   2. Bathing (including washing, rinsing, drying)? 2 - A Lot   3. Toileting, which includes using toilet, bedpan or urinal? 1 - Total   4. Putting on and taking off regular upper body clothing? 3 - A Little   5. Taking care of personal grooming such as brushing teeth? 4 - None   6. Eating meals? 1 - Total   Daily Activity Raw Score (Score out of 24.Lower scores equate to lower levels of function) 13   Total Evaluation Time   Total Evaluation Time (Minutes) 10     "

## 2020-08-01 NOTE — PLAN OF CARE
Shaina seemed to sleep on/off between cares. She appeared to receive adequate pain relief at times, but had issues with nausea. Medicated with Zofran x1. Did note she did not have anything out of her g-tube. Did pass on to day nurse and she was going to irrigate it. Finger sticks dropped into the 60's about an hour after she transferred from . Did give 25cc of D-50 and finger stick came up to 160's and then dropped back into the one teens. I did tell her that I had spoked to anesthesia and they would be up between 0630 and 0700. Abdominal incison CDI and G/J tube remain intact with tube feed infusing at goal rate of 40cc. Will continue to monitor and report any significant changes.

## 2020-08-01 NOTE — PROGRESS NOTES
"Inpatient Diabetes consult service (IDS)    Assessment/plan  1. Hyperglycemia s/p TPIAT 7/27.  Blood sugars are stable on the IV insulin    2.  ON tube feeding - not at goal    Due to the COVID 19 pandemic this visit was a telephone/video visit in order to help prevent spread of infection in this high risk patient and the general population. The patient gave verbal consent for the visit today.    Start time doximity video invite [phone] ; nudge- got voice mail - left message.    Miguelina Escoto MD      Chief complaint/ HPI  Dottie Hernandez is a 54 year old female with history of  Migraines, ANIBAL,  acute on chronic pancreatitis  2/2 pancrease divisum s/p laparoscopic TPIAT with Dr. Jarvis 7/27/2020.   She received 1116 islet equivalents per kg body weight.  Re-intubated 7/29 due to somnolence. Now extubated 7/30  She moved from ICU to  today.      IV insulin has been running since 7/27/2020  She was given 25 ml of D50 for glucose 64 at 0423.      Total daily insulin  7/31/2020 31.13  7/30/2020 46.57  7/29/2020 50.97  7/28/2020 51.49     Ordered DM relevant regimen  Regular insulin iV- rates have been 0.1-3 units/hour.      Active Diet Order  None     tube feeding continuous impact peptide 1.5- goal 50 ml/hour - currently 40 ml/hour    Recent Labs   Lab 08/01/20  1201 08/01/20  1055 08/01/20  1011 08/01/20  0923 08/01/20  0817 08/01/20  0557 08/01/20  0556  07/31/20  0416  07/30/20  0456  07/29/20  2236  07/29/20  1859  07/29/20  0408   GLC  --   --   --   --   --  105*  --   --  175*  --  168*  --  248*  --  186*  --  154*   * 147* 155* 120* 91  --  104*   < > 175*   < >  --    < >  --    < >  --    < >  --     < > = values in this interval not displayed.     7/23/2020 HgbA1c 6%    Exam  /79 (BP Location: Left arm)   Pulse 93   Temp 98.5  F (36.9  C) (Oral)   Resp 16   Ht 1.651 m (5' 5\")   Wt 71.7 kg (158 lb 1.6 oz)   SpO2 99%   BMI 26.31 kg/m      DATA     "

## 2020-08-01 NOTE — PROGRESS NOTES
REGIONAL ANESTHESIA PAIN SERVICE CONTINUOUS NERVE INFUSION NOTE  Dottie Hernandez is a 55 year old female with history of chronic pancreatitis secondary to pancreatic divisum who is now POD #5 s/p PANCREATECTOMY, TOTAL, WITH AUTOLOGOUS PANCREATIC ISLET CELL TRANSPLANT, Splenectomy, gastrojejunostomy placement) and placement of bilateral T6-7 paravertebral (PV) catheters on 7/27 for postoperative pain control.  Subjective and Interval History: Patient reports she had a rough night due to moving rooms. She is having significant pain this morning.  Reports the bolus of medication through the PV catheters gave significant relief last yesterday. Requesting bolus. + nausea. Denies weakness, paresthesias, circumoral numbness, metallic taste or tinnitus. Patient has not been OOB today      Antithrombotic/Thrombolytic Therapy ordered:  Heparin drip at 400 units/hr    Analgesic Medications:   Medications related to Pain Management (From now, onward)    Start     Dose/Rate Route Frequency Ordered Stop    07/30/20 1400  gabapentin (NEURONTIN) solution 300 mg      300 mg Oral or J tube EVERY 8 HOURS SCHEDULED 07/30/20 0849      07/30/20 1230  ketorolac (TORADOL) injection 15 mg      15 mg Intravenous EVERY 6 HOURS 07/30/20 1217 08/04/20 1229    07/30/20 1130  HYDROmorphone (DILAUDID) PCA 0.2 mg/mL OPIOID TOLERANT       Intravenous CONTINUOUS 07/30/20 1118      07/30/20 0318  acetaminophen (TYLENOL) tablet 650 mg      650 mg Oral EVERY 4 HOURS PRN 07/30/20 0318      07/27/20 2200  lidocaine 1 % 0.1-1 mL      0.1-1 mL Other EVERY 1 HOUR PRN 07/27/20 2200      07/27/20 2200  lidocaine (LMX4) cream       Topical EVERY 1 HOUR PRN 07/27/20 2200      07/27/20 2200  hydrOXYzine (ATARAX) syrup 25 mg      25 mg Oral EVERY 6 HOURS PRN 07/27/20 2200      07/27/20 0930  ropivacaine 0.2% (NAROPIN) 750 mL in ON-Q C-Bloc select flow ([id number] holds 600-750 mL) dual cath disposable pump      14 mL/hr  Irrigation Continuous Nerve Block 07/27/20  0928             Objective:  Lab results  Recent Labs   Lab Test 07/31/20  0416   WBC 15.7*   RBC 2.76*   HGB 7.9*   HCT 24.6*   MCV 89   MCH 28.6   MCHC 32.1   RDW 14.1          Lab Results   Component Value Date    INR 1.68 07/27/2020    INR 1.06 07/23/2020       Vitals:    Temp:  [36.6  C (97.9  F)-37.5  C (99.5  F)] 36.9  C (98.4  F)  Pulse:  [] 93  Heart Rate:  [] 99  Resp:  [8-23] 15  BP: (122-159)/() 139/88  SpO2:  [92 %-100 %] 100 %    Exam:   GEN: alert, mild to moderate distress  NEURO/MSK: Full Strength upper and lower extremities  SKIN: bilateral paravertebral (PV) catheter sites with dressing c/d/i, no tenderness, erythema, heme, edema      Assessment and Plan:   55 year old female with history of chronic pancreatitis secondary to pancreatic divisum who is now POD #5 s/p PANCREATECTOMY, TOTAL, WITH AUTOLOGOUS PANCREATIC ISLET CELL TRANSPLANT, Splenectomy, gastrojejunostomy placement) and placement of bilateral T6-7 paravertebral (PV) catheters on 7/27 for postoperative pain      Patient is receiving modest analgesia with current multimodal therapy including bilateral PV catheters, and benefits from local anesthetic bolus Q 12 hr with infusion of ROpivacaine 0.2% at 14 mL/hr, 7 mL/hr each catheter for opioid sparing analgesia.  Motor function intact and adequate sensory block, is attempting to meet activity goals.  No evidence of adverse side effects related to local anesthetic.     0754 Cinician administered nerve block bolus  VSS   PF BUPivacaine 0.25%, total bolus 10 mL, 5 mL via right and left catheters, administered without complication, negative aspirate before and between each mL.  No symptoms of local anesthetic systemic toxicity (LAST). Remained with and assessed patient for 10 min post-injection. BP, P, and MAP stable.  Bedside RN aware of need to continue to monitoring and document BP, P, and MAP Q 10 min for an additional 20 min. Contact RAPS (jobcode ID [id number]) if any  of the following: patient experiencing any untoward effects, SBP < 90, P < 50 or > 120, MAP < 60    PLAN  - patient can be evaluated to receive local anesthetic bolus Q 12 hr PRN pain not controlled with continuous infusion.  Bedside RN must page RAPS to request bolus  - continue ROpivacaine 0.2% infusion rate at 14 mL/hr, 7 mL/hr each catheter  - expected change of next On-Q pump is tomorrow  - antithrombotic/thrombolytic therapy okay to continue heparin drip at 400 units/hr as ordered. Please contact RAPS (#[phone]) prior to any medication changes  - will continue to follow and adjust as needed    - discussed plan with attending anesthesiologist    Mahdu Patrick,   Regional Anesthesia Pain Service  8/1/2020 8:10    RAPS Contact Info (24 hour job code pager is the last 4 digits) For in-house use only:   Job code ID: [email or web address] [id number]   [facility] 0599  P[facility] 0602  [place] phone: dial * * * [phone], enter jobcode ID, then enter call-back number.    Text: Use [employer] on the Intranet <Paging/Directory> tab and enter Jobcode ID.   If no call back at any time, contact the hospital  and ask for RAPS attending or backup  ghislaine

## 2020-08-01 NOTE — PLAN OF CARE
Discharge Planner OT   Patient plan for discharge: Pt would like to return home.   Current status: Pt supine inclined in bed upon arrival. Evaluation completed and treatment initiated. Therapist educated pt on OT role and discussed POC/Goals for therapy. Educated pt on abdominal precautions and safety with functional transfers. Pt required Min-Mod A and vc's for transfer supine<->seated EOB. Pt completed transfer sit<->standing pivot to bedside chair with Min A, and vc's. Pt tolerated therapy session well. VSS.   Barriers to return to prior living situation: Decreased independence with functional transfers and ADLs. Decreased strength and endurance. Fatigue, Pain. Abdominal precautions.   Recommendations for discharge: Anticipate pt will progress to discharge home with A pending continued functional improvement.   Rationale for recommendations: Pt will benefit from continued therapy to address barriers above and to maximize functional independence.        Entered by: Mike Morley 07/31/2020 10:13 PM

## 2020-08-01 NOTE — PROGRESS NOTES
"Pancreatitis Service - Daily Progress Note  08/01/2020    Assessment & Plan: Dottie Hernandez is a 54 year old female with a past medical history significant for chronic pancreatitis that began 20 years ago attributed to pancreatic divisum, seasonal allergies, migraines, and ANIBAL. She is now s/p TPAIT and splenectomy with stent placement with Dr. Jarvis on 7/27/20.     Cardiorespiratory:   Hypercapnic respiratory failure: Reintubated on POD#2, likely 2/2 meds and likely underlying sleep apnea. . Extubated again 7/30/20. Plan to utilize CPAP at night. CXR 7/29 shows \"Mild to moderate bilateral pleural effusions, left greater than right. 2. Hazy opacifications of the left lung may represent atelectasis versus infection. Recommend follow-up to clearing.\" Stopped IVF. Continue pulmonary hygiene.  Sleep Apnea: Pt states she was just diagnosed with sleep apnea prior to surgery, was not yet using CPAP. Plan to utilize CPAP at night.  Heme:  Acute blood loss anemia: EBL intra-op 500mL; received 1unit pRBC. Transfusion 7/29 overnight due to hgb of 6. Hgb now trending up, likely representative of fluid shifts.  GI/Nutrition: NG removed. Keep G-tube to gravity, 218cc out yesterday. Felt nauseous this morning. Will start on Reglan 10mg q6 PRN.  Tube feeds per J-tube advanced to 50mL/hr 8/1. Continue multivitamin.  Fluid/Electrolytes:  Fluids dc'ed ; electrolyte replacements per protocol.   : Armendariz was removed yesterday, she is self voiding.   Post-pancreatectomy diabetes: -153 with insulin drip at 1-3 units/hr, appreciate Endocrine consult.  Infection: Afebrile;   Leukocytosis: WBC 15.7 (18.9), s/p splenectomy. Continue to monitor  Positive islet culture: growing kleb pneumo, continue zosyn, planning for 5 days.   SIRS: Tm 102.5 7/30, now afebrile. Hypotensive at that time, given fluid boluses and started on empiric vanco in addition to zosyn. Lactate elevated at 2.2. Blood cultures with no growth to date. Will stop " vanco and fluconazole today.  Prophylaxis: PPI (Protonix), Anticoagulation: heparin drip to 400 units/hr  Pain control: fair, RAPS team following. Current regimen:  -Precedex drip off  -On-Q continuous paravertebral blocks, started on bolus doses q12 hrly as per pain team  - Will add Fentanyl patch 50 mcg/72 hrs   -Dilaudid PCA 0.2mg Q10min,   -Will add robaxin 500 mg IV every 6 hours  -Toradol 15 mg every 6 hours  -Will schedule tylenol down the J tube  -gabapentin 300mg TID  -ketamine now stopped due to sedation  -lorazepam now stopped due to sedation  -hydroxyzine 25mg Q6H PRN  Activity: as tolerated; PT/OT ordered  Anticipated LOS/Discharge: Will transfer to     Medical Decision Making: High  Subsequent visit 70470 (high level decision making)    ANGELO/Fellow/Resident Provider: Anayeli Lyles PA-C [id number]    Faculty: Steve Jarvis MD  __________________________________________________________________  Transplant History: Admitted 7/27/2020 for TPAIT  7/27/2020 (Islet), Postoperative day: 5     Interval History: History is obtained from the patient  Much more awake and alert today. Starting having bowel movements overnight, urgent and watery. States pain control is suboptimal, she can breath through it as long as she doesn't need to move.    ROS:   A 10-point review of systems was negative except as noted above.    Curent Meds:    acetaminophen  650 mg Per J Tube Q6H     fentaNYL  50 mcg Transdermal Q72H     fentaNYL   Transdermal Q8H     gabapentin  300 mg Oral or J tube Q8H JENNIFER     ketorolac  15 mg Intravenous Q6H     lactated ringers  250 mL Intravenous Once     methocarbamol  500 mg Intravenous Q6H     multivitamins w/minerals  15 mL Oral or Feeding Tube Daily     pantoprazole  40 mg Oral or J tube Daily     piperacillin-tazobactam  3.375 g Intravenous Q6H     disposable pump w/ anesthetic (select flow)   Irrigation Continuous Nerve Block     sodium chloride (PF)  3 mL Intracatheter Q8H       Physical Exam:  "    Admit Weight: 63.1 kg (139 lb 1.8 oz)    Current Vitals:   /79 (BP Location: Left arm)   Pulse 93   Temp 98.5  F (36.9  C) (Oral)   Resp 16   Ht 1.651 m (5' 5\")   Wt 71.7 kg (158 lb 1.6 oz)   SpO2 99%   BMI 26.31 kg/m      CVP (mmHg): 7 mmHg    Vital sign ranges:    Temp:  [97.9  F (36.6  C)-99  F (37.2  C)] 98.5  F (36.9  C)  Pulse:  [] 93  Heart Rate:  [] 98  Resp:  [10-16] 16  BP: (122-152)/() 127/79  SpO2:  [92 %-100 %] 99 %  Patient Vitals for the past 24 hrs:   BP Temp Temp src Pulse Heart Rate Resp SpO2   08/01/20 1158 127/79 98.5  F (36.9  C) Oral -- 98 16 99 %   08/01/20 0830 (!) 138/93 -- -- -- 90 -- --   08/01/20 0815 (!) 152/96 -- -- -- 93 -- 100 %   08/01/20 0753 139/88 98.4  F (36.9  C) Oral -- 99 15 100 %   08/01/20 0300 (!) 138/92 97.9  F (36.6  C) -- -- 96 14 92 %   08/01/20 0200 123/89 -- -- -- -- -- 100 %   08/01/20 0100 (!) 122/90 -- -- -- -- -- 99 %   08/01/20 0000 (!) 139/102 97.9  F (36.6  C) Oral 93 93 10 98 %   07/31/20 2300 (!) 137/94 -- -- -- -- -- 97 %   07/31/20 2200 (!) 145/110 -- -- -- -- -- 98 %   07/31/20 2100 130/87 -- -- -- -- -- 100 %   07/31/20 2000 (!) 140/93 98.2  F (36.8  C) Oral -- 105 16 98 %   07/31/20 1800 -- -- -- -- -- -- 100 %   07/31/20 1700 -- -- -- -- -- -- 98 %   07/31/20 1600 122/88 99  F (37.2  C) Oral -- 98 11 100 %   07/31/20 1500 123/82 -- -- -- 98 11 98 %   07/31/20 1400 122/89 -- -- 103 103 12 100 %     General Appearance: no acute distress  Skin: normal, warm, dry, no suspicious lesions or rashes  HEENT: G tube to gravity drainage, scant output, though light tan in appearance  Heart: regular rate and rhythm  Lungs: nonlabored resps on   Abdomen: The abdomen is rounded, soft and mildly tender to palpation. The wound is stapled, c/d/i. G tube to gravity. WILMA: serosanguinous  : zaragoza is present- clear yellow output  Extremities: edema: present generalized.     Data:   CMP  Recent Labs   Lab 08/01/20  0557 07/31/20  1612  " 07/31/20 0416 07/30/20  0456  07/28/20  0354  07/27/20  1708 07/27/20  1503     --   --  144 139   < > 143   < > 141 140   POTASSIUM 3.6 3.5   < > 3.2* 3.5   < > 3.3*   < > 3.7 4.2   CHLORIDE 106  --   --  109 105   < > 112*   < >  --   --    CO2 32  --   --  31 28   < > 27   < >  --   --    *  --   --  175* 168*   < > 107*   < > 95 134*   BUN 7  --   --  5* 8   < > 7   < >  --   --    CR 0.51*  --   --  0.48* 0.62   < > 0.60   < >  --   --    GFRESTIMATED >90  --   --  >90 >90   < > >90   < >  --   --    GFRESTBLACK >90  --   --  >90 >90   < > >90   < >  --   --    MONICA 8.2*  --   --  7.6* 7.2*   < > 7.7*   < >  --   --    ICAW  --   --   --   --   --   --   --   --  4.5 4.5   MAG  --   --   --  1.9 1.9   < > 1.7  --   --   --    PHOS  --   --   --  2.7 1.4*   < > 2.1*  --   --   --    AMYLASE  --   --   --   --   --   --  76  --   --   --    LIPASE  --   --   --   --   --   --  981*  --   --   --    ALBUMIN  --   --   --   --  1.9*  --  2.7*   < >  --   --    BILITOTAL  --   --   --   --  0.4  --  0.3   < >  --   --    ALKPHOS  --   --   --   --  72  --  39*   < >  --   --    AST  --   --   --   --  22  --  59*   < >  --   --    ALT  --   --   --   --  28  --  52*   < >  --   --     < > = values in this interval not displayed.     CBC  Recent Labs   Lab 07/31/20 0416 07/30/20  0456   HGB 7.9* 7.0*   WBC 15.7* 18.9*    216     Coags  Recent Labs   Lab 07/31/20  0416 07/30/20  0340  07/27/20  1915   INR  --   --   --  1.68*   PTT 37 44*   < >  --     < > = values in this interval not displayed.      Urinalysis  Recent Labs   Lab Test 07/29/20  0924 07/23/20  0805   COLOR Light Yellow Yellow   APPEARANCE Clear Slightly Cloudy   URINEGLC Negative Negative   URINEBILI Negative Negative   URINEKETONE Negative 5*   SG 1.018 1.020   UBLD Negative Negative   URINEPH 5.5 6.0   PROTEIN Negative Negative   NITRITE Negative Negative   LEUKEST Negative Large*   RBCU 3* 6*   WBCU 3 94*

## 2020-08-01 NOTE — PROVIDER NOTIFICATION
Spoke with anesthesia  On Q at 0615 to dose Shaina's On Q and she stated she will be here between 0630 and 0700.

## 2020-08-01 NOTE — PLAN OF CARE
Status:Stable  VS:stable  Neuros: intact  GI: having loose BMS. Burping. G-tube to gravity with 100 ml creamy output.  : voiding- will make more effort to measure urine amounts.  IV: right internal jugular TL with two tko lines, one with heparin and dilaudid, one with insulin.  Heparin at 400units/hr. On Zosyn.  Activity: Up with SBA to halls and BR.  Pain: C/o incisional pain. C/o gassy/crampy pain; thinks it is related to tube feeds.   PCA dilaudid and scheduled meds with fair to good relief of discomfort.  Respiratory: on 2 liter NC. Weaning to off.  Skin: intact except for incision/drains.  Labs: Stable.  Plan of care: addition of a Fentanyl patch this evening. Likely able to discontinue PCA tomorrow.  Emotionally, doing fairly well. Her  had to leave for [place] this afternoon; another relative will be coming to stay with her.

## 2020-08-02 ENCOUNTER — APPOINTMENT (OUTPATIENT)
Dept: PHYSICAL THERAPY | Facility: CLINIC | Age: 55
End: 2020-08-02
Attending: TRANSPLANT SURGERY
Payer: COMMERCIAL

## 2020-08-02 ENCOUNTER — APPOINTMENT (OUTPATIENT)
Dept: ULTRASOUND IMAGING | Facility: CLINIC | Age: 55
End: 2020-08-02
Attending: TRANSPLANT SURGERY
Payer: COMMERCIAL

## 2020-08-02 ENCOUNTER — APPOINTMENT (OUTPATIENT)
Dept: OCCUPATIONAL THERAPY | Facility: CLINIC | Age: 55
End: 2020-08-02
Attending: TRANSPLANT SURGERY
Payer: COMMERCIAL

## 2020-08-02 LAB
ANION GAP SERPL CALCULATED.3IONS-SCNC: 4 MMOL/L (ref 3–14)
APTT PPP: 33 SEC (ref 22–37)
BASOPHILS # BLD AUTO: 0 10E9/L (ref 0–0.2)
BASOPHILS NFR BLD AUTO: 0 %
BUN SERPL-MCNC: 13 MG/DL (ref 7–30)
CALCIUM SERPL-MCNC: 8.3 MG/DL (ref 8.5–10.1)
CHLORIDE SERPL-SCNC: 106 MMOL/L (ref 94–109)
CO2 SERPL-SCNC: 30 MMOL/L (ref 20–32)
CREAT SERPL-MCNC: 0.58 MG/DL (ref 0.52–1.04)
DIFFERENTIAL METHOD BLD: ABNORMAL
EOSINOPHIL # BLD AUTO: 2.2 10E9/L (ref 0–0.7)
EOSINOPHIL NFR BLD AUTO: 16.4 %
ERYTHROCYTE [DISTWIDTH] IN BLOOD BY AUTOMATED COUNT: 14.6 % (ref 10–15)
GFR SERPL CREATININE-BSD FRML MDRD: >90 ML/MIN/{1.73_M2}
GLUCOSE BLDC GLUCOMTR-MCNC: 101 MG/DL (ref 70–99)
GLUCOSE BLDC GLUCOMTR-MCNC: 105 MG/DL (ref 70–99)
GLUCOSE BLDC GLUCOMTR-MCNC: 110 MG/DL (ref 70–99)
GLUCOSE BLDC GLUCOMTR-MCNC: 110 MG/DL (ref 70–99)
GLUCOSE BLDC GLUCOMTR-MCNC: 111 MG/DL (ref 70–99)
GLUCOSE BLDC GLUCOMTR-MCNC: 113 MG/DL (ref 70–99)
GLUCOSE BLDC GLUCOMTR-MCNC: 115 MG/DL (ref 70–99)
GLUCOSE BLDC GLUCOMTR-MCNC: 115 MG/DL (ref 70–99)
GLUCOSE BLDC GLUCOMTR-MCNC: 116 MG/DL (ref 70–99)
GLUCOSE BLDC GLUCOMTR-MCNC: 133 MG/DL (ref 70–99)
GLUCOSE BLDC GLUCOMTR-MCNC: 134 MG/DL (ref 70–99)
GLUCOSE BLDC GLUCOMTR-MCNC: 138 MG/DL (ref 70–99)
GLUCOSE BLDC GLUCOMTR-MCNC: 139 MG/DL (ref 70–99)
GLUCOSE BLDC GLUCOMTR-MCNC: 142 MG/DL (ref 70–99)
GLUCOSE BLDC GLUCOMTR-MCNC: 169 MG/DL (ref 70–99)
GLUCOSE BLDC GLUCOMTR-MCNC: 62 MG/DL (ref 70–99)
GLUCOSE BLDC GLUCOMTR-MCNC: 86 MG/DL (ref 70–99)
GLUCOSE BLDC GLUCOMTR-MCNC: 91 MG/DL (ref 70–99)
GLUCOSE SERPL-MCNC: 142 MG/DL (ref 70–99)
HCT VFR BLD AUTO: 28.1 % (ref 35–47)
HGB BLD-MCNC: 8.4 G/DL (ref 11.7–15.7)
LACTATE BLD-SCNC: 0.9 MMOL/L (ref 0.7–2)
LYMPHOCYTES # BLD AUTO: 2.2 10E9/L (ref 0.8–5.3)
LYMPHOCYTES NFR BLD AUTO: 16.4 %
MAGNESIUM SERPL-MCNC: 2 MG/DL (ref 1.6–2.3)
MCH RBC QN AUTO: 27.5 PG (ref 26.5–33)
MCHC RBC AUTO-ENTMCNC: 29.9 G/DL (ref 31.5–36.5)
MCV RBC AUTO: 92 FL (ref 78–100)
MONOCYTES # BLD AUTO: 1.5 10E9/L (ref 0–1.3)
MONOCYTES NFR BLD AUTO: 11.2 %
NEUTROPHILS # BLD AUTO: 7.7 10E9/L (ref 1.6–8.3)
NEUTROPHILS NFR BLD AUTO: 56 %
NRBC # BLD AUTO: 0.8 10*3/UL
NRBC BLD AUTO-RTO: 6 /100
PHOSPHATE SERPL-MCNC: 2.9 MG/DL (ref 2.5–4.5)
PLATELET # BLD AUTO: 668 10E9/L (ref 150–450)
PLATELET # BLD EST: ABNORMAL 10*3/UL
POLYCHROMASIA BLD QL SMEAR: SLIGHT
POTASSIUM SERPL-SCNC: 3.7 MMOL/L (ref 3.4–5.3)
RBC # BLD AUTO: 3.05 10E12/L (ref 3.8–5.2)
SODIUM SERPL-SCNC: 140 MMOL/L (ref 133–144)
WBC # BLD AUTO: 13.7 10E9/L (ref 4–11)

## 2020-08-02 PROCEDURE — 25000128 H RX IP 250 OP 636: Performed by: PHYSICIAN ASSISTANT

## 2020-08-02 PROCEDURE — 25000125 ZZHC RX 250: Performed by: PHYSICIAN ASSISTANT

## 2020-08-02 PROCEDURE — 12000026 ZZH R&B TRANSPLANT

## 2020-08-02 PROCEDURE — 25000128 H RX IP 250 OP 636: Performed by: STUDENT IN AN ORGANIZED HEALTH CARE EDUCATION/TRAINING PROGRAM

## 2020-08-02 PROCEDURE — 25000125 ZZHC RX 250

## 2020-08-02 PROCEDURE — 25800025 ZZH RX 258: Performed by: PHYSICIAN ASSISTANT

## 2020-08-02 PROCEDURE — 25000132 ZZH RX MED GY IP 250 OP 250 PS 637: Performed by: NURSE PRACTITIONER

## 2020-08-02 PROCEDURE — 25800030 ZZH RX IP 258 OP 636

## 2020-08-02 PROCEDURE — 25000132 ZZH RX MED GY IP 250 OP 250 PS 637: Performed by: PHYSICIAN ASSISTANT

## 2020-08-02 PROCEDURE — 97530 THERAPEUTIC ACTIVITIES: CPT | Mod: GP

## 2020-08-02 PROCEDURE — 27110038 ZZH RX 271

## 2020-08-02 PROCEDURE — 00000146 ZZHCL STATISTIC GLUCOSE BY METER IP

## 2020-08-02 PROCEDURE — 83605 ASSAY OF LACTIC ACID: CPT | Performed by: TRANSPLANT SURGERY

## 2020-08-02 PROCEDURE — 84100 ASSAY OF PHOSPHORUS: CPT | Performed by: NURSE PRACTITIONER

## 2020-08-02 PROCEDURE — 27210437 ZZH NUTRITION PRODUCT SEMIELEM INTERMED LITER

## 2020-08-02 PROCEDURE — 36592 COLLECT BLOOD FROM PICC: CPT | Performed by: NURSE PRACTITIONER

## 2020-08-02 PROCEDURE — 80048 BASIC METABOLIC PNL TOTAL CA: CPT | Performed by: NURSE PRACTITIONER

## 2020-08-02 PROCEDURE — 93975 VASCULAR STUDY: CPT | Mod: TC

## 2020-08-02 PROCEDURE — 36592 COLLECT BLOOD FROM PICC: CPT | Performed by: TRANSPLANT SURGERY

## 2020-08-02 PROCEDURE — 97116 GAIT TRAINING THERAPY: CPT | Mod: GP

## 2020-08-02 PROCEDURE — 85025 COMPLETE CBC W/AUTO DIFF WBC: CPT | Performed by: NURSE PRACTITIONER

## 2020-08-02 PROCEDURE — 85730 THROMBOPLASTIN TIME PARTIAL: CPT | Performed by: NURSE PRACTITIONER

## 2020-08-02 PROCEDURE — 83735 ASSAY OF MAGNESIUM: CPT | Performed by: NURSE PRACTITIONER

## 2020-08-02 PROCEDURE — 97535 SELF CARE MNGMENT TRAINING: CPT | Mod: GO

## 2020-08-02 RX ORDER — SODIUM CHLORIDE 9 MG/ML
INJECTION, SOLUTION INTRAVENOUS
Status: COMPLETED
Start: 2020-08-02 | End: 2020-08-02

## 2020-08-02 RX ORDER — HYDROMORPHONE HYDROCHLORIDE 1 MG/ML
.3-.5 INJECTION, SOLUTION INTRAMUSCULAR; INTRAVENOUS; SUBCUTANEOUS EVERY 4 HOURS PRN
Status: DISCONTINUED | OUTPATIENT
Start: 2020-08-02 | End: 2020-08-04

## 2020-08-02 RX ADMIN — KETOROLAC TROMETHAMINE 15 MG: 15 INJECTION, SOLUTION INTRAMUSCULAR; INTRAVENOUS at 00:05

## 2020-08-02 RX ADMIN — GABAPENTIN 300 MG: 250 SUSPENSION ORAL at 13:31

## 2020-08-02 RX ADMIN — HYDROMORPHONE HYDROCHLORIDE 4 MG: 1 SOLUTION ORAL at 11:04

## 2020-08-02 RX ADMIN — Medication: at 08:59

## 2020-08-02 RX ADMIN — GABAPENTIN 300 MG: 250 SUSPENSION ORAL at 22:21

## 2020-08-02 RX ADMIN — PIPERACILLIN AND TAZOBACTAM 3.38 G: 3; .375 INJECTION, POWDER, FOR SOLUTION INTRAVENOUS at 14:27

## 2020-08-02 RX ADMIN — ACETAMINOPHEN 650 MG: 325 SOLUTION ORAL at 18:25

## 2020-08-02 RX ADMIN — HYDROMORPHONE HYDROCHLORIDE 4 MG: 1 SOLUTION ORAL at 06:46

## 2020-08-02 RX ADMIN — METHOCARBAMOL 500 MG: 100 INJECTION, SOLUTION INTRAMUSCULAR; INTRAVENOUS at 07:06

## 2020-08-02 RX ADMIN — METHOCARBAMOL 500 MG: 100 INJECTION, SOLUTION INTRAMUSCULAR; INTRAVENOUS at 20:08

## 2020-08-02 RX ADMIN — HYDROMORPHONE HYDROCHLORIDE 4 MG: 1 SOLUTION ORAL at 23:38

## 2020-08-02 RX ADMIN — HYDROMORPHONE HYDROCHLORIDE 4 MG: 1 SOLUTION ORAL at 02:06

## 2020-08-02 RX ADMIN — ONDANSETRON 4 MG: 2 INJECTION INTRAMUSCULAR; INTRAVENOUS at 17:28

## 2020-08-02 RX ADMIN — GABAPENTIN 300 MG: 250 SUSPENSION ORAL at 06:02

## 2020-08-02 RX ADMIN — KETOROLAC TROMETHAMINE 15 MG: 15 INJECTION, SOLUTION INTRAMUSCULAR; INTRAVENOUS at 12:24

## 2020-08-02 RX ADMIN — SODIUM CHLORIDE 500 ML: 9 INJECTION, SOLUTION INTRAVENOUS at 12:18

## 2020-08-02 RX ADMIN — ACETAMINOPHEN 650 MG: 325 SOLUTION ORAL at 00:05

## 2020-08-02 RX ADMIN — METHOCARBAMOL 500 MG: 100 INJECTION, SOLUTION INTRAMUSCULAR; INTRAVENOUS at 01:17

## 2020-08-02 RX ADMIN — Medication 40 MG: at 08:53

## 2020-08-02 RX ADMIN — METHOCARBAMOL 500 MG: 100 INJECTION, SOLUTION INTRAMUSCULAR; INTRAVENOUS at 13:32

## 2020-08-02 RX ADMIN — HUMAN INSULIN 2 UNITS/HR: 100 INJECTION, SOLUTION SUBCUTANEOUS at 22:27

## 2020-08-02 RX ADMIN — PIPERACILLIN AND TAZOBACTAM 3.38 G: 3; .375 INJECTION, POWDER, FOR SOLUTION INTRAVENOUS at 07:30

## 2020-08-02 RX ADMIN — ACETAMINOPHEN 650 MG: 325 SOLUTION ORAL at 06:02

## 2020-08-02 RX ADMIN — HYDROMORPHONE HYDROCHLORIDE 4 MG: 1 SOLUTION ORAL at 14:48

## 2020-08-02 RX ADMIN — METOCLOPRAMIDE HYDROCHLORIDE 10 MG: 5 INJECTION INTRAMUSCULAR; INTRAVENOUS at 11:54

## 2020-08-02 RX ADMIN — KETOROLAC TROMETHAMINE 15 MG: 15 INJECTION, SOLUTION INTRAMUSCULAR; INTRAVENOUS at 06:46

## 2020-08-02 RX ADMIN — DEXTROSE MONOHYDRATE 25 ML: 500 INJECTION PARENTERAL at 22:37

## 2020-08-02 RX ADMIN — ACETAMINOPHEN 650 MG: 325 SOLUTION ORAL at 12:09

## 2020-08-02 RX ADMIN — MULTIVITAMIN 15 ML: LIQUID ORAL at 12:09

## 2020-08-02 RX ADMIN — KETOROLAC TROMETHAMINE 15 MG: 15 INJECTION, SOLUTION INTRAMUSCULAR; INTRAVENOUS at 18:25

## 2020-08-02 RX ADMIN — HYDROMORPHONE HYDROCHLORIDE 4 MG: 1 SOLUTION ORAL at 20:08

## 2020-08-02 RX ADMIN — PIPERACILLIN AND TAZOBACTAM 3.38 G: 3; .375 INJECTION, POWDER, FOR SOLUTION INTRAVENOUS at 20:08

## 2020-08-02 RX ADMIN — HUMAN INSULIN 1 UNITS/HR: 100 INJECTION, SOLUTION SUBCUTANEOUS at 06:11

## 2020-08-02 RX ADMIN — PIPERACILLIN AND TAZOBACTAM 3.38 G: 3; .375 INJECTION, POWDER, FOR SOLUTION INTRAVENOUS at 02:05

## 2020-08-02 RX ADMIN — POTASSIUM CHLORIDE 20 MEQ: 1.5 POWDER, FOR SOLUTION ORAL at 18:25

## 2020-08-02 ASSESSMENT — ACTIVITIES OF DAILY LIVING (ADL)
ADLS_ACUITY_SCORE: 14

## 2020-08-02 ASSESSMENT — MIFFLIN-ST. JEOR: SCORE: 1269.49

## 2020-08-02 ASSESSMENT — PAIN DESCRIPTION - DESCRIPTORS
DESCRIPTORS: ACHING

## 2020-08-02 NOTE — PLAN OF CARE
[id number]  Reason for Admission: Pt. s/p Auto Islet transplant 7/27/2020  Neuro: Pt. alert & Ox4  Behavior: Pt. calm & cooperative with cares.   Activity: Pt. up with standby assist for line management.   Vital: AVSS. Weaned off oxygen during the day- sats 90-95% on room air. Encouraged IS.  Endocrine: BG range: 116-169, insulin gtt per algorithm 1- 2.  LDAs: Right internal jugular with straight rated Heparin gtt at 400units/hour, NS at TKO, insulin gtt. G/J tube. JT with tube feeds, GT to gravity with 175ml output.   Cardiac: WNL  Respiratory: Pt. with sleep apnea. Pt. refusing CPAP.  GI/: Pt. voiding spontaneously with adequate output, 2 loose stools this shift.   Skin: Midline incision stapled & c/d/i  Pain: Abdominal pain managed with Dilaudid pca, PCA was dc'd this afternoon.Has on-q paravertebral blocks, which gives her good relief. Pt. Has occasional severe cramping pain rt bowel movements.  Dilaudid, Toradol, Robaxin & Tylenol.  Nausea: Pt. complains of nausea this morning.Was started on IV Reglan in addition to other antiemetics.   Diet: NPO with continuous tube feeds at 50cc/hour into JT. Next Relizorb change at 1230 today.    Labs/Imaging: N/A  Plan: teaching needed for diabetes and tube feeding.Continue to follow POC &  notify MD with change in status.                 Revision History

## 2020-08-02 NOTE — PROGRESS NOTES
REGIONAL ANESTHESIA PAIN SERVICE (RAPS) EVALUATION:  - Time: 800.  Called to evaluate patient for 813 PM  - Evaluation: Patient reports pain intensity with current therapy 6/10 at rest and 7/10 with activity  General: healthy, alert and moderate distress  Catheter system integrity: intact  Skin: dressing clean, dry and intact  Motor intact.  Current vitals: 155/76, 100%     - Assessment/Plan    PAIN: somewhat controlled     INTERVENTION:  Bolus administered    MEDICATION: PF bupivacaine 0.25%, total bolus 10 mL, 5 mL via each catheter    PROCEDURE: Clinician bolus via  nerve block catheter; administered without complication with negative aspirate before and between each mL.    No symptoms of local anesthetic systemic toxicity (LAST). Remained with and assessed patient for 10 min post-injection. BP, P and MAP stable    POST-PROCEDURE: Bedside RN aware of need to continue BP, P and MAP monitoring Q 10 min for an additional 30 min. Contact RAPSif any of the following: patient experiencing any untoward effects, SBP< 90, P < 50 or > 120, MAP < 60     - patient can be evaluated to receive local anesthetic bolus Q 12 hr PRN pain not controlled with continuous infusion.  Bedside nurse must page RAPS to request bolus    Madhu Patrick, DO      RAPS Contact Info (24 hour job code pager is the last 4 digits) For in-house use only:  [employer] phone: [place] [phone], [employer] [phone], nprogress [phone], then enter call-back number.    Text: Use [employer] on the Intranet <Paging/Directory> tab and enter Jobcode ID.   If no call back at any time, contact the hospital  and ask for RAPS attending or backup

## 2020-08-02 NOTE — PROGRESS NOTES
Perioperative Pain Service Cross Cover Note    S: Called by RN to assess patient as she is having increased pain.  Denies adverse effects from catheters.  O: Patient is alert and oriented complaining of pain in her abdomen.    Vitals: 133/83, T: 98.7, P: 96, R: 16    A/P: 55 year old yo POD#6, getting consistent boluses nearly every 12 hours.  She did not get a bolus this morning and pain is overall improving and is now off the hydromorphone PCA.   Bilateral paravertebral catheters bolused with PF bupivacaine 0.25%, 5 mL in each catheter incrementally with negative aspirate before and between each mL without complication, no symptoms of local anesthetic toxicity (LAST).  Remained with and assessed patient for 10 min post-injection.  Bedside nurse aware she should continue monitor BP/P, MAP Q 5 min x 30 min, and assess for any untoward effects to local anesthetic following injection and contact anesthesia for any questions or concerns.    As tomorrow if catheter day # 7, catheters will need to be removed.  Her heparin infusion will need to be held for 4 hours prior to removal.    Bill Langford III, MD   Anesthesiology Resident     8/2/2020 6:55 PM

## 2020-08-02 NOTE — PROGRESS NOTES
REGIONAL ANESTHESIA PAIN SERVICE CONTINUOUS NERVE INFUSION NOTE  Dottie Hernandez is a 55 year old female with history of chronic pancreatitis secondary to pancreatic divisum who is now POD #6 s/p PANCREATECTOMY, TOTAL, WITH AUTOLOGOUS PANCREATIC ISLET CELL TRANSPLANT, Splenectomy, gastrojejunostomy placement) and placement of bilateral T6-7 paravertebral (PV) catheters on 7/27 for postoperative pain control.  Subjective and Interval History: Patient reports she had a rough night due to moving rooms. She is having significant pain this morning.  Reports the bolus of medication through the PV catheters gave significant relief last yesterday. Requesting bolus. + nausea. Denies weakness, paresthesias, circumoral numbness, metallic taste or tinnitus. Patient has not been OOB today      Antithrombotic/Thrombolytic Therapy ordered:  Heparin infusion at 400 units/hr    Objective:  Lab results  Recent Labs   Lab Test 07/31/20  0416   WBC 15.7*   RBC 2.76*   HGB 7.9*   HCT 24.6*   MCV 89   MCH 28.6   MCHC 32.1   RDW 14.1          Lab Results   Component Value Date    INR 1.68 07/27/2020    INR 1.06 07/23/2020       Vitals:    Temp:  [98.3  F (36.8  C)-99.2  F (37.3  C)] 98.7  F (37.1  C)  Pulse:  [96] 96  Heart Rate:  [] 94  Resp:  [15-16] 16  BP: (127-152)/(76-96) 133/85  SpO2:  [96 %-100 %] 96 %    Exam:   GEN: alert, mild to moderate distress  NEURO/MSK: Full Strength upper and lower extremities  SKIN: bilateral paravertebral (PV) catheter sites with dressing c/d/i, no tenderness, erythema, heme, edema      Assessment and Plan:   55 year old female with history of chronic pancreatitis secondary to pancreatic divisum who is now POD #6 s/p PANCREATECTOMY, TOTAL, WITH AUTOLOGOUS PANCREATIC ISLET CELL TRANSPLANT, Splenectomy, gastrojejunostomy placement) and placement of bilateral T6-7 paravertebral (PV) catheters on 7/27 for postoperative pain      Patient is receiving modest analgesia with current multimodal  therapy including bilateral PV catheters, and benefits from local anesthetic bolus Q 12 hr with infusion of ROpivacaine 0.2% at 14 mL/hr, 7 mL/hr each catheter for opioid sparing analgesia.  Motor function intact and adequate sensory block, is attempting to meet activity goals.  No evidence of adverse side effects related to local anesthetic.     0754 Cinician administered nerve block bolus  VSS   PF BUPivacaine 0.25%, total bolus 10 mL, 5 mL via right and left catheters, administered without complication, negative aspirate before and between each mL.  No symptoms of local anesthetic systemic toxicity (LAST). Remained with and assessed patient for 10 min post-injection. BP, P, and MAP stable.  Bedside RN aware of need to continue to monitoring and document BP, P, and MAP Q 10 min for an additional 20 min. Contact RAPS (jobcode ID [id number]) if any of the following: patient experiencing any untoward effects, SBP < 90, P < 50 or > 120, MAP < 60    PLAN  - patient can be evaluated to receive local anesthetic bolus Q 12 hr PRN pain not controlled with continuous infusion.  Bedside RN must page RAPS to request bolus  - continue ROpivacaine 0.2% infusion rate at 14 mL/hr, 7 mL/hr each catheter  - expected change of next On-Q pump is today  - antithrombotic/thrombolytic therapy okay to continue heparin drip at 400 units/hr as ordered. Please contact RAPS (#[phone]) prior to any medication changes  - will continue to follow and adjust as needed    - discussed plan with attending anesthesiologist    Bill Langford III, MD  Regional Anesthesia Pain Service  8/1/2020 8:10    RAPS Contact Info (24 hour job code pager is the last 4 digits) For in-house use only:   Job code ID: [id number]   [facility] 0599  [facility] 0602  [facility] phone: dial * [phone], enter jobcode ID, then enter call-back number.    Text: Use [employer] on the Intranet <Paging/Directory> tab and enter Jobcode ID.   If no call back at any time, contact the hospital   and ask for RAPS attending or backup  ghislaine

## 2020-08-02 NOTE — PLAN OF CARE
Discharge Planner PT   7A -  Patient plan for discharge: Home with assist.  Current status: Pt completes supine <> sit with SBA. Pt transfers with SBA. Pt ambulates ~350ft with 1 UE support on IV pole and SBA, slow speed, slightly forward posture though pt able to correct with cues, pt politely declines trial of no AD though agreeable to attempt next session. VSS on RA.  Barriers to return to prior living situation: Medical needs, weakness, high level balance, activity tolerance.  Recommendations for discharge: Home with assist.  Rationale for recommendations: Pt progressing well in therapies - anticipate safe discharge home with assist once medically cleared for discharge.

## 2020-08-02 NOTE — PROGRESS NOTES
Inpatient Diabetes consult service (IDS)    Assessment/plan  1. Hyperglycemia s/p TPIAT 7/27.  Blood sugars are stable on the IV insulin    Anticipate the following doses for transition -- order not yet written  Glargine 30 units followed by stop IV Insulin 2-4 hours later  Medium dose aspart every 4 hours    2.  ON tube feeding - at 50 ml/hour goal since 2 PM yesterday per her nurse who was in the room during my interview today.  I Have noted that The I and O sheet shows this at 40 ml/hour still.      Due to the COVID 19 pandemic this visit was a video visit in order to help prevent spread of infection in this high risk patient and the general population. The patient gave verbal consent for the visit today.    Start time [employer] video invite and nudge 1432- we spoke for 2 minutes and then she said surgeon entered the room. I told her I would call back ar 1445.  She instead called me back through [employer] videot - 2nd call lasted 3 minutes  Stop time 1452  Total time 5 minutes face to face conversation/exam; 15 minutes of calculations to plan transition off IV insulin    Miguelina Escoto MD    Chief complaint/ HPI  Dottie Hernandez is a 54 year old female with history of  Migraines, ANIBAL,  acute on chronic pancreatitis  2/2 pancrease divisum s/p laparoscopic TPIAT with Dr. Jarvis 7/27/2020.   She received 1116 islet equivalents per kg body weight.  Re-intubated 7/29 due to somnolence. Now extubated 7/30  She moved from ICU to  8/1/2020. Today she says her surgeon suggested she transition off IV insulin tomorrow.      IV insulin has been running since 7/27/2020    Total daily insulin  8/2/2020: 16.6 from midnight to 10 AM-- predicted TDI 38.4  8/1/2020:  40.6 by my hand calculations  7/31/2020 40.75 by my hand calculations  7/30/2020 46.57  7/29/2020 50.97  7/28/2020 51.49     Ordered DM relevant regimen  Regular insulin iV- rates have been 0.1-3 units/hour.      Active Diet Order  None     tube feeding  "continuous impact peptide 1.5- goal 50 ml/hour - currently 40 ml/hour p[er the I and O sheet.  Her nurse who was in the room during my interview states it has been at 50 ml/hour since 2 PM yesterday.      Recent Labs   Lab 08/02/20  1330 08/02/20  1112 08/02/20  0910 08/02/20  0755 08/02/20  0743 08/02/20  0706 08/02/20  0601  08/01/20  0557  07/31/20  0416  07/30/20  0456  07/29/20  2236  07/29/20  1859   GLC  --   --   --   --  142*  --   --   --  105*  --  175*  --  168*  --  248*  --  186*   * 110* 101* 116*  --  134* 110*   < >  --    < > 175*   < >  --    < >  --    < >  --     < > = values in this interval not displayed.     7/23/2020 HgbA1c 6%    ROS;  Learning about the diabetes  Has been up waking x 2 today  Abdomen is doing OK       Exam  BP (!) 144/84 (BP Location: Left arm)   Pulse 96   Temp 98.8  F (37.1  C) (Oral)   Resp 16   Ht 1.651 m (5' 5\")   Wt 66.4 kg (146 lb 4.8 oz)   SpO2 97%   BMI 24.35 kg/m    Lying in bed with HOB up - she appears well  Skin: normal appearing color  Respiratory: normal effort; no respiratory distress or increased work of breathing; no gross wheezing or coughing  Awake, alert, responds appropriately to questions; speech is fluent    DATA     Results for ALISE PHILLIPS (MRN [id number]) as of 8/2/2020 08:53   Ref. Range 8/2/2020 07:43   Sodium Latest Ref Range: 133 - 144 mmol/L 140   Potassium Latest Ref Range: 3.4 - 5.3 mmol/L 3.7   Chloride Latest Ref Range: 94 - 109 mmol/L 106   Carbon Dioxide Latest Ref Range: 20 - 32 mmol/L 30   Urea Nitrogen Latest Ref Range: 7 - 30 mg/dL 13   Creatinine Latest Ref Range: 0.52 - 1.04 mg/dL 0.58   GFR Estimate Latest Ref Range: >60 mL/min/1.73_m2 >90   GFR Estimate If Black Latest Ref Range: >60 mL/min/1.73_m2 >90   Calcium Latest Ref Range: 8.5 - 10.1 mg/dL 8.3 (L)   Anion Gap Latest Ref Range: 3 - 14 mmol/L 4   Magnesium Latest Ref Range: 1.6 - 2.3 mg/dL 2.0   Phosphorus Latest Ref Range: 2.5 - 4.5 mg/dL 2.9   Glucose " Latest Ref Range: 70 - 99 mg/dL 142 (H)   WBC Latest Ref Range: 4.0 - 11.0 10e9/L 13.7 (H)   Hemoglobin Latest Ref Range: 11.7 - 15.7 g/dL 8.4 (L)   Hematocrit Latest Ref Range: 35.0 - 47.0 % 28.1 (L)   Platelet Count Latest Ref Range: 150 - 450 10e9/L 668 (H)

## 2020-08-02 NOTE — PLAN OF CARE
Discharge Planner OT   Patient plan for discharge: Home with assist   Current status: Pt demonstrated toilet transfer after ambulating to BR 10' SBA with IV and educated on shower transfer and verbalized understanding. Pt ind donned LBE and discussed home living situation and concerns about ambulation post discharge. Would like information on walker, but showed all OT goals met.   Barriers to return to prior living situation: decreased ind with ADL/IADLs, deconditioning  Recommendations for discharge: Home with assist   Rationale for recommendations: Pt progressing well in therapies - anticipate safe discharge home with assist once medically cleared for discharge.       Entered by: Vivian Traylor 08/02/2020 2:50 PM     Occupational Therapy Discharge Summary    Reason for therapy discharge:    All goals and outcomes met, no further needs identified.    Progress towards therapy goal(s). See goals on [facility] Plan in [facility] electronic health record for goal details.  Goals met    Therapy recommendation(s):    No further therapy is recommended.  No further OT needs but PT to follow until discharge.

## 2020-08-02 NOTE — PLAN OF CARE
"/85 (BP Location: Left arm)   Pulse 93   Temp 98.3  F (36.8  C) (Oral)   Resp 16   Ht 1.651 m (5' 5\")   Wt 66.4 kg (146 lb 4.8 oz)   SpO2 100%   BMI 24.35 kg/m      [id number]  Reason for Admission: Pt. s/p Auto Islet transplant 7/27/2020  Neuro: Pt. alert & Ox4  Behavior: Pt. calm & cooperative with cares.   Activity: Pt. up with standby assist for line management.   Vital: AVSS on 1L/NC.   Endocrine: BG range: , insulin gtt per algorithm 1- 2.  LDAs: Right internal jugular with straight rated Heparin gtt at 400units/hour, NS at TKO, insulin gtt. G/J tube. JT with tube feeds, GT to gravity with 100ml output.   Cardiac: WNL  Respiratory: Pt. with sleep apnea on 2L via NC with continuous pulse ox. Pt. refusing CPAP.  GI/: Pt. voiding spontaneously with adequate output, 1 loose stool this shift.   Skin: Midline incision stapled & c/d/i  Pain: Abdominal pain managed with Dilaudid pca, on-q paravertebral blocks, sched.   Dilaudid, Toradol, Robaxin & Tylenol.  Nausea: Pt. complain nausea this morning & given citrus aromatherapy with some relief.   Diet: NPO with continuous tube feeds at 50cc/hour into JT. Next Relizorb change at 1230 today.    Labs/Imaging: N/A  Plan: Continue to follow POC &  notify MD with change in status.      "

## 2020-08-02 NOTE — PLAN OF CARE
"BP (!) 143/88 (BP Location: Left arm)   Pulse 93   Temp 99.2  F (37.3  C) (Oral)   Resp 16   Ht 1.651 m (5' 5\")   Wt 71.7 kg (158 lb 1.6 oz)   SpO2 97%   BMI 26.31 kg/m      Patient VSS on 2L per nasal cannula, afebrile. BG , insulin gtt algorithm 2. 25ml d50 given at 2245 BS up to 181. BS remained stable between 105-118 until 2245. On-Q bolused at 2015, PCA settings adjusted per provider to 0.2 q15min lockout of 0.8/hr, scheduled tylenol, robaxin and toradol. Tolerating Tube feeds at 50ml/hr. L. Triple lumen subclavian infusing insulin gtt at 1 unit/hr, heparin gtt at 4 units/hr and TKO.  Voids spontaneously and adequately. Patient is up independently. Continue to manage pain and blood sugars.   Will continue with POC and notify MD with changes or concerns.    "

## 2020-08-02 NOTE — PROGRESS NOTES
"Pancreatitis Service - Daily Progress Note  08/02/2020    Assessment & Plan: Dottie Hernandez is a 54 year old female with a past medical history significant for chronic pancreatitis that began 20 years ago attributed to pancreatic divisum, seasonal allergies, migraines, and ANIBAL. She is now s/p TPAIT and splenectomy with stent placement with Dr. Jarvis on 7/27/20.     Cardiorespiratory:   Hypercapnic respiratory failure: Reintubated on POD#2, likely 2/2 meds and likely underlying sleep apnea. . Extubated again 7/30/20. Plan to utilize CPAP at night. CXR 7/29 shows \"Mild to moderate bilateral pleural effusions, left greater than right. 2. Hazy opacifications of the left lung may represent atelectasis versus infection. Recommend follow-up to clearing.\" Stopped IVF. Continue pulmonary hygiene.  Sleep Apnea: Pt states she was just diagnosed with sleep apnea prior to surgery, was not yet using CPAP. Plan to utilize CPAP at night.  Heme:  Acute blood loss anemia: EBL intra-op 500mL; received 1unit pRBC. Transfusion 7/29 overnight due to hgb of 6. Hgb now trending up 8.4 today, likely representative of fluid shifts.  GI/Nutrition: NG removed. Keep G-tube to gravity, 230 cc out yesterday. Felt nauseous intermittently. On PRN Zofran and Reglan.  Tube feeds per J-tube at 50mL/hr since 8/1. Continue multivitamin.  Fluid/Electrolytes:  Fluids dc'ed ; electrolyte replacements per protocol.   : Armendariz was removed on 7/31, she is self voiding.   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  Infection: Afebrile;   Leukocytosis: WBC 13.7  (15.7), s/p splenectomy. Continue to monitor  Positive islet culture: growing kleb pneumo, continue zosyn, planning for 5 days.   SIRS: Tm 102.5 7/30, now afebrile. Hypotensive at that time, given fluid boluses and started on empiric vanco in addition to zosyn. Lactate elevated at 2.2. Blood cultures with no growth to date. Vanco an " Fluconzole stopped on 7/31. Currently on IV Zonsyn  Prophylaxis: PPI (Protonix), Anticoagulation: heparin drip to 400 units/hr  Pain control: fair, RAPS team following. Current regimen:  -Precedex drip off  -On-Q continuous paravertebral blocks, started on bolus doses q12 hrly as per pain team  - Not using Dilaudid PCA frequently, will stop PCA and start on Dilaudid 0.3-0.5 PRN for breakthrough pain    -Will continue robaxin 500 mg IV every 6 hours  -Toradol 15 mg every 6 hours  -Will schedule tylenol down the J tube  -gabapentin 300mg TID  -ketamine now stopped due to sedation  -lorazepam now stopped due to sedation  -hydroxyzine 25mg Q6H PRN  Activity: as tolerated; PT/OT ordered  Anticipated LOS/Discharge: Will transfer to     Medical Decision Making: High  Subsequent visit 26532 (high level decision making)    ANGELO/Fellow/Resident Provider: Anayeli Lyles PA-C [id number]    Faculty: Steve Jarvis MD  __________________________________________________________________  Transplant History: Admitted 7/27/2020 for TPAIT  7/27/2020 (Islet), Postoperative day: 6     Interval History: History is obtained from the patient  Much more awake and alert today. Starting having bowel movements overnight, urgent and watery. States pain control is suboptimal, she can breath through it as long as she doesn't need to move.    ROS:   A 10-point review of systems was negative except as noted above.    Curent Meds:    acetaminophen  650 mg Per J Tube Q6H     gabapentin  300 mg Oral or J tube Q8H JENNIFER     HYDROmorphone (STANDARD CONC)  2-4 mg Oral Q4H     ketorolac  15 mg Intravenous Q6H     lactated ringers  250 mL Intravenous Once     methocarbamol  500 mg Intravenous Q6H     multivitamins w/minerals  15 mL Oral or Feeding Tube Daily     pantoprazole  40 mg Oral or J tube Daily     piperacillin-tazobactam  3.375 g Intravenous Q6H     disposable pump w/ anesthetic (select flow)   Irrigation Continuous Nerve Block     sodium chloride  "(PF)  3 mL Intracatheter Q8H       Physical Exam:     Admit Weight: 63.1 kg (139 lb 1.8 oz)    Current Vitals:   /85   Pulse 96   Temp 98.7  F (37.1  C)   Resp 16   Ht 1.651 m (5' 5\")   Wt 66.4 kg (146 lb 4.8 oz)   SpO2 96%   BMI 24.35 kg/m      CVP (mmHg): 7 mmHg    Vital sign ranges:    Temp:  [98.3  F (36.8  C)-99.2  F (37.3  C)] 98.7  F (37.1  C)  Pulse:  [96] 96  Heart Rate:  [] 94  Resp:  [16] 16  BP: (127-143)/(76-88) 133/85  SpO2:  [96 %-100 %] 96 %  Patient Vitals for the past 24 hrs:   BP Temp Temp src Pulse Heart Rate Resp SpO2 Weight   08/02/20 0714 133/85 98.7  F (37.1  C) -- 96 -- 16 96 % --   08/02/20 0400 131/85 98.3  F (36.8  C) Oral -- 94 16 100 % --   08/02/20 0126 -- -- -- -- -- -- -- 66.4 kg (146 lb 4.8 oz)   08/02/20 0009 137/76 99.2  F (37.3  C) Oral -- 93 16 100 % --   08/01/20 1931 (!) 143/88 99.2  F (37.3  C) Oral -- 100 16 97 % --   08/01/20 1348 -- -- -- -- -- -- 98 % --   08/01/20 1158 127/79 98.5  F (36.9  C) Oral -- 98 16 99 % --     General Appearance: no acute distress  Skin: normal, warm, dry, no suspicious lesions or rashes  HEENT: G tube to gravity drainage, scant output, though light tan in appearance  Heart: regular rate and rhythm  Lungs: nonlabored resps on   Abdomen: The abdomen is rounded, soft and mildly tender to palpation. The wound is stapled, c/d/i. G tube to gravity. WILMA: serosanguinous  : zaragoza is present- clear yellow output  Extremities: edema: present generalized.     Data:   CMP  Recent Labs   Lab 08/02/20  0743 08/01/20  0557  07/31/20  0416 07/30/20  0456  07/28/20  0354  07/27/20  1708 07/27/20  1503    142  --  144 139   < > 143   < > 141 140   POTASSIUM 3.7 3.6   < > 3.2* 3.5   < > 3.3*   < > 3.7 4.2   CHLORIDE 106 106  --  109 105   < > 112*   < >  --   --    CO2 30 32  --  31 28   < > 27   < >  --   --    * 105*  --  175* 168*   < > 107*   < > 95 134*   BUN 13 7  --  5* 8   < > 7   < >  --   --    CR 0.58 0.51*  --  0.48* " 0.62   < > 0.60   < >  --   --    GFRESTIMATED >90 >90  --  >90 >90   < > >90   < >  --   --    GFRESTBLACK >90 >90  --  >90 >90   < > >90   < >  --   --    MONICA 8.3* 8.2*  --  7.6* 7.2*   < > 7.7*   < >  --   --    ICAW  --   --   --   --   --   --   --   --  4.5 4.5   MAG 2.0  --   --  1.9 1.9   < > 1.7  --   --   --    PHOS 2.9  --   --  2.7 1.4*   < > 2.1*  --   --   --    AMYLASE  --   --   --   --   --   --  76  --   --   --    LIPASE  --   --   --   --   --   --  981*  --   --   --    ALBUMIN  --   --   --   --  1.9*  --  2.7*   < >  --   --    BILITOTAL  --   --   --   --  0.4  --  0.3   < >  --   --    ALKPHOS  --   --   --   --  72  --  39*   < >  --   --    AST  --   --   --   --  22  --  59*   < >  --   --    ALT  --   --   --   --  28  --  52*   < >  --   --     < > = values in this interval not displayed.     CBC  Recent Labs   Lab 08/02/20  0743 07/31/20 0416   HGB 8.4* 7.9*   WBC 13.7* 15.7*   * 350     Coags  Recent Labs   Lab 08/02/20  0743 07/31/20  0416  07/27/20  1915   INR  --   --   --  1.68*   PTT 33 37   < >  --     < > = values in this interval not displayed.      Urinalysis  Recent Labs   Lab Test 07/29/20  0924 07/23/20  0805   COLOR Light Yellow Yellow   APPEARANCE Clear Slightly Cloudy   URINEGLC Negative Negative   URINEBILI Negative Negative   URINEKETONE Negative 5*   SG 1.018 1.020   UBLD Negative Negative   URINEPH 5.5 6.0   PROTEIN Negative Negative   NITRITE Negative Negative   LEUKEST Negative Large*   RBCU 3* 6*   WBCU 3 94*

## 2020-08-03 LAB
ANION GAP SERPL CALCULATED.3IONS-SCNC: 3 MMOL/L (ref 3–14)
APTT PPP: 32 SEC (ref 22–37)
BACTERIA SPEC CULT: ABNORMAL
BASOPHILS # BLD AUTO: 0 10E9/L (ref 0–0.2)
BASOPHILS NFR BLD AUTO: 0 %
BUN SERPL-MCNC: 16 MG/DL (ref 7–30)
CALCIUM SERPL-MCNC: 8.2 MG/DL (ref 8.5–10.1)
CHLORIDE SERPL-SCNC: 105 MMOL/L (ref 94–109)
CO2 SERPL-SCNC: 30 MMOL/L (ref 20–32)
CREAT SERPL-MCNC: 0.56 MG/DL (ref 0.52–1.04)
CRP SERPL-MCNC: 59 MG/L (ref 0–8)
DIFFERENTIAL METHOD BLD: ABNORMAL
EOSINOPHIL # BLD AUTO: 0.8 10E9/L (ref 0–0.7)
EOSINOPHIL NFR BLD AUTO: 5.3 %
ERYTHROCYTE [DISTWIDTH] IN BLOOD BY AUTOMATED COUNT: 14.8 % (ref 10–15)
GFR SERPL CREATININE-BSD FRML MDRD: >90 ML/MIN/{1.73_M2}
GLUCOSE BLDC GLUCOMTR-MCNC: 104 MG/DL (ref 70–99)
GLUCOSE BLDC GLUCOMTR-MCNC: 143 MG/DL (ref 70–99)
GLUCOSE BLDC GLUCOMTR-MCNC: 146 MG/DL (ref 70–99)
GLUCOSE BLDC GLUCOMTR-MCNC: 152 MG/DL (ref 70–99)
GLUCOSE BLDC GLUCOMTR-MCNC: 158 MG/DL (ref 70–99)
GLUCOSE BLDC GLUCOMTR-MCNC: 163 MG/DL (ref 70–99)
GLUCOSE BLDC GLUCOMTR-MCNC: 164 MG/DL (ref 70–99)
GLUCOSE BLDC GLUCOMTR-MCNC: 167 MG/DL (ref 70–99)
GLUCOSE BLDC GLUCOMTR-MCNC: 177 MG/DL (ref 70–99)
GLUCOSE BLDC GLUCOMTR-MCNC: 178 MG/DL (ref 70–99)
GLUCOSE BLDC GLUCOMTR-MCNC: 194 MG/DL (ref 70–99)
GLUCOSE BLDC GLUCOMTR-MCNC: 195 MG/DL (ref 70–99)
GLUCOSE BLDC GLUCOMTR-MCNC: 221 MG/DL (ref 70–99)
GLUCOSE BLDC GLUCOMTR-MCNC: 221 MG/DL (ref 70–99)
GLUCOSE BLDC GLUCOMTR-MCNC: 227 MG/DL (ref 70–99)
GLUCOSE BLDC GLUCOMTR-MCNC: 240 MG/DL (ref 70–99)
GLUCOSE BLDC GLUCOMTR-MCNC: 92 MG/DL (ref 70–99)
GLUCOSE SERPL-MCNC: 117 MG/DL (ref 70–99)
HCT VFR BLD AUTO: 28 % (ref 35–47)
HGB BLD-MCNC: 8.5 G/DL (ref 11.7–15.7)
LACTATE BLD-SCNC: 1 MMOL/L (ref 0.7–2)
LYMPHOCYTES # BLD AUTO: 1.5 10E9/L (ref 0.8–5.3)
LYMPHOCYTES NFR BLD AUTO: 9.6 %
Lab: ABNORMAL
Lab: ABNORMAL
MAGNESIUM SERPL-MCNC: 2.2 MG/DL (ref 1.6–2.3)
MCH RBC QN AUTO: 28.1 PG (ref 26.5–33)
MCHC RBC AUTO-ENTMCNC: 30.4 G/DL (ref 31.5–36.5)
MCV RBC AUTO: 93 FL (ref 78–100)
MONOCYTES # BLD AUTO: 1.2 10E9/L (ref 0–1.3)
MONOCYTES NFR BLD AUTO: 7.9 %
NEUTROPHILS # BLD AUTO: 12 10E9/L (ref 1.6–8.3)
NEUTROPHILS NFR BLD AUTO: 77.2 %
NRBC # BLD AUTO: 2.1 10*3/UL
NRBC BLD AUTO-RTO: 13 /100
PHOSPHATE SERPL-MCNC: 2.8 MG/DL (ref 2.5–4.5)
PLATELET # BLD AUTO: 843 10E9/L (ref 150–450)
PLATELET # BLD EST: ABNORMAL 10*3/UL
POTASSIUM SERPL-SCNC: 3.8 MMOL/L (ref 3.4–5.3)
PREALB SERPL IA-MCNC: 14 MG/DL (ref 15–45)
RBC # BLD AUTO: 3.02 10E12/L (ref 3.8–5.2)
RBC MORPH BLD: NORMAL
SODIUM SERPL-SCNC: 138 MMOL/L (ref 133–144)
SPECIMEN SOURCE: ABNORMAL
SPECIMEN SOURCE: ABNORMAL
WBC # BLD AUTO: 15.6 10E9/L (ref 4–11)

## 2020-08-03 PROCEDURE — 25000132 ZZH RX MED GY IP 250 OP 250 PS 637: Performed by: NURSE PRACTITIONER

## 2020-08-03 PROCEDURE — 83605 ASSAY OF LACTIC ACID: CPT | Performed by: TRANSPLANT SURGERY

## 2020-08-03 PROCEDURE — 36592 COLLECT BLOOD FROM PICC: CPT | Performed by: PHYSICIAN ASSISTANT

## 2020-08-03 PROCEDURE — 25000132 ZZH RX MED GY IP 250 OP 250 PS 637: Performed by: PHYSICIAN ASSISTANT

## 2020-08-03 PROCEDURE — 25000128 H RX IP 250 OP 636: Performed by: PHYSICIAN ASSISTANT

## 2020-08-03 PROCEDURE — 25000131 ZZH RX MED GY IP 250 OP 636 PS 637: Performed by: STUDENT IN AN ORGANIZED HEALTH CARE EDUCATION/TRAINING PROGRAM

## 2020-08-03 PROCEDURE — 36592 COLLECT BLOOD FROM PICC: CPT | Performed by: TRANSPLANT SURGERY

## 2020-08-03 PROCEDURE — 85730 THROMBOPLASTIN TIME PARTIAL: CPT | Performed by: PHYSICIAN ASSISTANT

## 2020-08-03 PROCEDURE — 12000026 ZZH R&B TRANSPLANT

## 2020-08-03 PROCEDURE — 00000146 ZZHCL STATISTIC GLUCOSE BY METER IP

## 2020-08-03 PROCEDURE — 84100 ASSAY OF PHOSPHORUS: CPT | Performed by: PHYSICIAN ASSISTANT

## 2020-08-03 PROCEDURE — 25000128 H RX IP 250 OP 636: Performed by: NURSE PRACTITIONER

## 2020-08-03 PROCEDURE — 80048 BASIC METABOLIC PNL TOTAL CA: CPT | Performed by: PHYSICIAN ASSISTANT

## 2020-08-03 PROCEDURE — 84134 ASSAY OF PREALBUMIN: CPT | Performed by: PHYSICIAN ASSISTANT

## 2020-08-03 PROCEDURE — 27210437 ZZH NUTRITION PRODUCT SEMIELEM INTERMED LITER

## 2020-08-03 PROCEDURE — 86140 C-REACTIVE PROTEIN: CPT | Performed by: PHYSICIAN ASSISTANT

## 2020-08-03 PROCEDURE — 27110038 ZZH RX 271

## 2020-08-03 PROCEDURE — 83735 ASSAY OF MAGNESIUM: CPT | Performed by: PHYSICIAN ASSISTANT

## 2020-08-03 PROCEDURE — 25000131 ZZH RX MED GY IP 250 OP 636 PS 637: Performed by: CLINICAL NURSE SPECIALIST

## 2020-08-03 PROCEDURE — 85025 COMPLETE CBC W/AUTO DIFF WBC: CPT | Performed by: PHYSICIAN ASSISTANT

## 2020-08-03 PROCEDURE — 25000125 ZZHC RX 250

## 2020-08-03 PROCEDURE — 25000131 ZZH RX MED GY IP 250 OP 636 PS 637

## 2020-08-03 RX ORDER — HYDROMORPHONE HYDROCHLORIDE 5 MG/5ML
4-6 SOLUTION ORAL EVERY 4 HOURS
Status: DISCONTINUED | OUTPATIENT
Start: 2020-08-03 | End: 2020-08-06 | Stop reason: HOSPADM

## 2020-08-03 RX ORDER — BISACODYL 10 MG
10 SUPPOSITORY, RECTAL RECTAL DAILY PRN
Status: DISCONTINUED | OUTPATIENT
Start: 2020-08-03 | End: 2020-08-06 | Stop reason: HOSPADM

## 2020-08-03 RX ORDER — METHOCARBAMOL 500 MG/1
500 TABLET, FILM COATED ORAL EVERY 6 HOURS
Status: DISCONTINUED | OUTPATIENT
Start: 2020-08-03 | End: 2020-08-05

## 2020-08-03 RX ORDER — LORAZEPAM 2 MG/ML
1 CONCENTRATE ORAL EVERY 12 HOURS PRN
Status: DISCONTINUED | OUTPATIENT
Start: 2020-08-03 | End: 2020-08-05

## 2020-08-03 RX ORDER — LIDOCAINE 4 G/G
1 PATCH TOPICAL
Status: DISCONTINUED | OUTPATIENT
Start: 2020-08-03 | End: 2020-08-06 | Stop reason: HOSPADM

## 2020-08-03 RX ADMIN — METHOCARBAMOL 500 MG: 100 INJECTION, SOLUTION INTRAMUSCULAR; INTRAVENOUS at 06:50

## 2020-08-03 RX ADMIN — Medication: at 08:05

## 2020-08-03 RX ADMIN — HEPARIN SODIUM 400 UNITS/HR: 10000 INJECTION, SOLUTION INTRAVENOUS at 07:45

## 2020-08-03 RX ADMIN — GABAPENTIN 300 MG: 250 SUSPENSION ORAL at 22:04

## 2020-08-03 RX ADMIN — HYDROMORPHONE HYDROCHLORIDE 4 MG: 1 SOLUTION ORAL at 19:05

## 2020-08-03 RX ADMIN — Medication: at 11:00

## 2020-08-03 RX ADMIN — MULTIVITAMIN 15 ML: LIQUID ORAL at 12:32

## 2020-08-03 RX ADMIN — Medication 40 MG: at 11:33

## 2020-08-03 RX ADMIN — PIPERACILLIN AND TAZOBACTAM 3.38 G: 3; .375 INJECTION, POWDER, FOR SOLUTION INTRAVENOUS at 16:36

## 2020-08-03 RX ADMIN — HYDROMORPHONE HYDROCHLORIDE 0.3 MG: 1 INJECTION, SOLUTION INTRAMUSCULAR; INTRAVENOUS; SUBCUTANEOUS at 19:35

## 2020-08-03 RX ADMIN — KETOROLAC TROMETHAMINE 15 MG: 15 INJECTION, SOLUTION INTRAMUSCULAR; INTRAVENOUS at 00:16

## 2020-08-03 RX ADMIN — HYDROMORPHONE HYDROCHLORIDE 4 MG: 1 SOLUTION ORAL at 06:50

## 2020-08-03 RX ADMIN — HYDROMORPHONE HYDROCHLORIDE 4 MG: 1 SOLUTION ORAL at 11:01

## 2020-08-03 RX ADMIN — LORAZEPAM 1 MG: 2 SOLUTION, CONCENTRATE ORAL at 13:09

## 2020-08-03 RX ADMIN — ONDANSETRON 4 MG: 2 INJECTION INTRAMUSCULAR; INTRAVENOUS at 00:00

## 2020-08-03 RX ADMIN — HYDROMORPHONE HYDROCHLORIDE 0.5 MG: 1 INJECTION, SOLUTION INTRAMUSCULAR; INTRAVENOUS; SUBCUTANEOUS at 09:02

## 2020-08-03 RX ADMIN — SENNOSIDES 5 ML: 8.8 LIQUID ORAL at 21:55

## 2020-08-03 RX ADMIN — INSULIN ASPART 3 UNITS: 100 INJECTION, SOLUTION INTRAVENOUS; SUBCUTANEOUS at 13:24

## 2020-08-03 RX ADMIN — HYDROXYZINE HYDROCHLORIDE 25 MG: 10 SYRUP ORAL at 10:37

## 2020-08-03 RX ADMIN — PIPERACILLIN AND TAZOBACTAM 3.38 G: 3; .375 INJECTION, POWDER, FOR SOLUTION INTRAVENOUS at 10:08

## 2020-08-03 RX ADMIN — ONDANSETRON 4 MG: 2 INJECTION INTRAMUSCULAR; INTRAVENOUS at 10:52

## 2020-08-03 RX ADMIN — HYDROMORPHONE HYDROCHLORIDE 0.5 MG: 1 INJECTION, SOLUTION INTRAMUSCULAR; INTRAVENOUS; SUBCUTANEOUS at 04:19

## 2020-08-03 RX ADMIN — KETOROLAC TROMETHAMINE 15 MG: 15 INJECTION, SOLUTION INTRAMUSCULAR; INTRAVENOUS at 06:11

## 2020-08-03 RX ADMIN — GABAPENTIN 300 MG: 250 SUSPENSION ORAL at 15:13

## 2020-08-03 RX ADMIN — HYDROMORPHONE HYDROCHLORIDE 0.2 MG: 1 INJECTION, SOLUTION INTRAMUSCULAR; INTRAVENOUS; SUBCUTANEOUS at 22:28

## 2020-08-03 RX ADMIN — HYDROMORPHONE HYDROCHLORIDE 4 MG: 1 SOLUTION ORAL at 23:01

## 2020-08-03 RX ADMIN — KETOROLAC TROMETHAMINE 15 MG: 15 INJECTION, SOLUTION INTRAMUSCULAR; INTRAVENOUS at 12:27

## 2020-08-03 RX ADMIN — KETOROLAC TROMETHAMINE 15 MG: 15 INJECTION, SOLUTION INTRAMUSCULAR; INTRAVENOUS at 18:03

## 2020-08-03 RX ADMIN — HYDROMORPHONE HYDROCHLORIDE 4 MG: 1 SOLUTION ORAL at 15:13

## 2020-08-03 RX ADMIN — HYDROMORPHONE HYDROCHLORIDE 4 MG: 1 SOLUTION ORAL at 04:03

## 2020-08-03 RX ADMIN — HYDROXYZINE HYDROCHLORIDE 25 MG: 10 SYRUP ORAL at 22:04

## 2020-08-03 RX ADMIN — INSULIN ASPART 3 UNITS: 100 INJECTION, SOLUTION INTRAVENOUS; SUBCUTANEOUS at 20:24

## 2020-08-03 RX ADMIN — METHOCARBAMOL 500 MG: 500 TABLET, FILM COATED ORAL at 19:05

## 2020-08-03 RX ADMIN — METHOCARBAMOL 500 MG: 100 INJECTION, SOLUTION INTRAMUSCULAR; INTRAVENOUS at 01:13

## 2020-08-03 RX ADMIN — GABAPENTIN 300 MG: 250 SUSPENSION ORAL at 06:11

## 2020-08-03 RX ADMIN — ACETAMINOPHEN 650 MG: 325 SOLUTION ORAL at 17:16

## 2020-08-03 RX ADMIN — INSULIN GLARGINE 29 UNITS: 100 INJECTION, SOLUTION SUBCUTANEOUS at 09:10

## 2020-08-03 RX ADMIN — INSULIN ASPART 3 UNITS: 100 INJECTION, SOLUTION INTRAVENOUS; SUBCUTANEOUS at 16:48

## 2020-08-03 RX ADMIN — METHOCARBAMOL 500 MG: 500 TABLET, FILM COATED ORAL at 13:09

## 2020-08-03 RX ADMIN — PIPERACILLIN AND TAZOBACTAM 3.38 G: 3; .375 INJECTION, POWDER, FOR SOLUTION INTRAVENOUS at 01:59

## 2020-08-03 RX ADMIN — PIPERACILLIN AND TAZOBACTAM 3.38 G: 3; .375 INJECTION, POWDER, FOR SOLUTION INTRAVENOUS at 22:04

## 2020-08-03 ASSESSMENT — ACTIVITIES OF DAILY LIVING (ADL)
ADLS_ACUITY_SCORE: 14

## 2020-08-03 ASSESSMENT — MIFFLIN-ST. JEOR: SCORE: 1254.98

## 2020-08-03 NOTE — PROVIDER NOTIFICATION
Sudden onset of pain and nausea. Pain noted by pt. To be in mid back neat exit sites of On-Q  Also nauseated;   Notified Briana ACEVEDO  G-tube was flushed; is patent.  Bloating noted; not passing gas via the tube or rectally.  No emesis.  Able to relax after 15 minutes or so.  No real intervention, other than repositioning and a warm blanket.  Plan: possibly a laxative; lidocaine patch; awaiting orders.

## 2020-08-03 NOTE — PROGRESS NOTES
SPIRITUAL HEALTH SERVICES: Tele-Encounter  Patient Location: [facility] ([place]) 7A    Referral Source: follow-up request per  note.    If applicable: patient was appropriately screened for telechaplaincy support with bedside nurse prior to visit (e.g. Mental Health and Addiction contexts). See call details below.    VISIT DATA (Interventions/Outcomes):      -initiated visit attempt via phone two times. Patient was not available either time due to being asleep.    PLAN: I will follow-up with the patient while she is on the unit.    Ramón Mcleod   Intern  Voicemail: [phone]  ______________________________    Type of service:  Telephone Visit/Video Visit     has received verbal consent for a Televisit from the patient? No    Distance Provider Location: designated [place] office or home office (secure setting)    Mode of Communication: telephone (via [facility] phone or Call [facility] tele-call-number ([phone]))    *[facility] remains available 24/7 for emergent requests/referrals, either by having the switchboard page the on-call  or by entering an ASAP/STAT consult in Epic (this will also page the on-call ). Routine Epic consults receive an initial response within 24 hours.*

## 2020-08-03 NOTE — PLAN OF CARE
VS: stable room air.  Neuro: oriented x 4.   BG: insulin drip. Alg 1-2. BG between 143-221.  Pain: scheduled tylenol refused due to it causing nausea. Given scheduled toradol, robaxin. Given PRN dil x 1, effective.   Nausea: x 1 zofran. Suspension via J tube makes pt very nauseas, slow pushing meds helps.   Diet: NPO, ice chips, on TF @ 50 GR continous, relizorb changed @ 0200, due 1400.   Lines: TL access infusing hep SR, insulin TKO NS and TKO NS to abx, given zosyn x 1.  : x 2.   GI: no BM. Last BM yesterday.  Mobility: up with easy SBA.       Continue to monitor.

## 2020-08-03 NOTE — PROGRESS NOTES
Care Coordinator  D: Dottie Hernandez is a 54 year old female with a past medical history significant for chronic pancreatitis that began 20 years ago attributed to pancreatic divisum, seasonal allergies, migraines, and ANIBAL. She is now s/p TPAIT and splenectomy with stent placement with Dr. Jarvis on 7/27/20--note per DR Rush Snyder on 8/1.  I: I called [facility]: June [phone] to check enteral coverage--Pt has [employer] and to meet criteria for enteral feeds, they must meet 50% of nutritional need for formula to be covered. Has a $250 ded (met) and then 20% coinsurance up to $2500 max OOP (met $930) so far--so is 20% coinsurance now. [place] will send her to their nutrition team.  A: Per chart review, pt is day 7 and still on Dilaudid PCA/toradol every 6 hours--Insulin gtt and pv block to be stopped today.  P: Pt is on Relizorb cartridges--[facility]: Gena Marshall (to bring them for discharge).  PLC scheduled for Wednesday, 8/5 @ 1:30pm.

## 2020-08-03 NOTE — PROGRESS NOTES
"Pancreatitis Service - Daily Progress Note  08/03/2020    Assessment & Plan: Dottie Hernandez is a 54 year old female with a past medical history significant for chronic pancreatitis that began 20 years ago attributed to pancreatic divisum, seasonal allergies, migraines, and ANIBAL. She is now s/p TPAIT and splenectomy with stent placement with Dr. Jarvis on 7/27/20.     Cardiorespiratory:   Hypercapnic respiratory failure: Reintubated on POD#2, likely 2/2 meds and likely underlying sleep apnea. . Extubated again 7/30/20. Plan to utilize CPAP at night. CXR 7/29 shows \"Mild to moderate bilateral pleural effusions, left greater than right. 2. Hazy opacifications of the left lung may represent atelectasis versus infection. Recommend follow-up to clearing. Stopped IVF. Continue pulmonary hygiene.  Sleep Apnea: Pt states she was just diagnosed with sleep apnea prior to surgery, was not yet using CPAP. Plan to utilize CPAP at night.  Heme:  Acute blood loss anemia: EBL intra-op 500mL; received 1unit pRBC. Transfusion 7/29 overnight due to hgb of 6. Hgb remains stable 8.5 today  GI/Nutrition: NG removed. G-tube to gravity, 100 cc out yesterday. Felt nauseous intermittently. Will start clamp trials with gravity drainage overnight. On PRN Zofran and Reglan. Tube feeds @ goal, per J-tube at 50mL/hr since 8/1. Continue multivitamin.  Fluid/Electrolytes:  CIV ; electrolyte replacements per protocol.   : Armendariz was removed on 7/31, voiding  Post-pancreatectomy diabetes: Insulin drip, BG , start lantus 29 units today. Plan to transition off drip. Endocrine following.  Infection: Afebrile;   Leukocytosis: WBC 15.6(13.7), s/p splenectomy. Continue to monitor  Positive islet culture: growing kleb pneumo, continue zosyn.   SIRS: Tm 102.5 7/30, now afebrile. Hypotensive at that time, given fluid boluses and started on empiric vanco in addition to zosyn. Lactate elevated at 2.2. Blood cultures with no growth to date. Vanco an " Fluconzole stopped on 7/31. Remains on IV Zonsyn, LA 0.9 (8/2) transition to Augmentin 8/4 to complete 14 days of therapy  Prophylaxis: PPI (Protonix), Anticoagulation: heparin drip to 400 units/hr-stop today  Pain control: adequate, RAPS team following. Current regimen:  -Precedex drip off  -On-Q continuous paravertebral blocks, started on bolus doses q12 hrly as per pain team-plan to remove today  -Dilaudid PCA stopped 8/2, dilaudid JT 2-4 Q4, increase to 4-6 Q4 with discontinuation of block. Dilaudid 0.3-0.5 PRN for breakthrough pain    -robaxin 500 mg IV every 6 hours-change to po  -Toradol 15 mg every 6 hours x5 days 7/30-8/3  -Will schedule tylenol down the J tube  -gabapentin 300mg TID  -ketamine , stopped due to sedation  -lorazepam, stopped due to sedation  -ativan 1mg Q12 PRN, start today  -hydroxyzine 25mg Q6H PRN  Activity: as tolerated; PT/OT ordered  Anticipated LOS/Discharge:  7A, plan for discharge Thursday 8/6    Medical Decision Making:   Medium  Subsequent visit 58443 (moderate level decision making)      ANGELO/Fellow/Resident Provider: Briana Lockwood, NP [id number]    Faculty: Steve Jarvis MD  __________________________________________________________________  Transplant History: Admitted 7/27/2020 for TPAIT  7/27/2020 (Islet), Postoperative day: 7     Interval History: History is obtained from the patient  Awake, alert. Ambulating. Nausea/vomiting x1 yesterday-believes related to acetaminophen. Having bowel movements. Pain control is adequate.     ROS:   A 10-point review of systems was negative except as noted above.    Curent Meds:    acetaminophen  650 mg Per J Tube Q6H     gabapentin  300 mg Oral or J tube Q8H JENNIFER     HYDROmorphone (STANDARD CONC)  2-4 mg Oral Q4H     insulin aspart  1-6 Units Subcutaneous Q4H     insulin glargine  29 Units Subcutaneous Daily     ketorolac  15 mg Intravenous Q6H     lactated ringers  250 mL Intravenous Once     multivitamins w/minerals  15 mL Oral or Feeding  "Tube Daily     pantoprazole  40 mg Oral or J tube Daily     piperacillin-tazobactam  3.375 g Intravenous Q6H     disposable pump w/ anesthetic (select flow)   Irrigation Continuous Nerve Block     sodium chloride (PF)  3 mL Intracatheter Q8H       Physical Exam:     Admit Weight: 63.1 kg (139 lb 1.8 oz)    Current Vitals:   BP (!) 152/86 (BP Location: Right arm)   Pulse 96   Temp 98.3  F (36.8  C) (Oral)   Resp 16   Ht 1.651 m (5' 5\")   Wt 64.9 kg (143 lb 1.6 oz)   SpO2 95%   BMI 23.81 kg/m      CVP (mmHg): 7 mmHg    Vital sign ranges:    Temp:  [97.4  F (36.3  C)-98.9  F (37.2  C)] 98.3  F (36.8  C)  Heart Rate:  [] 91  Resp:  [16] 16  BP: (133-152)/(83-94) 152/86  SpO2:  [95 %-99 %] 95 %  Patient Vitals for the past 24 hrs:   BP Temp Temp src Heart Rate Resp SpO2 Weight   08/03/20 0800 (!) 152/86 98.3  F (36.8  C) Oral 91 16 95 % --   08/03/20 0500 -- -- -- -- -- -- 64.9 kg (143 lb 1.6 oz)   08/03/20 0413 (!) 143/94 98.8  F (37.1  C) Oral 93 16 96 % --   08/02/20 2259 (!) 142/85 97.4  F (36.3  C) Oral 90 16 99 % --   08/02/20 2004 (!) 142/85 98.9  F (37.2  C) Oral 95 16 97 % --   08/02/20 1542 133/83 98.7  F (37.1  C) Oral 99 16 95 % --   08/02/20 1128 (!) 144/84 98.8  F (37.1  C) Oral 103 16 97 % --     General Appearance: no acute distress, resting comfortably in bed  Skin: normal, warm, dry, no suspicious lesions or rashes  Heart: regular rate and rhythm  Lungs: nonlabored resps on RA  Abdomen: The abdomen is rounded, soft and mildly tender to palpation. The wound is stapled, c/d/i. G tube to gravity drainage with tan output with sediment. WILMA x1 with  Serosanguinous output  : Voiding  Extremities: edema:none.   Neuro: Awake, alert, oriented x3    Data:   CMP  Recent Labs   Lab 08/03/20  0557 08/02/20  0743  07/30/20  0456  07/28/20  0354  07/27/20  1708 07/27/20  1503    140   < > 139   < > 143   < > 141 140   POTASSIUM 3.8 3.7   < > 3.5   < > 3.3*   < > 3.7 4.2   CHLORIDE 105 106   < > 105 "   < > 112*   < >  --   --    CO2 30 30   < > 28   < > 27   < >  --   --    * 142*   < > 168*   < > 107*   < > 95 134*   BUN 16 13   < > 8   < > 7   < >  --   --    CR 0.56 0.58   < > 0.62   < > 0.60   < >  --   --    GFRESTIMATED >90 >90   < > >90   < > >90   < >  --   --    GFRESTBLACK >90 >90   < > >90   < > >90   < >  --   --    MONICA 8.2* 8.3*   < > 7.2*   < > 7.7*   < >  --   --    ICAW  --   --   --   --   --   --   --  4.5 4.5   MAG 2.2 2.0   < > 1.9   < > 1.7  --   --   --    PHOS 2.8 2.9   < > 1.4*   < > 2.1*  --   --   --    AMYLASE  --   --   --   --   --  76  --   --   --    LIPASE  --   --   --   --   --  981*  --   --   --    ALBUMIN  --   --   --  1.9*  --  2.7*   < >  --   --    BILITOTAL  --   --   --  0.4  --  0.3   < >  --   --    ALKPHOS  --   --   --  72  --  39*   < >  --   --    AST  --   --   --  22  --  59*   < >  --   --    ALT  --   --   --  28  --  52*   < >  --   --     < > = values in this interval not displayed.     CBC  Recent Labs   Lab 08/03/20 0557 08/02/20  0743   HGB 8.5* 8.4*   WBC 15.6* 13.7*   * 668*     Coags  Recent Labs   Lab 08/03/20  0557 08/02/20  0743  07/27/20  1915   INR  --   --   --  1.68*   PTT 32 33   < >  --     < > = values in this interval not displayed.      Urinalysis  Recent Labs   Lab Test 07/29/20  0924 07/23/20  0805   COLOR Light Yellow Yellow   APPEARANCE Clear Slightly Cloudy   URINEGLC Negative Negative   URINEBILI Negative Negative   URINEKETONE Negative 5*   SG 1.018 1.020   UBLD Negative Negative   URINEPH 5.5 6.0   PROTEIN Negative Negative   NITRITE Negative Negative   LEUKEST Negative Large*   RBCU 3* 6*   WBCU 3 94*

## 2020-08-03 NOTE — PROGRESS NOTES
REGIONAL ANESTHESIA PAIN SERVICE FOLLOW UP NOTE  Dottie Hernandez is a 55 year old female POD #7 s/p C LAP,ABD/PERIT/OMENTUM,UNLIST [13843] (Laproscopipc PANCREATECTOMY, TOTAL, WITH AUTOLOGOUS PANCREATIC ISLET CELL TRANSPLANT, Splenectomy, gastrojejunostomy placement) and placement of bilateral T8-9 paravertebral (PV) catheters for postoperative pain management.    Subjective and Interval History: Overnight no acute events.  Patient reports intermittent pain, tolerating liquid scheduled dosing Q 4 hr Dilaudid down GJ tube, and received one dose of Dilaudid IV this AM for pain.  Denies LE weakness, paresthesias, circumoral numbness, metallic taste or tinnitus.  Has ambulated with standby assistance.  Tolerating tube feedings.  Plan is to remove nerve block catheters today      Antithrombotic/Thrombolytic Therapy: Heparin infusion stopped at 0805 today      Medications related to Pain Management (From now, onward)    Start     Dose/Rate Route Frequency Ordered Stop    08/02/20 1036  HYDROmorphone (PF) (DILAUDID) injection 0.3-0.5 mg      0.3-0.5 mg Intravenous EVERY 4 HOURS PRN 08/02/20 1036      08/01/20 1715  HYDROmorphone (STANDARD CONC) (DILAUDID) liquid 2-4 mg      2-4 mg Oral EVERY 4 HOURS 08/01/20 1705      07/31/20 1200  acetaminophen (TYLENOL) solution 650 mg      650 mg Per J Tube EVERY 6 HOURS 07/31/20 1024      07/30/20 1400  gabapentin (NEURONTIN) solution 300 mg      300 mg Oral or J tube EVERY 8 HOURS SCHEDULED 07/30/20 0849      07/30/20 1230  ketorolac (TORADOL) injection 15 mg      15 mg Intravenous EVERY 6 HOURS 07/30/20 1217 08/04/20 1229    07/27/20 2200  lidocaine 1 % 0.1-1 mL      0.1-1 mL Other EVERY 1 HOUR PRN 07/27/20 2200      07/27/20 2200  lidocaine (LMX4) cream       Topical EVERY 1 HOUR PRN 07/27/20 2200      07/27/20 2200  hydrOXYzine (ATARAX) syrup 25 mg      25 mg Oral EVERY 6 HOURS PRN 07/27/20 2200      07/27/20 0930  ropivacaine 0.2% (NAROPIN) 750 mL in ON-Q C-Bloc select flow  "([id number] holds 600-750 mL) dual cath disposable pump      14 mL/hr  Irrigation Continuous Nerve Block 07/27/20 0928              Objective  Lab Results     Recent Labs   Lab Test 08/03/20  0557   WBC 15.6*   RBC 3.02*   HGB 8.5*   HCT 28.0*   MCV 93   MCH 28.1   MCHC 30.4*   RDW 14.8   *     Lab Results   Component Value Date    INR 1.68 07/27/2020    INR 1.06 07/23/2020       Vitals: Blood pressure (!) 152/86, pulse 96, temperature 98.3  F (36.8  C), temperature source Oral, resp. rate 16, height 1.651 m (5' 5\"), weight 64.9 kg (143 lb 1.6 oz), SpO2 95 %, not currently breastfeeding.    Exam:  Constitutional: alert and no distress  Neuro/MSK:  Full strength and moving upper and lower extremities  Skin: paravertebral (PV) sites c/d/i, no tenderness, erythema, heme, edema.      Assessment  Patient currently achieving adequate pain control with paravertebral (PV) catheter/infusion and multimodal analgesia.  Patient is  Tolerating medications and meeting activity goals. No weakness or paresthesias. No evidence of adverse side effects associated with local anesthetic.     Plan  1213 bilateral paravertebral (PV) removed without complication, dark tips intact.  Insertion site c/d/i. Small bandage applied  - okay to resume heparin infusion at 1315, a minimum of 1 after catheter removal.    - Primary service and Bedside RN aware of plans.  - RAPS will sign off    Thank you for allowing us the opportunity to participate in the care of Dottie Hernandez  - discussed plan with attending anesthesiologist    JUJU Dhaliwal [facility]   Regional Anesthesia Pain Service      RAPS Contact Info (for in-house use only):  Job code ID: [id number]   [place] 0599  Peds [phone]  [place] phone: dial * * * [phone], enter jobcode ID, then enter call-back number.    Text: Use [employer] on the Intranet <Paging/Directory> tab and enter Jobcode ID.   If no call back at any time, contact the hospital  and ask for RAPS attending or " backup

## 2020-08-03 NOTE — PLAN OF CARE
"BP (!) 142/85 (BP Location: Left arm)   Pulse 96   Temp 98.9  F (37.2  C) (Oral)   Resp 16   Ht 1.651 m (5' 5\")   Wt 66.4 kg (146 lb 4.8 oz)   SpO2 97%   BMI 24.35 kg/m      Patient VSS on 2L O2 when asleep, afebrile. -138, 2235 sugar dropped to 62, insulin stopped and IV dextrose 25ml administered, rechecked at 91 insulin restarted at 0.5 units alg 2. Pain controlled with scheduled pain medication and On-Q bolus, intermittent nausea controlled with zofran. Tolerating tube feed diet at 50ml/hr. Triple lumen internal jugular heparin gtt at 4units, insulin gtt and TKO.  Voids spontaneously and adequately. Stand by assist to bathroom. Plan for On-Q to be removed tomorrow morning, must hold heparin for 4 hours prior.   Will continue with POC and notify MD with changes or concerns.   "

## 2020-08-03 NOTE — PROGRESS NOTES
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 Jarvis 7/27/2020 (872 IE/kg), post-op course complicated by Somnolence, for which she was Re-intubated 7/29, then extubated 7/30.        Total pancreatectomy with auto-islet cell transplant : A1C 6% on (7/23/2020) indicating pre-diabetes most likely 2/2 chronic pancreatitis     Plan  - started on glargine 29 units qAM.  Expect to add PM dose  - stop IV insulin 2 - 4 hours after first dose glargine  - aspart custom scale 1 per 40 for BG>120, q4h  -goal glucose   -hypoglycemia protocol  -D10W PRN for hypoglyc prevention in case of TF interruptions               -diabetes education will be bedside in next couple days       Outpatient diabetes follow up: TBD--none currently scheduled.  Pt anticipates follow up with Dr. López while staying locally, then to establish with endocrinologist near her home in [place] (her GI team will refer her).  Does not have appts with diab educ RN or RD.      Discussed w/ pt, brother, RN, primary team, CDE           Interval History:   The last 24 hours progress and nursing notes reviewed.    Impact Peptide 1.5 at 50 ml.h since 1400 on 8/1. +BM.  Dextrose fluids decreased to 10 ml/hour 7/31  Working on increasing activity, aiming for 2-3 walks/day.  Anxious this morning with many changes today-- pain mgmnt, glucose mgmnt.  Starting g-tube clamping.    Is receiving medications in solution ( gabapentin, multivitamins, and prn Phos replacement)  Prior to noon glucose, had received hydroxyzine and hydromorphone elixir/solutions.  Hydroxyzine soln is notably large volume- 20 ml.    Relizorb will be changed at 1430.    Discussed basal bolus insulin, expected adjustments.  Pt aware of different actions/schedules of insulin.  She was happy to think  "of the improved sleep she might get off IV insulin freq BGs and that transition to subcut is a sign of progress.        Nutrition:   Impact peptide 1.5 via jejunostomy  goal 50 ml/hour      Recent Labs   Lab 08/03/20  0959 08/03/20  0856 08/03/20  0754 08/03/20  0657 08/03/20  0609 08/03/20  0557 08/03/20  0506  08/02/20  0743  08/01/20  0557  07/31/20  0416  07/30/20  0456  07/29/20  2236   GLC  --   --   --   --   --  117*  --   --  142*  --  105*  --  175*  --  168*  --  248*   * 178* 146* 104* 92  --  163*   < >  --    < >  --    < > 175*   < >  --    < >  --     < > = values in this interval not displayed.               Review of Systems:   See interval hx          Medications impacting glucose:   gabapentin, multivitamins, and prn Phos replacement)      None                Physical Exam:     BP (!) 152/86 (BP Location: Right arm)   Pulse 96   Temp 98.3  F (36.8  C) (Oral)   Resp 16   Ht 1.651 m (5' 5\")   Wt 64.9 kg (143 lb 1.6 oz)   SpO2 95%   BMI 23.81 kg/m       General:  pleasant slightly anxious woman resting in bed, in no acute distress.  Brother Will at bedside  HEENT: NC/AT, PER and anicteric, non-injected, oral mucous membranes moist.   Lungs: unlabored respiration, no cough  ABD: rounded, g-tuibe to gravity  Skin: warm and dry, no obvious lesions  MSK:  fluid movement of all extremities  Mental status:  alert, oriented x3, communicating clearly  Psych:  calm, easily engaged    BP (!) 152/86 (BP Location: Right arm)   Pulse 96   Temp 98.3  F (36.8  C) (Oral)   Resp 16   Ht 1.651 m (5' 5\")   Wt 64.9 kg (143 lb 1.6 oz)   SpO2 95%   BMI 23.81 kg/m             Data:     Lab Results   Component Value Date    A1C 6.0 07/23/2020            Recent Labs   Lab Test 08/03/20  0557 08/02/20  0743    140   POTASSIUM 3.8 3.7   CHLORIDE 105 106   CO2 30 30   ANIONGAP 3 4   * 142*   BUN 16 13   CR 0.56 0.58   MONICA 8.2* 8.3*     CBC RESULTS:   Recent Labs   Lab Test 08/03/20  0557   WBC " 15.6*   RBC 3.02*   HGB 8.5*   HCT 28.0*   MCV 93   MCH 28.1   MCHC 30.4*   RDW 14.8   *   I spent a total of 35 minutes bedside and on the inpatient unit managing the glycemic care of Dottie Hernandez. Over 50% of my time on the unit was spent counseling the patient and brother and/or coordinating care regarding acute glucose management.  See note for details.    Dione Madrid APRN -[id number]  Diabetes Management job code [id number]

## 2020-08-04 ENCOUNTER — APPOINTMENT (OUTPATIENT)
Dept: PHYSICAL THERAPY | Facility: CLINIC | Age: 55
End: 2020-08-04
Attending: TRANSPLANT SURGERY
Payer: COMMERCIAL

## 2020-08-04 LAB
AMYLASE SERPL-CCNC: 12 U/L (ref 30–110)
ANION GAP SERPL CALCULATED.3IONS-SCNC: 5 MMOL/L (ref 3–14)
APTT PPP: 30 SEC (ref 22–37)
BACTERIA SPEC CULT: NO GROWTH
BACTERIA SPEC CULT: NO GROWTH
BASOPHILS # BLD AUTO: 0.3 10E9/L (ref 0–0.2)
BASOPHILS NFR BLD AUTO: 1.8 %
BUN SERPL-MCNC: 18 MG/DL (ref 7–30)
CALCIUM SERPL-MCNC: 8.3 MG/DL (ref 8.5–10.1)
CHLORIDE SERPL-SCNC: 104 MMOL/L (ref 94–109)
CO2 SERPL-SCNC: 31 MMOL/L (ref 20–32)
CREAT SERPL-MCNC: 0.6 MG/DL (ref 0.52–1.04)
DIFFERENTIAL METHOD BLD: ABNORMAL
EOSINOPHIL # BLD AUTO: 1.7 10E9/L (ref 0–0.7)
EOSINOPHIL NFR BLD AUTO: 11.5 %
ERYTHROCYTE [DISTWIDTH] IN BLOOD BY AUTOMATED COUNT: 15.2 % (ref 10–15)
GFR SERPL CREATININE-BSD FRML MDRD: >90 ML/MIN/{1.73_M2}
GLUCOSE BLDC GLUCOMTR-MCNC: 102 MG/DL (ref 70–99)
GLUCOSE BLDC GLUCOMTR-MCNC: 107 MG/DL (ref 70–99)
GLUCOSE BLDC GLUCOMTR-MCNC: 121 MG/DL (ref 70–99)
GLUCOSE BLDC GLUCOMTR-MCNC: 123 MG/DL (ref 70–99)
GLUCOSE BLDC GLUCOMTR-MCNC: 124 MG/DL (ref 70–99)
GLUCOSE BLDC GLUCOMTR-MCNC: 124 MG/DL (ref 70–99)
GLUCOSE BLDC GLUCOMTR-MCNC: 151 MG/DL (ref 70–99)
GLUCOSE BLDC GLUCOMTR-MCNC: 156 MG/DL (ref 70–99)
GLUCOSE BLDC GLUCOMTR-MCNC: 168 MG/DL (ref 70–99)
GLUCOSE BLDC GLUCOMTR-MCNC: 176 MG/DL (ref 70–99)
GLUCOSE BLDC GLUCOMTR-MCNC: 187 MG/DL (ref 70–99)
GLUCOSE BLDC GLUCOMTR-MCNC: 206 MG/DL (ref 70–99)
GLUCOSE BLDC GLUCOMTR-MCNC: 73 MG/DL (ref 70–99)
GLUCOSE BLDC GLUCOMTR-MCNC: 79 MG/DL (ref 70–99)
GLUCOSE BLDC GLUCOMTR-MCNC: 83 MG/DL (ref 70–99)
GLUCOSE BLDC GLUCOMTR-MCNC: 87 MG/DL (ref 70–99)
GLUCOSE BLDC GLUCOMTR-MCNC: 88 MG/DL (ref 70–99)
GLUCOSE BLDC GLUCOMTR-MCNC: 90 MG/DL (ref 70–99)
GLUCOSE BLDC GLUCOMTR-MCNC: 93 MG/DL (ref 70–99)
GLUCOSE BLDC GLUCOMTR-MCNC: 97 MG/DL (ref 70–99)
GLUCOSE BLDC GLUCOMTR-MCNC: 98 MG/DL (ref 70–99)
GLUCOSE SERPL-MCNC: 92 MG/DL (ref 70–99)
HCT VFR BLD AUTO: 28.2 % (ref 35–47)
HGB BLD-MCNC: 8.6 G/DL (ref 11.7–15.7)
LIPASE SERPL-CCNC: 39 U/L (ref 73–393)
LYMPHOCYTES # BLD AUTO: 2.8 10E9/L (ref 0.8–5.3)
LYMPHOCYTES NFR BLD AUTO: 18.6 %
MAGNESIUM SERPL-MCNC: 2.1 MG/DL (ref 1.6–2.3)
MCH RBC QN AUTO: 28.1 PG (ref 26.5–33)
MCHC RBC AUTO-ENTMCNC: 30.5 G/DL (ref 31.5–36.5)
MCV RBC AUTO: 92 FL (ref 78–100)
MONOCYTES # BLD AUTO: 0.5 10E9/L (ref 0–1.3)
MONOCYTES NFR BLD AUTO: 3.5 %
NEUTROPHILS # BLD AUTO: 9.8 10E9/L (ref 1.6–8.3)
NEUTROPHILS NFR BLD AUTO: 64.6 %
NRBC # BLD AUTO: 1.7 10*3/UL
NRBC BLD AUTO-RTO: 12 /100
PHOSPHATE SERPL-MCNC: 2.7 MG/DL (ref 2.5–4.5)
PLATELET # BLD AUTO: 1024 10E9/L (ref 150–450)
PLATELET # BLD EST: ABNORMAL 10*3/UL
POTASSIUM SERPL-SCNC: 4 MMOL/L (ref 3.4–5.3)
RBC # BLD AUTO: 3.06 10E12/L (ref 3.8–5.2)
RBC MORPH BLD: NORMAL
SODIUM SERPL-SCNC: 140 MMOL/L (ref 133–144)
SPECIMEN SOURCE: NORMAL
SPECIMEN SOURCE: NORMAL
WBC # BLD AUTO: 15.1 10E9/L (ref 4–11)

## 2020-08-04 PROCEDURE — 84100 ASSAY OF PHOSPHORUS: CPT | Performed by: NURSE PRACTITIONER

## 2020-08-04 PROCEDURE — 83735 ASSAY OF MAGNESIUM: CPT | Performed by: NURSE PRACTITIONER

## 2020-08-04 PROCEDURE — 25000131 ZZH RX MED GY IP 250 OP 636 PS 637: Performed by: CLINICAL NURSE SPECIALIST

## 2020-08-04 PROCEDURE — 25000128 H RX IP 250 OP 636: Performed by: PHYSICIAN ASSISTANT

## 2020-08-04 PROCEDURE — 27210437 ZZH NUTRITION PRODUCT SEMIELEM INTERMED LITER

## 2020-08-04 PROCEDURE — 25000132 ZZH RX MED GY IP 250 OP 250 PS 637: Performed by: PHYSICIAN ASSISTANT

## 2020-08-04 PROCEDURE — 97110 THERAPEUTIC EXERCISES: CPT | Mod: GP | Performed by: REHABILITATION PRACTITIONER

## 2020-08-04 PROCEDURE — 25000125 ZZHC RX 250: Performed by: STUDENT IN AN ORGANIZED HEALTH CARE EDUCATION/TRAINING PROGRAM

## 2020-08-04 PROCEDURE — 00000146 ZZHCL STATISTIC GLUCOSE BY METER IP

## 2020-08-04 PROCEDURE — 97530 THERAPEUTIC ACTIVITIES: CPT | Mod: GP | Performed by: REHABILITATION PRACTITIONER

## 2020-08-04 PROCEDURE — 82150 ASSAY OF AMYLASE: CPT | Performed by: NURSE PRACTITIONER

## 2020-08-04 PROCEDURE — 25000125 ZZHC RX 250: Performed by: CLINICAL NURSE SPECIALIST

## 2020-08-04 PROCEDURE — 25000132 ZZH RX MED GY IP 250 OP 250 PS 637: Performed by: NURSE PRACTITIONER

## 2020-08-04 PROCEDURE — 97116 GAIT TRAINING THERAPY: CPT | Mod: GP | Performed by: REHABILITATION PRACTITIONER

## 2020-08-04 PROCEDURE — 25000132 ZZH RX MED GY IP 250 OP 250 PS 637: Performed by: TRANSPLANT SURGERY

## 2020-08-04 PROCEDURE — 12000026 ZZH R&B TRANSPLANT

## 2020-08-04 PROCEDURE — 85025 COMPLETE CBC W/AUTO DIFF WBC: CPT | Performed by: NURSE PRACTITIONER

## 2020-08-04 PROCEDURE — 85730 THROMBOPLASTIN TIME PARTIAL: CPT | Performed by: NURSE PRACTITIONER

## 2020-08-04 PROCEDURE — 36592 COLLECT BLOOD FROM PICC: CPT | Performed by: NURSE PRACTITIONER

## 2020-08-04 PROCEDURE — 80048 BASIC METABOLIC PNL TOTAL CA: CPT | Performed by: NURSE PRACTITIONER

## 2020-08-04 PROCEDURE — 83690 ASSAY OF LIPASE: CPT | Performed by: NURSE PRACTITIONER

## 2020-08-04 RX ORDER — AMOXICILLIN AND CLAVULANATE POTASSIUM 400; 57 MG/5ML; MG/5ML
875 POWDER, FOR SUSPENSION ORAL 2 TIMES DAILY
Status: DISCONTINUED | OUTPATIENT
Start: 2020-08-04 | End: 2020-08-06 | Stop reason: HOSPADM

## 2020-08-04 RX ORDER — HYDROMORPHONE HYDROCHLORIDE 5 MG/5ML
2 SOLUTION ORAL EVERY 6 HOURS PRN
Status: DISCONTINUED | OUTPATIENT
Start: 2020-08-04 | End: 2020-08-04

## 2020-08-04 RX ORDER — DEXTROSE MONOHYDRATE 100 MG/ML
INJECTION, SOLUTION INTRAVENOUS CONTINUOUS PRN
Status: DISCONTINUED | OUTPATIENT
Start: 2020-08-04 | End: 2020-08-06 | Stop reason: HOSPADM

## 2020-08-04 RX ORDER — ASPIRIN 325 MG
325 TABLET ORAL DAILY
Status: DISCONTINUED | OUTPATIENT
Start: 2020-08-04 | End: 2020-08-06 | Stop reason: HOSPADM

## 2020-08-04 RX ORDER — METOCLOPRAMIDE HYDROCHLORIDE 5 MG/5ML
10 SOLUTION ORAL
Status: DISCONTINUED | OUTPATIENT
Start: 2020-08-04 | End: 2020-08-06 | Stop reason: HOSPADM

## 2020-08-04 RX ADMIN — ONDANSETRON 4 MG: 4 TABLET, ORALLY DISINTEGRATING ORAL at 11:04

## 2020-08-04 RX ADMIN — INSULIN ASPART 2 UNITS: 100 INJECTION, SOLUTION INTRAVENOUS; SUBCUTANEOUS at 00:14

## 2020-08-04 RX ADMIN — LORAZEPAM 1 MG: 2 SOLUTION, CONCENTRATE ORAL at 06:55

## 2020-08-04 RX ADMIN — HYDROMORPHONE HYDROCHLORIDE 5 MG: 1 SOLUTION ORAL at 06:19

## 2020-08-04 RX ADMIN — AMOXICILLIN AND CLAVULANATE POTASSIUM 875 MG: 400; 57 POWDER, FOR SUSPENSION ORAL at 19:29

## 2020-08-04 RX ADMIN — HYDROMORPHONE HYDROCHLORIDE 5 MG: 1 SOLUTION ORAL at 19:28

## 2020-08-04 RX ADMIN — ACETAMINOPHEN 650 MG: 160 LIQUID ORAL at 21:30

## 2020-08-04 RX ADMIN — INSULIN GLARGINE 30 UNITS: 100 INJECTION, SOLUTION SUBCUTANEOUS at 09:41

## 2020-08-04 RX ADMIN — HYDROMORPHONE HYDROCHLORIDE 5 MG: 1 SOLUTION ORAL at 23:16

## 2020-08-04 RX ADMIN — Medication 40 MG: at 09:05

## 2020-08-04 RX ADMIN — KETOROLAC TROMETHAMINE 15 MG: 15 INJECTION, SOLUTION INTRAMUSCULAR; INTRAVENOUS at 00:16

## 2020-08-04 RX ADMIN — KETOROLAC TROMETHAMINE 15 MG: 15 INJECTION, SOLUTION INTRAMUSCULAR; INTRAVENOUS at 06:18

## 2020-08-04 RX ADMIN — HYDROMORPHONE HYDROCHLORIDE 5 MG: 1 SOLUTION ORAL at 03:14

## 2020-08-04 RX ADMIN — ACETAMINOPHEN 650 MG: 325 SOLUTION ORAL at 00:16

## 2020-08-04 RX ADMIN — GABAPENTIN 300 MG: 250 SUSPENSION ORAL at 21:30

## 2020-08-04 RX ADMIN — ASPIRIN 325 MG ORAL TABLET 325 MG: 325 PILL ORAL at 11:14

## 2020-08-04 RX ADMIN — MULTIVITAMIN 15 ML: LIQUID ORAL at 09:07

## 2020-08-04 RX ADMIN — GABAPENTIN 300 MG: 250 SUSPENSION ORAL at 14:20

## 2020-08-04 RX ADMIN — METHOCARBAMOL 500 MG: 500 TABLET, FILM COATED ORAL at 14:19

## 2020-08-04 RX ADMIN — GABAPENTIN 300 MG: 250 SUSPENSION ORAL at 06:18

## 2020-08-04 RX ADMIN — PIPERACILLIN AND TAZOBACTAM 3.38 G: 3; .375 INJECTION, POWDER, FOR SOLUTION INTRAVENOUS at 10:26

## 2020-08-04 RX ADMIN — METHOCARBAMOL 500 MG: 500 TABLET, FILM COATED ORAL at 00:16

## 2020-08-04 RX ADMIN — METHOCARBAMOL 500 MG: 500 TABLET, FILM COATED ORAL at 19:28

## 2020-08-04 RX ADMIN — AMOXICILLIN AND CLAVULANATE POTASSIUM 875 MG: 400; 57 POWDER, FOR SUSPENSION ORAL at 12:07

## 2020-08-04 RX ADMIN — HYDROMORPHONE HYDROCHLORIDE 5 MG: 1 SOLUTION ORAL at 11:12

## 2020-08-04 RX ADMIN — METOCLOPRAMIDE HYDROCHLORIDE 10 MG: 5 SOLUTION ORAL at 15:43

## 2020-08-04 RX ADMIN — SENNOSIDES 5 ML: 8.8 LIQUID ORAL at 19:27

## 2020-08-04 RX ADMIN — ACETAMINOPHEN 650 MG: 160 LIQUID ORAL at 15:43

## 2020-08-04 RX ADMIN — METHOCARBAMOL 500 MG: 500 TABLET, FILM COATED ORAL at 06:18

## 2020-08-04 RX ADMIN — METOCLOPRAMIDE HYDROCHLORIDE 10 MG: 5 SOLUTION ORAL at 12:07

## 2020-08-04 RX ADMIN — INSULIN ASPART 1 UNITS: 100 INJECTION, SOLUTION INTRAVENOUS; SUBCUTANEOUS at 01:05

## 2020-08-04 RX ADMIN — HYDROXYZINE HYDROCHLORIDE 25 MG: 10 SYRUP ORAL at 03:33

## 2020-08-04 RX ADMIN — PIPERACILLIN AND TAZOBACTAM 3.38 G: 3; .375 INJECTION, POWDER, FOR SOLUTION INTRAVENOUS at 03:29

## 2020-08-04 RX ADMIN — HUMAN INSULIN 2 UNITS/HR: 100 INJECTION, SOLUTION SUBCUTANEOUS at 03:19

## 2020-08-04 RX ADMIN — METOCLOPRAMIDE HYDROCHLORIDE 10 MG: 5 SOLUTION ORAL at 21:30

## 2020-08-04 RX ADMIN — SENNOSIDES 5 ML: 8.8 LIQUID ORAL at 09:06

## 2020-08-04 RX ADMIN — HYDROMORPHONE HYDROCHLORIDE 5 MG: 1 SOLUTION ORAL at 15:50

## 2020-08-04 ASSESSMENT — ACTIVITIES OF DAILY LIVING (ADL)
ADLS_ACUITY_SCORE: 14

## 2020-08-04 ASSESSMENT — MIFFLIN-ST. JEOR: SCORE: 1255.43

## 2020-08-04 NOTE — PROGRESS NOTES
"Transplant Admission Psychosocial Assessment    Patient Name: Dottie Hernandez  : 1965  Age: 55 year old  MRN: [id number]  Date of Initial Social Work Evaluation: N/A SW was not consulted for an assessment prior to TPIAT    Patient underwent TPIAT transplant on 20.  Spoke with patient and her brother Will on the telephone to update psychosocial assessment and provide education about SW role while inpatient, and to begin discussion of expectations/requirements, caregiver needs and follow up needs post-transplant.     Presenting Information   Living Situation: Pt lives in [place], [place] with her  Jian and 2 adult children.   If not local, plans for short term stay:  Pt is planning on staying at the [facility] in [place].   Previous Functional Status: Independent with ADL's  Cultural/Language/Spiritual Considerations: None identified by patient     Support System  Primary Support Person  Jian  Other support:  Adult children, brother Will, friend Justina   Plan for support in immediate post-transplant period: Will, friend Justina, and her son Carol plan on taking turns assisting patient post-hospitalization     Health Care Directive  Decision Maker: Self  Alternate Decision Maker:  is NOK   Health Care Directive: Provided education    Mental Health/Coping:   History of Mental Health: History of depression and anxiety   History of Chemical Health: Pt's brother reported pt will have a glass of wine \"every now and then\", denied any history of substance use.   Current status: Pt's brother reported pt has been having increased anxiety post-surgery. He reported pt was crying often yesterday due to being very anxious. He reported pt was given ativan for her anxiety however he felt that the process to get her medication for anxiety took too long. Pt's brother reported today pt has been feeling better.  Coping: Crying when anxious, per brother today seems better  Services " "Needed/Recommended: Continue to monitor anxiety    Financial   Income: Pt is on long term disability from her job as  for [employer]. Her  is employed full time.   Impact of transplant on income: Pt is on LTD.  Insurance and medication coverage: BCBS through employer  Financial concerns: None identified at this time   Resources needed: None identified at this time     Education provided by SW: Social Work role inpatient and outpatient setting, parking information and post-transplant expectations.    Assessment and recommendations and plan:  Initially started assessment via telephone with patient. Pt then handed the telephone to her brother to complete with this writer as she stated she is on pain medications and would like her brother to answer questions. Brother would relay information to pt and this writer could hear pt state \"okay\" to confirm she heard what was being said. Pt's brother inquired if pt could get home care set up, informed brother this writer would reach out to pt's coordinator.    Fanny Potts [facility]    Kidney/Pancreas/Auto Islet Transplant Programs        "

## 2020-08-04 NOTE — PROVIDER NOTIFICATION
Did notify Rosi MEDEIROS about Shaina's platelet count and she was going to pass it on to the team.

## 2020-08-04 NOTE — PROGRESS NOTES
CLINICAL NUTRITION SERVICES - REASSESSMENT NOTE     Nutrition Prescription    RECOMMENDATIONS FOR MDs/PROVIDERS TO ORDER:  Consider adding free water ~35 ml per hour (if feed/flush tubing available to do so) to meet hydration needs.     Malnutrition Status:    Non-severe malnutrition in the context of acute illness    Recommendations already ordered by Registered Dietitian (RD):  Continue TF as ordered.     Future/Additional Recommendations:  Upon diet advancement greater than CL, recommend starting Creon 24 - 3 with meals, 2 with snacks.      EVALUATION OF THE PROGRESS TOWARD GOALS   Diet: NPO    Nutrition Support: Impact Peptide 1.5 @ 50 ml/hr -> 1800 kcals (29 kcal/kg/day), 113 g PRO (1.8 gm/kg/day), 924 ml free H2O, 77 g Fat (50% from MCTs), 168 g CHO and no Fiber daily.  Relizorb being changed q 12 hours.      Intake: TF started on 7/28, goal rate met on 8/1.  Tolerating TF well.  Has received a 7 day average of 687 ml TF daily, providing 1030 kcal and 65 grams protein (65% kcal needs, 80% pro needs).  Patient denies any issues with TF tolerance.      NEW FINDINGS   Weight: Weight up 2 kg from admission weight.     Labs: BG elevated yesterday up 150-250's    Meds: Reglan QID, Lantus 30 units AM, 15 units HS, insulin gtt, [place] BID, Colace BID, MVI with minerals    GI: Patient reports only tolerating G-tube clamped for 2 hours max, prior to needing it unclamped.  Output <300 ml daily.   reporting cramping after medication administration.  Patient reports being on pancreatic enzymes (Creon) prior to surgery but that it didn't seem to make a difference. Had 1 BM yesterday and none yet today.    MALNUTRITION  % Intake: < 75% for > 7 days (non-severe)  % Weight Loss: None noted  Subcutaneous Fat Loss: None observed  Muscle Loss: Thoracic region (clavicle, acromium bone, deltoid, trapezius, pectoral):  mild  Fluid Accumulation/Edema: None noted  Malnutrition Diagnosis: Non-severe malnutrition in the context  "of acute illness    Previous Goals   1.  Tolerate adv of TF to goal infusion without sx of refeeding within the next 5 days. - MET  2.  Once TF at goal, total ave EN intakes provide minimum of 25 kcal/kg/day and 1.3 g/kg/day (per 63 kg) - MET    Previous Nutrition Diagnosis  Inadequate oral intake  Evaluation: No change    CURRENT NUTRITION DIAGNOSIS  Inadequate enteral nutrition infusion related to slow advancement to goal as evidenced by patient receiving 65% kcal needs and 80% pro needs on average over the last 7 days.      INTERVENTIONS  Implementation  Enteral Nutrition - Discussed TF/Relizorb with patient/family.     Nutrition education for recommended modifications - Discussed need for pancreatic enzymes, signs/symptoms of malabsorption. Briefly discussed diet advancement and provided handout \"diet guidelines for total pancreatectomy\".      Goals  Total avg nutritional intake to meet a minimum of 25 kcal/kg and 1.3 g PRO/kg daily (per dosing wt 63 kg).    Monitoring/Evaluation  Progress toward goals will be monitored and evaluated per protocol.    Dione Pedersen MS, RD, LD  Pager [phone]      "

## 2020-08-04 NOTE — PROGRESS NOTES
"SPIRITUAL HEALTH SERVICES: Tele-Encounter  Patient Location: [facility]A  Spoke with: patient    Referral Source: follow-up from yesterday.    If applicable: patient was appropriately screened for telechaplaincy support with bedside nurse prior to visit (e.g. Mental Health and Addiction contexts). See call details below.    VISIT DATA (Interventions/Outcomes):      Pt asked for me to share a blessing with her for her \"medical team\" as she continues to get \"a little bit better everyday.\" I prayed a blessing with the patient.      PLAN:  Pt had no other [facility] requests at this time and knows how to contact us through nursing with further requests.    Ramón Mcleod   Intern  Voicemail: [phone]  ______________________________    Type of service:  Telephone Visit/Video Visit     has received verbal consent for a Televisit from the patient? Yes    Distance Provider Location: designated [place] office or home office (secure setting)    Mode of Communication: telephone (via [employer] phone or [employer] tele-call-number ([phone]))    *[facility] remains available 24/7 for emergent requests/referrals, either by having the switchboard page the on-call  or by entering an ASAP/STAT consult in Epic (this will also page the on-call ). Routine Epic consults receive an initial response within 24 hours.*   "

## 2020-08-04 NOTE — PLAN OF CARE
Discharge Planner PT   Patient plan for discharge: Home with family assist  Current status: Pt amb ~ 300 feet with SBA. Pt performs basic transfers with SBA. Pt participated in standing LE Therex x 10 reps.  Barriers to return to prior living situation: abdominal precautions, pain limiting mobility  Recommendations for discharge: Home with family assist  Rationale for recommendations: Pt is mobilizing well, anticipate pt will be safe to discharge home when medically ready for discharge. Pt will need assist for ADLs greater than 10 pounds of lifting.       Entered by: Dione Smith 08/04/2020 12:33 PM

## 2020-08-04 NOTE — PROGRESS NOTES
[facility]     Received referral for possible IV abx.  Benefits verified.Patient is covered under BCBS with a ded $250 (met $150), 80% coverage with a $2500 OOP max (met $729).  Once patient has met ded and oop, coverage is at 100%.       Relayed benefits to patient and offered choice of nursing.  Patient wants hospitals for pharmacy and nursing.      Answered patient's questions. I Liaison will work with patient to finalize final discharge plan and help coordinate transition from hospital to home (pt staying locally at [facility] until released to go home to [place]).        Thank you for the referral.     Teena Elizabeth. RN BSN PHN LEANDER  [facility]  [phone] Office  [phone] Fax  [phone] (Nurse Triage M-F 8-5)

## 2020-08-04 NOTE — PROVIDER NOTIFICATION
At 0230 text , endocrine doc. On call, due to finger sticks were still elevated at 200 and 186. She called back and stated to start her on insulin gtt.

## 2020-08-04 NOTE — PLAN OF CARE
VS: stable  Neuro: drowsy but oriented x4  Mobility: up SBA  Discomfort: complains of abdominal pain (receiving scheduled dilaudid)         and cramping (relieved by passing gas and stooling)   LDAs: CVC intact with TKO   Labs: platelets 1024 (team added aspirin)   Glu: on insulin drip alg 1-2 (glu range )   : voiding clear yellow urine  GI: BM's x2 and passing gas  Plan:   Med card created and reviewed with patient and brother

## 2020-08-04 NOTE — PLAN OF CARE
"BP (!) 144/89 (BP Location: Left arm)   Pulse 91   Temp 99.5  F (37.5  C) (Oral)   Resp 16   Ht 1.651 m (5' 5\")   Wt 64.9 kg (143 lb 1.6 oz)   SpO2 93%   BMI 23.81 kg/m       1391-1827  AVSS on RA. Continuous 02 monitoring due to pain medications. Denies nausea. BS Q4 hours; patient transitioned to novolog and lantus this afternoon and insulin drip was stopped earlier today. BS have been consistently between 170's-200's throughout evening. On call MD and endocrinologist notified and was asked by bedside nurse if insulin drip should be restarted due to BS not being controlled. On call MD declined need to restart pump and will follow up with day team for changes. On call endocrinologist was then paged and would like to be notified by bedside nurse at 0000 for update on BS and will either increase sliding scale or start back up on insulin drip. Handoff to next nurse was given and nurse is aware of situation. Pain managed with scheduled medications and PRN breakthrough of IV dilaudid and atarax. Denied nausea. Patient only tolerated g-tube clamped from 8789-8060. Patient insisted on having it to gravity rest of evening/night. Right WILMA; serosanguinous output- dressing changed. NPO with ice chips; very few ice chips were eaten by patient this evening. Continuous TF into j tube @ 50 ml/hr; relizorb was changed with TF set @ 2000; charted. Triple lumen; TKO with intermittent antibiotics. Up with SB, calls appropriately. BM x1 tonight. Adequate urine output- misses hat intermittently. Incision stapled; clean dry and intact. Lidocaine patches placed on abdomen and back this evening. Patient responding well to intervention. Continue plan of care, please notify MD with any changes.     "

## 2020-08-04 NOTE — PLAN OF CARE
At start of shift Dottie continued to run bridget finger sticks. Spoke with endocrine x2 and ultimately ended up  starting an insulin gtt. Gtt started out on algorithm 2 going up to algorithm 4 and currently is off. His gtt has been between 0-4u/hr. With using her PRN atarax and ativan have not had to give her any prn IV dilaudid. Abdominal incision CDI and WILMA in patent draining serousanguenous. Up to the bathroom with SBA, voided an adequate amount or clear yellow urine and no stool tonight. Will continue to monitor and report any significant changes.

## 2020-08-04 NOTE — PROGRESS NOTES
"Pancreatitis Service - Daily Progress Note  08/04/2020    Assessment & Plan: Dottie Hernandez is a 54 year old female with a past medical history significant for chronic pancreatitis that began 20 years ago attributed to pancreatic divisum, seasonal allergies, migraines, and ANIBAL. She is now s/p TPAIT and splenectomy with stent placement with Dr. Jarvis on 7/27/20.     Cardiorespiratory:   Hypercapnic respiratory failure: Reintubated on POD#2, likely 2/2 meds and likely underlying sleep apnea. . Extubated again 7/30/20. Plan to utilize CPAP at night. CXR 7/29 shows \"Mild to moderate bilateral pleural effusions, left greater than right. 2. Hazy opacifications of the left lung may represent atelectasis versus infection. Recommend follow-up to clearing. Stopped IVF. Continue pulmonary hygiene.  Sleep Apnea: Pt states she was just diagnosed with sleep apnea prior to surgery, was not yet using CPAP. Plan to utilize CPAP at night.  Heme:  Acute blood loss anemia: EBL intra-op 500mL; received 1unit pRBC. Transfusion 7/29 overnight due to hgb of 6. Hgb remains stable 8.5 today  GI/Nutrition: NG removed. G-tube intermittently clamped yesterday, put out 400 cc out when changed to gravity. Felt nauseous intermittently. Continue clamp trials with gravity drainage overnight. Will change reglan from PRN to scheduled per J tube. On PRN Zofran. Tube feeds @ goal, per J-tube at 50mL/hr since 8/1. Continue multivitamin.  Fluid/Electrolytes:  CIV ; electrolyte replacements per protocol.   : Armendariz was removed on 7/31, voiding  Post-pancreatectomy diabetes: Transitioned to subcutaneous insulin yesterday, though did have some higher glucose levels overnight so insulin drip restarted, will plan to transition off again today per endocrine.  Infection: Afebrile;   Leukocytosis: WBC 15.1 (15.6), s/p splenectomy. Will plan to remove central line tomorrow. Continue to monitor  Positive islet culture: growing kleb pneumo, will switch zosyn " to augmentin, plan for 14 day total course.  SIRS: Tm 102.5 7/30, now low grade temps. Hypotensive at that time, given fluid boluses and started on empiric vanco in addition to zosyn. Lactate elevated at 2.2. Blood cultures with no growth to date. Vanco an Fluconzole stopped on 7/31. Remains on IV Zosyn, LA 0.9 (8/2) transition to Augmentin 8/4 to complete 14 days of therapy  Prophylaxis: PPI (Protonix), Anticoagulation: heparin drip to 400 units/hr, now complete.  Pain control: adequate, current regimen:  -Precedex drip off  -On-Q continuous paravertebral blocks, removed 8/3.  -Dilaudid PCA stopped 8/2, dilaudid JT 2-4 Q4, increase to 4-6 Q4 with discontinuation of block. Will discontinue IV Dilaudid  -robaxin 500 mg IV every 6 hours-change to po  -Toradol 15 mg every 6 hours x5 days 7/30-8/3  -Will schedule tylenol down the J tube  -gabapentin 300mg TID  -ketamine , stopped due to sedation  -ativan 1mg Q12 PRN  -hydroxyzine 25mg Q6H PRN  Activity: as tolerated; PT/OT ordered  Anticipated LOS/Discharge:  7A, plan for discharge Thursday 8/6    Medical Decision Making:   Medium  Subsequent visit 61847 (moderate level decision making)    ANGELO/Fellow/Resident Provider: Anayeli Lyles PA-C [id number]    Faculty: Steve Jarvis MD  __________________________________________________________________  Transplant History: Admitted 7/27/2020 for TPAIT  7/27/2020 (Islet), Postoperative day: 8     Interval History: History is obtained from the patient  Awake, alert. Ambulating. Intermittent nausea yesterday. Having bowel movements. Pain control is adequate.     ROS:   A 10-point review of systems was negative except as noted above.    Curent Meds:    acetaminophen  650 mg Per J Tube Q6H     amoxicillin-clavulanate  875 mg Per J Tube BID     aspirin  325 mg Oral Daily     gabapentin  300 mg Oral or J tube Q8H JENNIFER     HYDROmorphone (STANDARD CONC)  4-6 mg Oral Q4H     insulin glargine  15 Units Subcutaneous QPM     insulin glargine   "30 Units Subcutaneous QAM     lactated ringers  250 mL Intravenous Once     lidocaine  1 patch Transdermal Q24h    And     lidocaine   Transdermal Q8H     - MEDICATION INSTRUCTIONS -   Does not apply Once     methocarbamol  500 mg Oral Q6H     metoclopramide  10 mg Per J Tube 4x Daily AC & HS     multivitamins w/minerals  15 mL Oral or Feeding Tube Daily     pantoprazole  40 mg Oral or J tube Daily     sennosides  5 mL Oral BID     sodium chloride (PF)  3 mL Intracatheter Q8H       Physical Exam:     Admit Weight: 63.1 kg (139 lb 1.8 oz)    Current Vitals:   /83 (BP Location: Left arm)   Pulse 91   Temp 98.7  F (37.1  C) (Oral)   Resp 16   Ht 1.651 m (5' 5\")   Wt 65 kg (143 lb 3.2 oz)   SpO2 96%   BMI 23.83 kg/m      Vital sign ranges:    Temp:  [98.4  F (36.9  C)-99.5  F (37.5  C)] 98.7  F (37.1  C)  Pulse:  [91] 91  Heart Rate:  [] 95  Resp:  [16] 16  BP: (129-144)/(79-93) 129/83  SpO2:  [85 %-97 %] 96 %  Patient Vitals for the past 24 hrs:   BP Temp Temp src Pulse Heart Rate Resp SpO2 Weight   08/04/20 1048 -- -- -- -- -- -- -- 65 kg (143 lb 3.2 oz)   08/04/20 0758 129/83 98.7  F (37.1  C) Oral -- 95 16 96 % --   08/04/20 0341 (!) 139/93 98.7  F (37.1  C) Oral -- 88 16 97 % --   08/04/20 0008 -- -- -- -- -- -- 96 % --   08/04/20 0000 (!) 141/79 98.4  F (36.9  C) Oral -- 95 16 (!) 85 % --   08/03/20 1938 (!) 144/89 99.5  F (37.5  C) Oral 91 95 16 93 % --   08/03/20 1605 (!) 135/92 99  F (37.2  C) Oral -- 103 16 94 % --   08/03/20 1204 138/79 99.2  F (37.3  C) Oral -- 94 16 97 % --     General Appearance: no acute distress, resting comfortably in bed  Skin: normal, warm, dry, no suspicious lesions or rashes  Heart: regular rate and rhythm  Lungs: nonlabored resps on RA  Abdomen: The abdomen is rounded, soft and mildly tender to palpation. The wound is stapled, c/d/i. G tube to gravity drainage with tan output with sediment. WILMA x1 with serosanguinous output- now removed  : Voiding  Extremities: " edema:none.   Neuro: Awake, alert, oriented x3    Data:   CMP  Recent Labs   Lab 08/04/20  0653 08/03/20  0557  07/30/20  0456    138   < > 139   POTASSIUM 4.0 3.8   < > 3.5   CHLORIDE 104 105   < > 105   CO2 31 30   < > 28   GLC 92 117*   < > 168*   BUN 18 16   < > 8   CR 0.60 0.56   < > 0.62   GFRESTIMATED >90 >90   < > >90   GFRESTBLACK >90 >90   < > >90   MONICA 8.3* 8.2*   < > 7.2*   MAG 2.1 2.2   < > 1.9   PHOS 2.7 2.8   < > 1.4*   AMYLASE 12*  --   --   --    LIPASE 39*  --   --   --    ALBUMIN  --   --   --  1.9*   BILITOTAL  --   --   --  0.4   ALKPHOS  --   --   --  72   AST  --   --   --  22   ALT  --   --   --  28    < > = values in this interval not displayed.     CBC  Recent Labs   Lab 08/04/20  0653 08/03/20  0557   HGB 8.6* 8.5*   WBC 15.1* 15.6*   PLT 1,024* 843*     Coags  Recent Labs   Lab 08/04/20  0653 08/03/20  0557   PTT 30 32      Urinalysis  Recent Labs   Lab Test 07/29/20  0924 07/23/20  0805   COLOR Light Yellow Yellow   APPEARANCE Clear Slightly Cloudy   URINEGLC Negative Negative   URINEBILI Negative Negative   URINEKETONE Negative 5*   SG 1.018 1.020   UBLD Negative Negative   URINEPH 5.5 6.0   PROTEIN Negative Negative   NITRITE Negative Negative   LEUKEST Negative Large*   RBCU 3* 6*   WBCU 3 94*

## 2020-08-04 NOTE — PROGRESS NOTES
Care Coordinator    Care Coordinator - Discharge Planning    Admission Date/Time:  7/27/2020  Attending MD:  Steve Jarvis MD     Data  Date of initial CC assessment:  8/3  Chart reviewed, discussed with interdisciplinary team.   Patient was admitted for:   1. Idiopathic chronic pancreatitis (H)    2. Pancreatitis    3. Pancreatitis    4. Chronic biliary pancreatitis (H)    5. Acquired total absence of pancreas    6. Post-pancreatectomy diabetes (H)    7. On enteral nutrition    8. Recurrent pancreatitis    Dottie Hernandez is a 54 year old female with a past medical history significant for chronic pancreatitis that began 20 years ago attributed to pancreatic divisum, seasonal allergies, migraines, and ANIBAL. She is now s/p TPAIT and splenectomy with stent placement with Dr. Jarvis on 7/27/20--per EZIO Tang, ntoe--per Anayeli planning discharge Thursday, 8/6 to local hot.     Assessment   Concerns with insurance coverage for discharge needs: None.  Current Living Situation: Patient lives with spouse.  Support System: Supportive and Involved  Services Involved: none  Transportation at Discharge: Car and Family or friend will provide  Transportation to Medical Appointments:    - Name of caregiver: Brother Will until Saturday8/8 then friend Rosalva will arrive x 1 week, then another x 1 week I have informed Dr Jarvis of this and also Sumaya Rivas @ [facility]--as will require home RN teaching for every caregiver(extra).  Barriers to Discharge: toleration of enteral feeds/pain control/understanding of DM and new insulin      Coordination of Care and Referrals: Provided patient/family with options for Home Infusion.   I called pt's room phone and brother Will answered and was very pleasant--pt wanted him to talk. I explained my role.  I gave choice of enteral vendor--they chose [facility]--referral made and terrance Rivas updated. Relizorb cartridges to be delivered by [facility].  PLC @ 8/5 @ 1:30pm for enteral  teach--Will aware.  DE: 8/6 @ ____Kary Haines.  Discharge pharmacy liason: Fernanda Santacruz--informed she will need liquid meds to go home on. IVAB to change to liquid augmentin.  [facility]: Gena Marshall.  Surgeon: Steve Jarvis        Plan  Anticipated Discharge Date:  8/6  Anticipated Discharge Plan:  *I need to continue conversation with Will and get his cell #. He is aware of [facility]C clinic the day after discharge and then [facility] RN's will visit and will have many clinic follow up virtual or in person visits.    CTS Handoff completed:  YES    Nan Lambert RN

## 2020-08-04 NOTE — PROGRESS NOTES
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 Jarvis 7/27/2020 (872 IE/kg), post-op course complicated by Somnolence, for which she was Re-intubated 7/29, then extubated 7/30.        Total pancreatectomy with auto-islet cell transplant : A1C 6% on (7/23/2020) indicating pre-diabetes most likely 2/2 chronic pancreatitis     BG persistently above target yesterday, so IV insulin restarted early AM    Plan  -  glargine 30 units qAM and 15 units qPM , stopping IV insulin after evening dose (yesterday 29+ 9 units)    - increase aspart custom scale from 1 per 40 to 1 per 25 for BG>120, q4h  -goal glucose   -hypoglycemia protocol  -D10W PRN for hypoglyc prevention in case of TF interruptions               -diabetes education will be bedside in next couple days       Outpatient diabetes follow up: TBD--none currently scheduled.  Pt anticipates follow up with Dr. López while staying locally, then to establish with endocrinologist near her home in [place] (her GI team will refer her).  Does not have appts with diab educ RN or RD.      Discussed w/ pt, brother, RN, primary team           Interval History:   The last 24 hours progress and nursing notes reviewed.    Impact Peptide 1.5 at 50 ml.h since 1400 on 8/1. +BM.  Dextrose fluids decreased to 10 ml/hour 7/31.  Started on glargine 29 units yesterday AM.  Added 9 units at 2000, givern BG persisting in 200s.  Still high at midnight, so insulin drip started and infused for 3 hours. AM, gtt off with BG in 90s.  Needing mostly 0.2 units/h, with a few hours at 1 units/h once steady control today (between 3746-9839)        activity, aiming for 2-3 walks/day.  Falling asleep easily between cares today.  Anxious yesterday but better today  Says g-tube clamping is difficult.   "Working on what to do to manage discomfort after meds and with clamping    Is receiving medications in solution ( gabapentin, multivitamins, and prn Phos replacement)   Hydroxyzine soln is notably large volume- 20 ml.  Complains particularly about tolerating tylenol and about BG after Tylenol.  Changed to sugar-free today.    Relizorb was changed about 8 hours late yesterday?  Impact on TF absorption?      Shaina is planning to have a series of caregivers, so working on notes to keep everyone informed.  Goal discharge Thursday    Nutrition:   Impact peptide 1.5 via jejunostomy  goal 50 ml/hour      Recent Labs   Lab 08/04/20  0702 08/04/20  0653 08/04/20  0631 08/04/20  0516 08/04/20  0426 08/04/20  0321 08/04/20  0205  08/03/20  0557  08/02/20  0743  08/01/20  0557  07/31/20  0416  07/30/20  0456   GLC  --  92  --   --   --   --   --   --  117*  --  142*  --  105*  --  175*  --  168*   BGM 97  --  73 87 156* 187* 206*   < >  --    < >  --    < >  --    < > 175*   < >  --     < > = values in this interval not displayed.               Review of Systems:   See interval hx          Medications impacting glucose:   gabapentin, multivitamins, and prn Phos replacement)      None                Physical Exam:     BP (!) 139/93 (BP Location: Left arm)   Pulse 91   Temp 98.7  F (37.1  C) (Oral)   Resp 16   Ht 1.651 m (5' 5\")   Wt 64.9 kg (143 lb 1.6 oz)   SpO2 97%   BMI 23.81 kg/m       General:  pleasant woman resting in bed, in no acute distress.  Brother Will at bedside  HEENT: NC/AT, PER and anicteric, non-injected, oral mucous membranes moist.   Lungs: unlabored respiration, no cough  ABD: rounded, g-tuibe clamped  Skin: warm and dry, no obvious lesions  MSK:  fluid movement of all extremities  Mental status:  Awakens easily to voice, then Dozing off, after awake a minute is oriented x3, communicating clearly.  Some memory deficits  Psych:  calm, easily engaged    BP (!) 139/93 (BP Location: Left arm)   Pulse 91  " " Temp 98.7  F (37.1  C) (Oral)   Resp 16   Ht 1.651 m (5' 5\")   Wt 64.9 kg (143 lb 1.6 oz)   SpO2 97%   BMI 23.81 kg/m             Data:     Lab Results   Component Value Date    A1C 6.0 07/23/2020            Recent Labs   Lab Test 08/04/20  0653 08/03/20  0557    138   POTASSIUM 4.0 3.8   CHLORIDE 104 105   CO2 31 30   ANIONGAP 5 3   GLC 92 117*   BUN 18 16   CR 0.60 0.56   MONICA 8.3* 8.2*     CBC RESULTS:   Recent Labs   Lab Test 08/04/20  0653   WBC 15.1*   RBC 3.06*   HGB 8.6*   HCT 28.2*   MCV 92   MCH 28.1   MCHC 30.5*   RDW 15.2*   PLT 1,024*   I spent a total of 35 minutes bedside and on the inpatient unit managing the glycemic care of Dottie Hernandez. Over 50% of my time on the unit was spent counseling the patient and brother and/or coordinating care regarding acute BG mgmnt.  See note for details.    Dione Madrid APRN -2002  Diabetes Management job code [id number]            "

## 2020-08-05 ENCOUNTER — APPOINTMENT (OUTPATIENT)
Dept: EDUCATION SERVICES | Facility: CLINIC | Age: 55
End: 2020-08-05
Payer: COMMERCIAL

## 2020-08-05 ENCOUNTER — APPOINTMENT (OUTPATIENT)
Dept: PHYSICAL THERAPY | Facility: CLINIC | Age: 55
End: 2020-08-05
Attending: TRANSPLANT SURGERY
Payer: COMMERCIAL

## 2020-08-05 LAB
ANION GAP SERPL CALCULATED.3IONS-SCNC: 5 MMOL/L (ref 3–14)
ANISOCYTOSIS BLD QL SMEAR: SLIGHT
APTT PPP: 26 SEC (ref 22–37)
BASOPHILS # BLD AUTO: 0 10E9/L (ref 0–0.2)
BASOPHILS NFR BLD AUTO: 0 %
BUN SERPL-MCNC: 18 MG/DL (ref 7–30)
CALCIUM SERPL-MCNC: 8.2 MG/DL (ref 8.5–10.1)
CHLORIDE SERPL-SCNC: 102 MMOL/L (ref 94–109)
CO2 SERPL-SCNC: 31 MMOL/L (ref 20–32)
CREAT SERPL-MCNC: 0.47 MG/DL (ref 0.52–1.04)
DIFFERENTIAL METHOD BLD: ABNORMAL
EOSINOPHIL # BLD AUTO: 0.9 10E9/L (ref 0–0.7)
EOSINOPHIL NFR BLD AUTO: 6.1 %
ERYTHROCYTE [DISTWIDTH] IN BLOOD BY AUTOMATED COUNT: 15.2 % (ref 10–15)
GFR SERPL CREATININE-BSD FRML MDRD: >90 ML/MIN/{1.73_M2}
GLUCOSE BLDC GLUCOMTR-MCNC: 109 MG/DL (ref 70–99)
GLUCOSE BLDC GLUCOMTR-MCNC: 115 MG/DL (ref 70–99)
GLUCOSE BLDC GLUCOMTR-MCNC: 126 MG/DL (ref 70–99)
GLUCOSE BLDC GLUCOMTR-MCNC: 163 MG/DL (ref 70–99)
GLUCOSE BLDC GLUCOMTR-MCNC: 196 MG/DL (ref 70–99)
GLUCOSE BLDC GLUCOMTR-MCNC: 196 MG/DL (ref 70–99)
GLUCOSE BLDC GLUCOMTR-MCNC: 278 MG/DL (ref 70–99)
GLUCOSE BLDC GLUCOMTR-MCNC: 85 MG/DL (ref 70–99)
GLUCOSE SERPL-MCNC: 160 MG/DL (ref 70–99)
HCT VFR BLD AUTO: 27 % (ref 35–47)
HGB BLD-MCNC: 8.3 G/DL (ref 11.7–15.7)
LACTATE BLD-SCNC: 0.6 MMOL/L (ref 0.7–2)
LYMPHOCYTES # BLD AUTO: 0.5 10E9/L (ref 0.8–5.3)
LYMPHOCYTES NFR BLD AUTO: 3.5 %
MAGNESIUM SERPL-MCNC: 2.2 MG/DL (ref 1.6–2.3)
MCH RBC QN AUTO: 28.3 PG (ref 26.5–33)
MCHC RBC AUTO-ENTMCNC: 30.7 G/DL (ref 31.5–36.5)
MCV RBC AUTO: 92 FL (ref 78–100)
METAMYELOCYTES # BLD: 0.3 10E9/L
METAMYELOCYTES NFR BLD MANUAL: 1.7 %
MONOCYTES # BLD AUTO: 0.4 10E9/L (ref 0–1.3)
MONOCYTES NFR BLD AUTO: 2.6 %
MYELOCYTES # BLD: 0.3 10E9/L
MYELOCYTES NFR BLD MANUAL: 1.7 %
NEUTROPHILS # BLD AUTO: 13 10E9/L (ref 1.6–8.3)
NEUTROPHILS NFR BLD AUTO: 84.4 %
NRBC # BLD AUTO: 0.5 10*3/UL
NRBC BLD AUTO-RTO: 4 /100
PHOSPHATE SERPL-MCNC: 3.6 MG/DL (ref 2.5–4.5)
PLATELET # BLD AUTO: 980 10E9/L (ref 150–450)
PLATELET # BLD EST: ABNORMAL 10*3/UL
POLYCHROMASIA BLD QL SMEAR: ABNORMAL
POTASSIUM SERPL-SCNC: 4.5 MMOL/L (ref 3.4–5.3)
RBC # BLD AUTO: 2.93 10E12/L (ref 3.8–5.2)
SODIUM SERPL-SCNC: 138 MMOL/L (ref 133–144)
WBC # BLD AUTO: 15.4 10E9/L (ref 4–11)

## 2020-08-05 PROCEDURE — 85730 THROMBOPLASTIN TIME PARTIAL: CPT | Performed by: NURSE PRACTITIONER

## 2020-08-05 PROCEDURE — 97116 GAIT TRAINING THERAPY: CPT | Mod: GP

## 2020-08-05 PROCEDURE — 40000275 ZZH STATISTIC RCP TIME EA 10 MIN

## 2020-08-05 PROCEDURE — 83735 ASSAY OF MAGNESIUM: CPT | Performed by: NURSE PRACTITIONER

## 2020-08-05 PROCEDURE — 40000556 ZZH STATISTIC PERIPHERAL IV START W US GUIDANCE

## 2020-08-05 PROCEDURE — 80048 BASIC METABOLIC PNL TOTAL CA: CPT | Performed by: NURSE PRACTITIONER

## 2020-08-05 PROCEDURE — 84100 ASSAY OF PHOSPHORUS: CPT | Performed by: NURSE PRACTITIONER

## 2020-08-05 PROCEDURE — 36592 COLLECT BLOOD FROM PICC: CPT | Performed by: TRANSPLANT SURGERY

## 2020-08-05 PROCEDURE — 25000132 ZZH RX MED GY IP 250 OP 250 PS 637: Performed by: TRANSPLANT SURGERY

## 2020-08-05 PROCEDURE — 97530 THERAPEUTIC ACTIVITIES: CPT | Mod: GP

## 2020-08-05 PROCEDURE — 12000026 ZZH R&B TRANSPLANT

## 2020-08-05 PROCEDURE — 00000146 ZZHCL STATISTIC GLUCOSE BY METER IP

## 2020-08-05 PROCEDURE — 85025 COMPLETE CBC W/AUTO DIFF WBC: CPT | Performed by: NURSE PRACTITIONER

## 2020-08-05 PROCEDURE — 25000132 ZZH RX MED GY IP 250 OP 250 PS 637: Performed by: PHYSICIAN ASSISTANT

## 2020-08-05 PROCEDURE — 36592 COLLECT BLOOD FROM PICC: CPT | Performed by: NURSE PRACTITIONER

## 2020-08-05 PROCEDURE — 83605 ASSAY OF LACTIC ACID: CPT | Performed by: TRANSPLANT SURGERY

## 2020-08-05 PROCEDURE — 25000132 ZZH RX MED GY IP 250 OP 250 PS 637: Performed by: NURSE PRACTITIONER

## 2020-08-05 RX ORDER — METHOCARBAMOL 500 MG/1
500 TABLET, FILM COATED ORAL 3 TIMES DAILY PRN
Status: DISCONTINUED | OUTPATIENT
Start: 2020-08-05 | End: 2020-08-06 | Stop reason: HOSPADM

## 2020-08-05 RX ORDER — METHOCARBAMOL 500 MG/1
500 TABLET, FILM COATED ORAL DAILY
Status: DISCONTINUED | OUTPATIENT
Start: 2020-08-05 | End: 2020-08-06 | Stop reason: HOSPADM

## 2020-08-05 RX ORDER — LORAZEPAM 2 MG/ML
0.5 CONCENTRATE ORAL EVERY 12 HOURS PRN
Status: DISCONTINUED | OUTPATIENT
Start: 2020-08-05 | End: 2020-08-06 | Stop reason: HOSPADM

## 2020-08-05 RX ADMIN — Medication 40 MG: at 08:35

## 2020-08-05 RX ADMIN — METHOCARBAMOL 500 MG: 500 TABLET, FILM COATED ORAL at 02:03

## 2020-08-05 RX ADMIN — METOCLOPRAMIDE HYDROCHLORIDE 10 MG: 5 SOLUTION ORAL at 08:35

## 2020-08-05 RX ADMIN — LORAZEPAM 0.5 MG: 2 SOLUTION, CONCENTRATE ORAL at 20:01

## 2020-08-05 RX ADMIN — AMOXICILLIN AND CLAVULANATE POTASSIUM 875 MG: 400; 57 POWDER, FOR SUSPENSION ORAL at 08:49

## 2020-08-05 RX ADMIN — SENNOSIDES 5 ML: 8.8 LIQUID ORAL at 08:50

## 2020-08-05 RX ADMIN — METHOCARBAMOL 500 MG: 500 TABLET ORAL at 19:31

## 2020-08-05 RX ADMIN — HYDROMORPHONE HYDROCHLORIDE 4 MG: 1 SOLUTION ORAL at 19:29

## 2020-08-05 RX ADMIN — ASPIRIN 325 MG ORAL TABLET 325 MG: 325 PILL ORAL at 08:57

## 2020-08-05 RX ADMIN — LORAZEPAM 1 MG: 2 SOLUTION, CONCENTRATE ORAL at 08:38

## 2020-08-05 RX ADMIN — ACETAMINOPHEN 650 MG: 160 LIQUID ORAL at 03:17

## 2020-08-05 RX ADMIN — METOCLOPRAMIDE HYDROCHLORIDE 10 MG: 5 SOLUTION ORAL at 21:54

## 2020-08-05 RX ADMIN — HYDROMORPHONE HYDROCHLORIDE 4 MG: 1 SOLUTION ORAL at 11:41

## 2020-08-05 RX ADMIN — ACETAMINOPHEN 650 MG: 160 LIQUID ORAL at 21:54

## 2020-08-05 RX ADMIN — MULTIVITAMIN 15 ML: LIQUID ORAL at 08:50

## 2020-08-05 RX ADMIN — INSULIN GLARGINE 30 UNITS: 100 INJECTION, SOLUTION SUBCUTANEOUS at 09:01

## 2020-08-05 RX ADMIN — HYDROMORPHONE HYDROCHLORIDE 5 MG: 1 SOLUTION ORAL at 03:17

## 2020-08-05 RX ADMIN — ACETAMINOPHEN 650 MG: 160 LIQUID ORAL at 15:33

## 2020-08-05 RX ADMIN — AMOXICILLIN AND CLAVULANATE POTASSIUM 875 MG: 400; 57 POWDER, FOR SUSPENSION ORAL at 20:01

## 2020-08-05 RX ADMIN — METHOCARBAMOL 500 MG: 500 TABLET, FILM COATED ORAL at 08:57

## 2020-08-05 RX ADMIN — ACETAMINOPHEN 650 MG: 160 LIQUID ORAL at 09:42

## 2020-08-05 RX ADMIN — GABAPENTIN 300 MG: 250 SUSPENSION ORAL at 21:55

## 2020-08-05 RX ADMIN — HYDROMORPHONE HYDROCHLORIDE 4 MG: 1 SOLUTION ORAL at 15:49

## 2020-08-05 RX ADMIN — GABAPENTIN 300 MG: 250 SUSPENSION ORAL at 15:33

## 2020-08-05 RX ADMIN — HYDROMORPHONE HYDROCHLORIDE 5 MG: 1 SOLUTION ORAL at 07:00

## 2020-08-05 RX ADMIN — GABAPENTIN 300 MG: 250 SUSPENSION ORAL at 06:02

## 2020-08-05 RX ADMIN — HYDROMORPHONE HYDROCHLORIDE 4 MG: 1 SOLUTION ORAL at 23:52

## 2020-08-05 RX ADMIN — METOCLOPRAMIDE HYDROCHLORIDE 10 MG: 5 SOLUTION ORAL at 13:00

## 2020-08-05 ASSESSMENT — ACTIVITIES OF DAILY LIVING (ADL)
ADLS_ACUITY_SCORE: 14

## 2020-08-05 ASSESSMENT — MIFFLIN-ST. JEOR: SCORE: 1243.88

## 2020-08-05 ASSESSMENT — PAIN DESCRIPTION - DESCRIPTORS
DESCRIPTORS: ACHING

## 2020-08-05 NOTE — PLAN OF CARE
VS: stable  Neuro: drowsy but oriented x4  Mobility: up SBA  Discomfort: complains of abdominal pain (receiving scheduled dilaudid and tylenol, PRN robaxin)         and cramping (relieved by passing gas and stooling)   LDAs: CVC removed per order, PIV intact and SL   Labs: WBC 15.4   Glu: checks q4hr 196, 278, 163, patient administered her own lantus and novolog pen correctly with RN instruction  : voiding clear yellow urine  GI: BM's x2 and passing gas  Plan:   Med card reviewed with patient and brother, patient administered her own suspension meds with RN instruction, met with [facility] for TF class with brother Will.

## 2020-08-05 NOTE — PLAN OF CARE
Discharge Planner PT   Patient plan for discharge: local lodging then home with assist  Current status: Pt IND with all mobility within abd precautions- good adherence with supine<>sit, sit<>stand, ambulates x400' no AD slow but steady on feet and no LOB with basic balance challenges. Completes stairs per home set up Jl. Pt has met all PT goals- good ability to continue regular hallway amb and progress activity IND.  Barriers to return to prior living situation: none from mobility  Recommendations for discharge: home with family assist for higher level activities when medically ready  Rationale for recommendations: pt IND, all goals met, good adherence to abd precautions       Entered by: Jodee Orellana 08/05/2020 10:02 AM       Physical Therapy Discharge Summary    Reason for therapy discharge:    All goals and outcomes met, no further needs identified.    Progress towards therapy goal(s). See goals on [facility] record for goal details.  Goals met    Therapy recommendation(s):    No further therapy is recommended. Pt motivated, anticipate good ability to progress IND.

## 2020-08-05 NOTE — PROGRESS NOTES
"Pancreatitis Service - Daily Progress Note  08/05/2020    Assessment & Plan: Dottie Hernandez is a 54 year old female with a past medical history significant for chronic pancreatitis that began 20 years ago attributed to pancreatic divisum, seasonal allergies, migraines, and ANIBAL. She is now s/p TPAIT and splenectomy with stent placement with Dr. Jarvis on 7/27/20.     Cardiorespiratory:   Hypercapnic respiratory failure: Reintubated on POD#2, likely 2/2 meds and likely underlying sleep apnea. Extubated again 7/30/20. Plan to utilize CPAP at night. CXR 7/29 shows \"Mild to moderate bilateral pleural effusions, left greater than right. 2. Hazy opacifications of the left lung may represent atelectasis versus infection. Recommend follow-up to clearing. Stopped IVF. Continue pulmonary hygiene.  Sleep Apnea: Pt states she was just diagnosed with sleep apnea prior to surgery, was not yet using CPAP. Pt is not utilizing CPAP at night because she states the mask makes it more difficult for her to sleep, states she was in the process of obtaining an alternative device as an outpatient.  Heme:  Acute blood loss anemia: Hgb remains stable ~8. EBL intra-op 500mL; received 1unit pRBC. Transfusion 7/29 overnight due to hgb of 6.   GI/Nutrition:   Non-severe malnutrition in the context of acute illness: Continue clamp trials of G tube with gravity drainage overnight. Nausea improved, reglan scheduled per J tube. On PRN Zofran. Tube feeds @ goal, per J-tube at 50mL/hr since 8/1. Will add free water per J tube. Will also add sugar free clear liquid diet. Continue multivitamin.  Fluid/Electrolytes:  CIV ; electrolyte replacements per protocol.   : Armendariz was removed on 7/31, voiding  Post-pancreatectomy diabetes: Transitioned to subcutaneous insulin, endocrine transitioned to twice daily long acting insulin, titrating doses up  Infection: Afebrile;   Leukocytosis: WBC 15.4 (15.1), s/p splenectomy. Will remove central line tomorrow. " Continue to monitor  Positive islet culture: growing kleb pneumo, switched zosyn to augmentin, plan for 14 day total course.  SIRS: Tm 102.5 7/30, now low grade temps. Hypotensive at that time, given fluid boluses and started on empiric vanco in addition to zosyn. Lactate elevated at 2.2. Blood cultures with no growth to date. Vanco and Fluconzole stopped on 7/31. Zosyn transitioned to Augmentin 8/4 to complete 14 days of therapy  Prophylaxis: PPI (Protonix), Anticoagulation: heparin drip to 400 units/hr complete.  Pain control: adequate, current regimen:    -Dilaudid 4-6mg Q4 with discontinuation of block, please start with 4 mg dose  -robaxin 500 mg IV every 6 hours-changed to po, now will schedule at bedtime  -Tylenol scheduled down the J tube  -gabapentin 300mg TID  -ativan 0.5 mg Q12 PRN    -Toradol 15 mg every 6 hours x5 days 7/30-8/3  -ketamine , stopped due to sedation  -Precedex drip off  -On-Q continuous paravertebral blocks removed 8/3.  -Dilaudid PCA stopped 8/2,     Activity: as tolerated; PT/OT ordered  Anticipated LOS/Discharge:  7A, planning for discharge Thursday 8/6    Medical Decision Making:   Medium  Subsequent visit 94898 (moderate level decision making)    ANGELO/Fellow/Resident Provider: Anayeli Lyles PA-C [id number]    Faculty: Steve Jarvis MD  __________________________________________________________________  Transplant History: Admitted 7/27/2020 for TPAIT  7/27/2020 (Islet), Postoperative day: 9     Interval History: History is obtained from the patient  Awake, alert. Ambulating. Nausea improving. Pain control adequate. Having bowel movements.    ROS:   A 10-point review of systems was negative except as noted above.    Curent Meds:    acetaminophen *SUGAR FREE*  650 mg Per J Tube Q6H     amoxicillin-clavulanate  875 mg Per J Tube BID     aspirin  325 mg Oral Daily     gabapentin  300 mg Oral or J tube Q8H [facility] Cent[facility]orphone (STANDARD CONC)  4-6 mg Oral Q4H     insulin aspart  1-9 Units  "Subcutaneous Q4H     insulin glargine  20 Units Subcutaneous QPM     insulin glargine  30 Units Subcutaneous QAM     lactated ringers  250 mL Intravenous Once     lidocaine  1 patch Transdermal Q24h    And     lidocaine   Transdermal Q8H     methocarbamol  500 mg Oral Daily     metoclopramide  10 mg Per J Tube 4x Daily AC & HS     multivitamins w/minerals  15 mL Oral or Feeding Tube Daily     pantoprazole  40 mg Oral or J tube Daily     sennosides  5 mL Oral BID     sodium chloride (PF)  3 mL Intracatheter Q8H       Physical Exam:     Admit Weight: 63.1 kg (139 lb 1.8 oz)    Current Vitals:   /86   Pulse 100   Temp 98.8  F (37.1  C)   Resp 16   Ht 1.651 m (5' 5\")   Wt 63.8 kg (140 lb 10.5 oz)   SpO2 96%   BMI 23.41 kg/m      Vital sign ranges:    Temp:  [98.8  F (37.1  C)-99.3  F (37.4  C)] 98.8  F (37.1  C)  Pulse:  [100] 100  Heart Rate:  [] 103  Resp:  [16-18] 16  BP: (133-150)/(57-88) 136/86  SpO2:  [93 %-98 %] 96 %  Patient Vitals for the past 24 hrs:   BP Temp Temp src Pulse Heart Rate Resp SpO2 Weight   08/05/20 1122 136/86 98.8  F (37.1  C) -- -- 103 16 96 % --   08/05/20 0715 (!) 150/88 99.2  F (37.3  C) -- 100 -- 18 98 % --   08/05/20 0330 139/84 99.3  F (37.4  C) Oral -- 103 16 97 % --   08/05/20 0100 -- -- -- -- -- -- -- 63.8 kg (140 lb 10.5 oz)   08/05/20 0000 135/72 99  F (37.2  C) Oral -- 103 16 96 % --   08/04/20 1938 (!) 143/81 98.9  F (37.2  C) Oral 100 98 16 96 % --   08/04/20 1625 133/57 98.9  F (37.2  C) Oral -- 97 16 93 % --     General Appearance: no acute distress, resting comfortably in bed  Skin: normal, warm, dry, no suspicious lesions or rashes  Heart: regular rate and rhythm  Lungs: nonlabored resps on RA  Abdomen: The abdomen is rounded, soft and mildly tender to palpation. The wound is stapled, c/d/i. G tube clamped. WILMA removed  : Voiding  Extremities: edema:none.   Neuro: Awake, alert, oriented x3    Data:   CMP  Recent Labs   Lab 08/05/20  0554 08/04/20  0653  " 07/30/20  0456    140   < > 139   POTASSIUM 4.5 4.0   < > 3.5   CHLORIDE 102 104   < > 105   CO2 31 31   < > 28   * 92   < > 168*   BUN 18 18   < > 8   CR 0.47* 0.60   < > 0.62   GFRESTIMATED >90 >90   < > >90   GFRESTBLACK >90 >90   < > >90   MONICA 8.2* 8.3*   < > 7.2*   MAG 2.2 2.1   < > 1.9   PHOS 3.6 2.7   < > 1.4*   AMYLASE  --  12*  --   --    LIPASE  --  39*  --   --    ALBUMIN  --   --   --  1.9*   BILITOTAL  --   --   --  0.4   ALKPHOS  --   --   --  72   AST  --   --   --  22   ALT  --   --   --  28    < > = values in this interval not displayed.     CBC  Recent Labs   Lab 08/05/20  0554 08/04/20  0653   HGB 8.3* 8.6*   WBC 15.4* 15.1*   * 1,024*     Coags  Recent Labs   Lab 08/05/20  0554 08/04/20  0653   PTT 26 30      Urinalysis  Recent Labs   Lab Test 07/29/20  0924 07/23/20  0805   COLOR Light Yellow Yellow   APPEARANCE Clear Slightly Cloudy   URINEGLC Negative Negative   URINEBILI Negative Negative   URINEKETONE Negative 5*   SG 1.018 1.020   UBLD Negative Negative   URINEPH 5.5 6.0   PROTEIN Negative Negative   NITRITE Negative Negative   LEUKEST Negative Large*   RBCU 3* 6*   WBCU 3 94*

## 2020-08-05 NOTE — PLAN OF CARE
"/84 (BP Location: Left arm)   Pulse 100   Temp 99.3  F (37.4  C) (Oral)   Resp 16   Ht 1.651 m (5' 5\")   Wt 63.8 kg (140 lb 10.5 oz)   SpO2 97%   BMI 23.41 kg/m      9888-1534  Vitals: T-99.3 oral, HR: 103, OVSS  Neuro: Pt. alert & Ox4  Behavior: Pt. calm & cooperative with cares.    Activity: Pt. up with SBA  LDAs: Right internal jugular saline locked. GT to gravity. NJ tube for tube feeds.  Cardiac: Tachy 103  Respiratory: On 1L/NC overnight for sleep apnea, continuous pulse ox.  GI/: Pt. voiding adequate amounts, 1 loose stool this shift.   Skin: Incision stapled & c/d/i  Endocrine: BG 85 & 126-1 unit sliding scale insulin.   Pain: Abdominal pain managed with sched. oral Dilaudid, Gabapentin, Robaxin & Tylenol.  Nausea: Pt. had no c/o's nausea.  Diet: NPO, tube feeds at 50cc/hour into NJ, which is goal rate.  Labs/Imaging: Pt. triggered sepsis protocol, Lactic 0.6  Education: Tube feed class at 1330 today in [facility].  Plan: Continue to follow POC & notify MD with change in status.        "

## 2020-08-05 NOTE — PROGRESS NOTES
Left a sample packet of home insulin pen needles with patient, and reviewed use.    Will meet with patient and brother tomorrow, 8/6 at about 2 pm, in patient's room.  Kary Haines MS, APRN, CNS, CDE, CDTC  [phone]

## 2020-08-05 NOTE — PLAN OF CARE
"BP (!) 143/81 (BP Location: Left arm)   Pulse 100   Temp 98.9  F (37.2  C) (Oral)   Resp 16   Ht 1.651 m (5' 5\")   Wt 65 kg (143 lb 3.2 oz)   SpO2 96%   BMI 23.83 kg/m       4469-3532  Intermittently tachy. Continuous 02 monitor due to history of O2 levels dropping to 88% on RA while patient sleeps. Patient has not dropped below 92%; remains on RA. BS (). Patient now transitioned off of insulin drip @ 2130. Given Lantus @ 1930. Last BS check @ 2200; 102. Pain controlled with scheduled medications. Denies nausea. Up with SB, calls appropriately. Triple lumen saline locked. Continuous enteral feeds @ 50 ml/hr with relizorb changed at 1900. NPO with ice chips. G-tube clamped 8001-0214 and 3148-8112. G-tube now open to gravity. Patient tolerated clamping very well! Incision stapled; clean dry and intact. Patient showered this evening- in good spirits. Continue plan of care, please notify MD with any changes.   "

## 2020-08-05 NOTE — CONSULTS
Met with Dottie and her brother Will for enteral TF education. Dottie was able to teach back and demonstrate how to flush her tube, how to administer meds, and how to set up and run the TF via Jonathan Feed and flush. She has been practicing a lot of these cares already with her RN. She feel confident that she will be able to manage this at home.    Literature given: Handwashing and Skin Care, Caring for Your Tube at Home, and Using the Jonathan Pump for Tube Feeding

## 2020-08-05 NOTE — PROGRESS NOTES
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 Jarvis 7/27/2020 (872 IE/kg), post-op course complicated by Somnolence, for which she was Re-intubated 7/29, then extubated 7/30.        Total pancreatectomy with auto-islet cell transplant : A1C 6% on (7/23/2020) indicating pre-diabetes most likely 2/2 chronic pancreatitis     BG rising significantly overnight to high 100s.  Noon BG very high following additional elixir/suspension, patient self-injection though unclear what the contribution of either is.    Plan  - continue glargine 30 units qAM and increase from 15 to 20 units qPM   - aspart custom scale 1 per 25 for BG>120, q4h  - may need to reschedule for more even distribution of elixir/suspension med volumes  -goal glucose   -hypoglycemia protocol  -D10W PRN for hypoglyc prevention in case of TF interruptions               -diabetes education will be bedside on Thursday afternoon, outpt needles dropped off for practicing today       Outpatient diabetes follow up: TBD--none currently scheduled.  Pt anticipates follow up with Dr. López while staying locally, then to establish with endocrinologist near her home in [place] (her GI team will refer her).  Does not have appts with diab educ RN or RD.      Discussed w/ pt, brother, RN, CDE, pharmacist           Interval History:   The last 24 hours progress and nursing notes reviewed.    Impact Peptide 1.5 at 50 ml.h since 1400 on 8/1. +BM.  Dextrose fluids decreased to 10 ml/hour 7/31.  Started on glargine 29 units for transition off IV insulin. Added 9 units at 2000, given BG persisting in 200s. Still high at midnight, so insulin drip started.  To glargine 30+ 15 units yesterday      Up to walk with brother.   Sleep a bit better but still often disrupted.    g-tube  "clamping is difficult, but getting relief from Reglan already    Is receiving medications in solution ( gabapentin TID, hydromorphone q4h, multivitamins, and prn Phos replacement)   Hydroxyzine soln is notably large volume- 20 ml.  Acetaminophen changed to sugar-free yesterday.  Pt tolerated it much better.  Started Augmentin and Reglan via tube this morning    Shaina gave herself her glargine this morning.  No insulin leakage or needle issues noted. Her brother is practicing injections, too.    Note Shaina felt shaky this morning and wondered if BG was low-- it was 196    Shaina is planning to have a series of caregivers, so working on notes to keep everyone informed.  Goal discharge Thursday    Nutrition:   Impact peptide 1.5 via jejunostomy  goal 50 ml/hour      Recent Labs   Lab 08/05/20  0734 08/05/20  0554 08/05/20  0332 08/04/20  2359 08/04/20  2331 08/04/20  2205 08/04/20  2106  08/04/20  0653  08/03/20  0557  08/02/20  0743  08/01/20  0557  07/31/20  0416   GLC  --  160*  --   --   --   --   --   --  92  --  117*  --  142*  --  105*  --  175*   *  --  126* 85 98 102* 93   < >  --    < >  --    < >  --    < >  --    < > 175*    < > = values in this interval not displayed.               Review of Systems:   See interval hx          Medications impacting glucose:   See above               Physical Exam:     BP (!) 150/88   Pulse 100   Temp 99.2  F (37.3  C)   Resp 18   Ht 1.651 m (5' 5\")   Wt 63.8 kg (140 lb 10.5 oz)   SpO2 98%   BMI 23.41 kg/m       General:  pleasant woman resting in bed, in no acute distress.  Brother Will at bedside  HEENT: NC/AT, PER and anicteric, non-injected, oral mucous membranes moist.   Lungs: unlabored respiration, no cough  ABD: rounded, g-tube clamped  Skin: warm and dry, no obvious lesions  MSK:  fluid movement of all extremities  Mental status:  Sitting up and fully alert, answering appropriately  Psych:  calm, easily engaged    BP (!) 150/88   Pulse 100   Temp " "99.2  F (37.3  C)   Resp 18   Ht 1.651 m (5' 5\")   Wt 63.8 kg (140 lb 10.5 oz)   SpO2 98%   BMI 23.41 kg/m             Data:     Lab Results   Component Value Date    A1C 6.0 07/23/2020            Recent Labs   Lab Test 08/05/20  0554 08/04/20  0653    140   POTASSIUM 4.5 4.0   CHLORIDE 102 104   CO2 31 31   ANIONGAP 5 5   * 92   BUN 18 18   CR 0.47* 0.60   MONICA 8.2* 8.3*     CBC RESULTS:   Recent Labs   Lab Test 08/05/20  0554   WBC 15.4*   RBC 2.93*   HGB 8.3*   HCT 27.0*   MCV 92   MCH 28.3   MCHC 30.7*   RDW 15.2*   *       I spent a total of 35 minutes bedside and on the inpatient unit managing the glycemic care of Dottie Hernandez. Over 50% of my time on the unit was spent counseling the patient and brother and/or coordinating care regarding acute BG management.  See note for details.    Dionebe Madrid APRN [facility] [phone]  Diabetes Management job code [id number]            "

## 2020-08-06 ENCOUNTER — HOME INFUSION (PRE-WILLOW HOME INFUSION) (OUTPATIENT)
Dept: PHARMACY | Facility: CLINIC | Age: 55
End: 2020-08-06

## 2020-08-06 ENCOUNTER — PATIENT OUTREACH (OUTPATIENT)
Dept: CARE COORDINATION | Facility: CLINIC | Age: 55
End: 2020-08-06

## 2020-08-06 VITALS
OXYGEN SATURATION: 96 % | BODY MASS INDEX: 23.14 KG/M2 | SYSTOLIC BLOOD PRESSURE: 131 MMHG | TEMPERATURE: 98.3 F | WEIGHT: 138.89 LBS | HEART RATE: 107 BPM | DIASTOLIC BLOOD PRESSURE: 80 MMHG | HEIGHT: 65 IN | RESPIRATION RATE: 16 BRPM

## 2020-08-06 DIAGNOSIS — Z98.890 POST-OPERATIVE STATE: Primary | ICD-10-CM

## 2020-08-06 PROBLEM — Z90.410 POST-PANCREATECTOMY DIABETES (H): Status: ACTIVE | Noted: 2020-08-06

## 2020-08-06 PROBLEM — Z90.81 H/O SPLENECTOMY: Status: ACTIVE | Noted: 2020-08-06

## 2020-08-06 PROBLEM — G89.18 ACUTE POST-OPERATIVE PAIN: Status: ACTIVE | Noted: 2020-08-06

## 2020-08-06 PROBLEM — E89.1 POST-PANCREATECTOMY DIABETES (H): Status: ACTIVE | Noted: 2020-08-06

## 2020-08-06 PROBLEM — E13.9 POST-PANCREATECTOMY DIABETES (H): Status: ACTIVE | Noted: 2020-08-06

## 2020-08-06 LAB
ALBUMIN UR-MCNC: NEGATIVE MG/DL
AMORPH CRY #/AREA URNS HPF: ABNORMAL /HPF
ANION GAP SERPL CALCULATED.3IONS-SCNC: 5 MMOL/L (ref 3–14)
APPEARANCE UR: ABNORMAL
APTT PPP: 33 SEC (ref 22–37)
BASOPHILS # BLD AUTO: 0.1 10E9/L (ref 0–0.2)
BASOPHILS NFR BLD AUTO: 0.3 %
BILIRUB UR QL STRIP: NEGATIVE
BUN SERPL-MCNC: 18 MG/DL (ref 7–30)
C DIFF TOX B STL QL: NEGATIVE
CALCIUM SERPL-MCNC: 8.8 MG/DL (ref 8.5–10.1)
CHLORIDE SERPL-SCNC: 101 MMOL/L (ref 94–109)
CO2 SERPL-SCNC: 28 MMOL/L (ref 20–32)
COLOR UR AUTO: YELLOW
CREAT SERPL-MCNC: 0.37 MG/DL (ref 0.52–1.04)
DIFFERENTIAL METHOD BLD: ABNORMAL
EOSINOPHIL # BLD AUTO: 1.4 10E9/L (ref 0–0.7)
EOSINOPHIL NFR BLD AUTO: 7.7 %
ERYTHROCYTE [DISTWIDTH] IN BLOOD BY AUTOMATED COUNT: 15.5 % (ref 10–15)
GFR SERPL CREATININE-BSD FRML MDRD: >90 ML/MIN/{1.73_M2}
GLUCOSE BLDC GLUCOMTR-MCNC: 140 MG/DL (ref 70–99)
GLUCOSE BLDC GLUCOMTR-MCNC: 146 MG/DL (ref 70–99)
GLUCOSE BLDC GLUCOMTR-MCNC: 192 MG/DL (ref 70–99)
GLUCOSE BLDC GLUCOMTR-MCNC: 198 MG/DL (ref 70–99)
GLUCOSE BLDC GLUCOMTR-MCNC: 220 MG/DL (ref 70–99)
GLUCOSE SERPL-MCNC: 162 MG/DL (ref 70–99)
GLUCOSE UR STRIP-MCNC: NEGATIVE MG/DL
HCT VFR BLD AUTO: 29.7 % (ref 35–47)
HGB BLD-MCNC: 8.9 G/DL (ref 11.7–15.7)
HGB UR QL STRIP: NEGATIVE
IMM GRANULOCYTES # BLD: 0.2 10E9/L (ref 0–0.4)
IMM GRANULOCYTES NFR BLD: 1.2 %
KETONES UR STRIP-MCNC: NEGATIVE MG/DL
LACTATE BLD-SCNC: 1 MMOL/L (ref 0.7–2)
LEUKOCYTE ESTERASE UR QL STRIP: NEGATIVE
LYMPHOCYTES # BLD AUTO: 2.1 10E9/L (ref 0.8–5.3)
LYMPHOCYTES NFR BLD AUTO: 11.4 %
MAGNESIUM SERPL-MCNC: 2.1 MG/DL (ref 1.6–2.3)
MCH RBC QN AUTO: 28.2 PG (ref 26.5–33)
MCHC RBC AUTO-ENTMCNC: 30 G/DL (ref 31.5–36.5)
MCV RBC AUTO: 94 FL (ref 78–100)
MONOCYTES # BLD AUTO: 1.4 10E9/L (ref 0–1.3)
MONOCYTES NFR BLD AUTO: 8 %
MUCOUS THREADS #/AREA URNS LPF: PRESENT /LPF
NEUTROPHILS # BLD AUTO: 12.8 10E9/L (ref 1.6–8.3)
NEUTROPHILS NFR BLD AUTO: 71.4 %
NITRATE UR QL: NEGATIVE
NRBC # BLD AUTO: 0.7 10*3/UL
NRBC BLD AUTO-RTO: 4 /100
PH UR STRIP: 7.5 PH (ref 5–7)
PHOSPHATE SERPL-MCNC: 3 MG/DL (ref 2.5–4.5)
PLATELET # BLD AUTO: 1300 10E9/L (ref 150–450)
POTASSIUM SERPL-SCNC: 4.7 MMOL/L (ref 3.4–5.3)
RBC # BLD AUTO: 3.16 10E12/L (ref 3.8–5.2)
RBC #/AREA URNS AUTO: 2 /HPF (ref 0–2)
SODIUM SERPL-SCNC: 134 MMOL/L (ref 133–144)
SOURCE: ABNORMAL
SP GR UR STRIP: 1.02 (ref 1–1.03)
SPECIMEN SOURCE: NORMAL
TRANS CELLS #/AREA URNS HPF: <1 /HPF (ref 0–1)
UROBILINOGEN UR STRIP-MCNC: NORMAL MG/DL (ref 0–2)
WBC # BLD AUTO: 18 10E9/L (ref 4–11)
WBC #/AREA URNS AUTO: 1 /HPF (ref 0–5)

## 2020-08-06 PROCEDURE — 27210437 ZZH NUTRITION PRODUCT SEMIELEM INTERMED LITER

## 2020-08-06 PROCEDURE — 40000275 ZZH STATISTIC RCP TIME EA 10 MIN

## 2020-08-06 PROCEDURE — 80048 BASIC METABOLIC PNL TOTAL CA: CPT | Performed by: NURSE PRACTITIONER

## 2020-08-06 PROCEDURE — 87493 C DIFF AMPLIFIED PROBE: CPT | Performed by: PHYSICIAN ASSISTANT

## 2020-08-06 PROCEDURE — 83735 ASSAY OF MAGNESIUM: CPT | Performed by: NURSE PRACTITIONER

## 2020-08-06 PROCEDURE — 25000128 H RX IP 250 OP 636: Performed by: PHYSICIAN ASSISTANT

## 2020-08-06 PROCEDURE — 25000132 ZZH RX MED GY IP 250 OP 250 PS 637: Performed by: PHYSICIAN ASSISTANT

## 2020-08-06 PROCEDURE — 25000132 ZZH RX MED GY IP 250 OP 250 PS 637: Performed by: TRANSPLANT SURGERY

## 2020-08-06 PROCEDURE — 85025 COMPLETE CBC W/AUTO DIFF WBC: CPT | Performed by: NURSE PRACTITIONER

## 2020-08-06 PROCEDURE — 00000146 ZZHCL STATISTIC GLUCOSE BY METER IP

## 2020-08-06 PROCEDURE — 36415 COLL VENOUS BLD VENIPUNCTURE: CPT | Performed by: NURSE PRACTITIONER

## 2020-08-06 PROCEDURE — 87086 URINE CULTURE/COLONY COUNT: CPT | Performed by: PHYSICIAN ASSISTANT

## 2020-08-06 PROCEDURE — 84100 ASSAY OF PHOSPHORUS: CPT | Performed by: NURSE PRACTITIONER

## 2020-08-06 PROCEDURE — 85730 THROMBOPLASTIN TIME PARTIAL: CPT | Performed by: NURSE PRACTITIONER

## 2020-08-06 PROCEDURE — 81001 URINALYSIS AUTO W/SCOPE: CPT | Performed by: PHYSICIAN ASSISTANT

## 2020-08-06 PROCEDURE — 83605 ASSAY OF LACTIC ACID: CPT | Performed by: PHYSICIAN ASSISTANT

## 2020-08-06 PROCEDURE — 25000132 ZZH RX MED GY IP 250 OP 250 PS 637: Performed by: NURSE PRACTITIONER

## 2020-08-06 PROCEDURE — 36415 COLL VENOUS BLD VENIPUNCTURE: CPT | Performed by: PHYSICIAN ASSISTANT

## 2020-08-06 RX ORDER — ASPIRIN 325 MG
325 TABLET ORAL DAILY
Qty: 30 TABLET | Refills: 11 | Status: SHIPPED | OUTPATIENT
Start: 2020-08-06 | End: 2020-09-03

## 2020-08-06 RX ORDER — LIDOCAINE 4 G/G
1 PATCH TOPICAL EVERY 24 HOURS
Qty: 5 PATCH | Refills: 0 | Status: SHIPPED | OUTPATIENT
Start: 2020-08-06 | End: 2020-09-03

## 2020-08-06 RX ORDER — GABAPENTIN 250 MG/5ML
300 SOLUTION ORAL EVERY 8 HOURS
Qty: 540 ML | Refills: 11 | Status: SHIPPED | OUTPATIENT
Start: 2020-08-06 | End: 2020-09-03

## 2020-08-06 RX ORDER — METOCLOPRAMIDE HYDROCHLORIDE 5 MG/5ML
10 SOLUTION ORAL
Qty: 560 ML | Refills: 0 | Status: SHIPPED | OUTPATIENT
Start: 2020-08-06 | End: 2020-09-03

## 2020-08-06 RX ORDER — HYDROMORPHONE HYDROCHLORIDE 1 MG/ML
4-6 SOLUTION ORAL EVERY 4 HOURS
Qty: 360 ML | Refills: 0 | Status: SHIPPED | OUTPATIENT
Start: 2020-08-06 | End: 2020-08-13

## 2020-08-06 RX ORDER — NICOTINE POLACRILEX 4 MG
LOZENGE BUCCAL
Qty: 3 TUBE | Refills: 0 | Status: SHIPPED | OUTPATIENT
Start: 2020-08-06

## 2020-08-06 RX ORDER — LORAZEPAM 2 MG/ML
0.5 CONCENTRATE ORAL EVERY 12 HOURS PRN
Qty: 8 ML | Refills: 0 | Status: SHIPPED | OUTPATIENT
Start: 2020-08-06 | End: 2020-09-03

## 2020-08-06 RX ORDER — BLOOD PRESSURE TEST KIT
KIT MISCELLANEOUS
Qty: 100 EACH | Refills: 0 | Status: SHIPPED | OUTPATIENT
Start: 2020-08-06 | End: 2020-09-03

## 2020-08-06 RX ORDER — METHOCARBAMOL 500 MG/1
500 TABLET, FILM COATED ORAL AT BEDTIME
Qty: 30 TABLET | Refills: 0 | Status: SHIPPED | OUTPATIENT
Start: 2020-08-06 | End: 2020-08-27

## 2020-08-06 RX ORDER — ONDANSETRON 4 MG/1
4 TABLET, ORALLY DISINTEGRATING ORAL EVERY 6 HOURS PRN
Qty: 40 TABLET | Refills: 0 | Status: SHIPPED | OUTPATIENT
Start: 2020-08-06 | End: 2020-08-22

## 2020-08-06 RX ORDER — AMOXICILLIN AND CLAVULANATE POTASSIUM 400; 57 MG/5ML; MG/5ML
875 POWDER, FOR SUSPENSION ORAL 2 TIMES DAILY
Qty: 109 ML | Refills: 0 | Status: SHIPPED | OUTPATIENT
Start: 2020-08-06 | End: 2020-08-11

## 2020-08-06 RX ORDER — CONTAINER,EMPTY
EACH MISCELLANEOUS
Qty: 1 EACH | Refills: 0 | Status: SHIPPED | OUTPATIENT
Start: 2020-08-06 | End: 2020-09-03

## 2020-08-06 RX ADMIN — ONDANSETRON 4 MG: 4 TABLET, ORALLY DISINTEGRATING ORAL at 15:08

## 2020-08-06 RX ADMIN — METOCLOPRAMIDE HYDROCHLORIDE 10 MG: 5 SOLUTION ORAL at 15:46

## 2020-08-06 RX ADMIN — HYDROMORPHONE HYDROCHLORIDE 4 MG: 1 SOLUTION ORAL at 03:32

## 2020-08-06 RX ADMIN — INSULIN GLARGINE 30 UNITS: 100 INJECTION, SOLUTION SUBCUTANEOUS at 07:48

## 2020-08-06 RX ADMIN — METOCLOPRAMIDE HYDROCHLORIDE 10 MG: 5 SOLUTION ORAL at 11:14

## 2020-08-06 RX ADMIN — GABAPENTIN 300 MG: 250 SUSPENSION ORAL at 07:40

## 2020-08-06 RX ADMIN — ONDANSETRON 4 MG: 4 TABLET, ORALLY DISINTEGRATING ORAL at 06:06

## 2020-08-06 RX ADMIN — ACETAMINOPHEN 650 MG: 160 LIQUID ORAL at 03:32

## 2020-08-06 RX ADMIN — AMOXICILLIN AND CLAVULANATE POTASSIUM 875 MG: 400; 57 POWDER, FOR SUSPENSION ORAL at 09:06

## 2020-08-06 RX ADMIN — MULTIVITAMIN 15 ML: LIQUID ORAL at 09:23

## 2020-08-06 RX ADMIN — ASPIRIN 325 MG ORAL TABLET 325 MG: 325 PILL ORAL at 15:01

## 2020-08-06 RX ADMIN — ACETAMINOPHEN 650 MG: 160 LIQUID ORAL at 09:06

## 2020-08-06 RX ADMIN — GABAPENTIN 300 MG: 250 SUSPENSION ORAL at 15:02

## 2020-08-06 RX ADMIN — HYDROMORPHONE HYDROCHLORIDE 5 MG: 1 SOLUTION ORAL at 15:02

## 2020-08-06 RX ADMIN — ACETAMINOPHEN 650 MG: 160 LIQUID ORAL at 15:46

## 2020-08-06 RX ADMIN — HYDROMORPHONE HYDROCHLORIDE 4 MG: 1 SOLUTION ORAL at 11:14

## 2020-08-06 RX ADMIN — HYDROMORPHONE HYDROCHLORIDE 4 MG: 1 SOLUTION ORAL at 07:39

## 2020-08-06 RX ADMIN — Medication 40 MG: at 07:40

## 2020-08-06 RX ADMIN — METOCLOPRAMIDE HYDROCHLORIDE 10 MG: 5 SOLUTION ORAL at 07:40

## 2020-08-06 ASSESSMENT — ACTIVITIES OF DAILY LIVING (ADL)
ADLS_ACUITY_SCORE: 14

## 2020-08-06 ASSESSMENT — MIFFLIN-ST. JEOR: SCORE: 1235.88

## 2020-08-06 ASSESSMENT — PAIN DESCRIPTION - DESCRIPTORS
DESCRIPTORS: ACHING

## 2020-08-06 NOTE — PROGRESS NOTES
Transplant Social Work Service Discharge Note      Patient Name:  Dottie Hernandez     Anticipated Discharge Date:  8/6/2020    Discharge Disposition: [facility] in [place]    Plan for 24 hour care for immediate post transplant period: Brother Shaun, friend Justina, and her son Carol plan on taking turns assisting patient post-hospitalization     If not local, plans for short term stay:  Staying at Hotel in [place], [place]    Additional Services/Equipment Arranged: RNCC set up appropriate home care and ATC visits. No further SW needs.    Persons notified of above discharge plan:  Patient, patient's caregiver's, treatment team, nursing staff, ATC, home care    Patient / Family response to discharge plan:  Agreeable    Education and resources provided by SW at discharge: Social Work role inpatient setting and outpatient social work contact information.    Discussed anticipated pharmacy out of pocket costs: YES    Provided [employer] Donor Letter Writing packet : N/A    Plan: Patient will discharge home into the care of her friends and family at the [facility] in [place], [place]. Patient will attend ATC and then have home care through [facility]. No SW needs identified at discharge.    ARTURO Pettit, LGJACKIE  7A   Ph: [phone]  Pager: [phone]

## 2020-08-06 NOTE — DISCHARGE INSTRUCTIONS
_NOVOLOG (Aspart) fast acting insulin    Admin Instructions:   Correction Scale - custom DOSING     Do Not give Correction Insulin if BG less than 121.  -140 = 1 unit.  -160 = 2 units.  -180 = 3 units.  -200 = 4 units.  -220 = 5 units.  -240 = 6 units.  -260 = 7 units.  -280 = 8 units.  -300 = 9 units.  BG >300 = 10 units.    Check blood glucose Q4H and administer based on blood glucose     _______________________________________________________  Discharge RN please fax discharge orders to home care agency: [facility]  --they need signed discharge orders by 12 noon on the day of discharge  ---call Sumaya Rivas @ [phone] Monday-Friday  ---weekends call office [phone]  ________________________________________________________

## 2020-08-06 NOTE — PLAN OF CARE
Focus: Discharge  D/I: Pt deemed appropriate for discharge. Pain well managed on scheduled elixir dilaudid, tylenol and gabapentin. Did c/o nausea this am, gtube placed to gravity and reglan given with relief. Gtube remained to gravity for most of shift, clamped at 1500 for discharge. 's, covered with sliding scale and scheduled lantus given. Afebrile this shift but low grade temp overnight, cdiff spec sent, pending results. UA/UC also sent. Home care RN visited and set up home TF pump with rate of 50cc/hour. All supplies for TF sent with patient, instruction and return demo of med administration, pump settings and relizorb cartridge exchange done. G/J tube dressing changed with instructions given to pt, all supplies for G/J tube including extra Gtube bags given for home. Pt met with DM educator, all DM supplies being filled in pharmacy. Pt did administer her novolog this afternoon, with nursing giving cues. Also met with Gena, coordinator. All belongings collected from room and will be brought to front doors with patient for private transportation. Discharge papers reviewed and questions answered.   A/P: Plan for f/up appt tomorrow in clinic. Home care to also see pt at hotel where pt and brother are staying locally.

## 2020-08-06 NOTE — CONSULTS
Diabetes CNS Consult  Received consult request to see this 55 year old female and her brother for diabetes education.  Patient is s/p TPIAT.  She did received diabetes education prior to her surgery.    Met with patient and brother.  Patient has been administering insulin doses using home pen needles today.  She and brother are able to state when to check her blood glucose, and what her target range is to be.    Patient states she has a blood glucose monitor kit at her hotel room, and knows how to use.  Per outpatient diabetes educator note, she has a Contour Next USB.      Patient and brother understand how to use the rapid-acting correction scale.    Discussed hypoglycemia signs/symptoms and treatment using either 1/2 cup apple juice down feeding tube, or 15 grams glucose gel by mouth. Discussed checking glucose 15 minutes after treatment hypoglycemia, and re-treating if glucose still <80 mg/dL  Also discussed and demonstrated use of nasal glucagon (Baqsimi) for severe hypoglycemia.  They stated understanding.    Kary Haines MS, APRN, CNS, CDE, CDTC  [phone]

## 2020-08-06 NOTE — PROGRESS NOTES
Addendum  8/6/20   4597  Shaina is discharging today and going home on continuous enteral feeds   She and Will hve been to the [facility] and have been doing some of her own enteral cares including med administration via her tube as well as insulin administration.  She needs a change over from hospital pump to home feeding and pump.    Met with Shaina and Shaun at bedside once supplies arrived.   Had Will program the pump, set up feeding, prime tubing,  load pump and bag into the backpack and initiate the feeding.   Instructed in max 12 hr hang time for formula, to change Relizorb cassette q 12 hrs and and to use a new feeding bag q 24 hrs.  Provided information about supplies and supply delivery, storage of formula, use of clog zapper and 24/7 availability of [facility] staff.   Shaina has f/u appt in clinic tomorrow and will be seen by [facility] on Sat.     Shaina and Will verbalized understanding of all information given.  Her TF is running and she is ready for discharge from [facility] perspective.       Home Infusion  Shaina is expected to discharge tomorrow and will be going home on continuous enteral feeds with with Relizorb cassette s/p TPAIT.   She has been to the [facility] and has received teaching on enteral feeds, blood sugar monitoring and insulin administration.   she is from [place] but will be staying locally in a hotel in [facility] after discharge and her brother Will and a friend Rosalva will be her CGs .    Met with Shaina and Shaun at bedside and informed themr about [facility] services and plan for discharge.   Explained about home enteral pump and backpack for mobility while on continuous feeds.   Informed them that some formula and supplies will be delivered to the hospital on day of discharge and I will assist with hook up of home enteral feed and provide some additional teaching and information.  Explained about plan for appt in clinic day after discharge first home nurse visit the day following (Sat).  Talked about supplies and supply deliveries as  well as 24/7 availability of I staff while on enteral therapy.       Shaina and Will verbalized understanding of all information given.   They are willing and able to learn and manage home enteral therapy.       Will continue to follow and update pt with more details once discharge is confirmed.    Sumaya TABOR  [facility] Liaison  [phone] LLOYD [email or web address]: [phone]

## 2020-08-06 NOTE — PROGRESS NOTES
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 Jarvis 7/27/2020 (872 IE/kg), post-op course complicated by Somnolence, for which she was Re-intubated 7/29, then extubated 7/30.        Total pancreatectomy with auto-islet cell transplant : A1C 6% on (7/23/2020) indicating pre-diabetes most likely 2/2 chronic pancreatitis     BG into target last evening, then rising overnight once again and further after large morning volume of meds via tube.    Plan  - continue glargine 30 units qAM and increase from 20 to 25 units qPM   - increase aspart custom scale 1 per 20 for BG>120, q4h  - may need to reschedule for more even distribution of elixir/suspension med volumes--> pharmacy will reschedule protonix, MVI, sennokot for admin times later than 0800.  RN will update the pt's med card for home  -goal glucose   -hypoglycemia protocol  -D10W PRN for hypoglyc prevention in case of TF interruptions               -diabetes education will be bedside this afternoon       Outpatient diabetes follow up: Care coordinator confirms pt will follow up with Dr. López while staying locally (and diabetes educ), then to establish with endocrinologist near her home in [place] (her GI team will refer her).        Discussed w/ pt, brother, RN, pharmacist, primary team, message to transplant coordinator           Interval History:   The last 24 hours progress and nursing notes reviewed.    Impact Peptide 1.5 at 50 ml.h since 1400 on 8/1. +BM.  Dextrose fluids decreased to 10 ml/hour 7/31.  Started on glargine 29 units for transition off IV insulin..  Daily or BID escalation of dosing glargine and needed IV insulin restarted for half a day.  To glargine 30+ 20 units yesterday      Walking has been steady from day to day.  Discussed expectation  "for increased activity after discharge, thus increasing insulin sensitivity.  G-tube clamping-- all night then quite uncomfortable this morning.  Discussed impact morning cortisol rise (and potentially stress r/t nausea) on BG rising in AM  Relizorb change timing has been variable, though uncertain what acute impact on TF absorption/BG might be if it is late.    Is receiving medications in solution ( gabapentin TID, hydromorphone q4h, multivitamins, reglan, robaxin, augmentin, tylenol-- Started Augmentin and Reglan via tube Wed)  Hydroxyzine soln is notably large volume- 20 ml-- last taken Monday AM  Acetaminophen changed to sugar-free Tues  Pt tolerated it much better.          Shaina is planning to have a series of caregivers, so working on notes to keep everyone informed.  Will concerned about the Saturday handoff when he will be leaving hours before new caregiver arrives.    Goal discharge today after teaching completed. Shaina feels better.  Understands insulin matched to nutrition and activity impact on needs.      Nutrition:   Impact peptide 1.5 via jejunostomy  goal 50 ml/hour      Recent Labs   Lab 08/06/20  0747 08/06/20  0539 08/06/20  0338 08/05/20  2356 08/05/20  2134 08/05/20  1937 08/05/20  1542  08/05/20  0554  08/04/20  0653  08/03/20  0557  08/02/20  0743  08/01/20  0557   GLC  --  162*  --   --   --   --   --   --  160*  --  92  --  117*  --  142*  --  105*   *  --  146* 140* 115* 109* 163*   < >  --    < >  --    < >  --    < >  --    < >  --     < > = values in this interval not displayed.               Review of Systems:   See interval hx          Medications impacting glucose:   See above               Physical Exam:     BP (!) 144/89   Pulse 107   Temp 99.5  F (37.5  C) (Oral)   Resp 18   Ht 1.651 m (5' 5\")   Wt 63 kg (138 lb 14.2 oz)   SpO2 98%   BMI 23.11 kg/m       General:  pleasant woman resting in bed, in no acute distress.  Brother Will at bedside and supportive  HEENT: " "NC/AT, PER and anicteric, non-injected, oral mucous membranes moist.   Lungs: unlabored respiration, no cough  ABD: rounded, g-tube clamped  MSK:  fluid movement of all extremities  Mental status:  Sitting up and fully alert, answering appropriately  Psych:  calm, easily engaged    BP (!) 144/89   Pulse 107   Temp 99.5  F (37.5  C) (Oral)   Resp 18   Ht 1.651 m (5' 5\")   Wt 63 kg (138 lb 14.2 oz)   SpO2 98%   BMI 23.11 kg/m             Data:     Lab Results   Component Value Date    A1C 6.0 07/23/2020            Recent Labs   Lab Test 08/06/20  0539 08/05/20  0554    138   POTASSIUM 4.7 4.5   CHLORIDE 101 102   CO2 28 31   ANIONGAP 5 5   * 160*   BUN 18 18   CR 0.37* 0.47*   MONICA 8.8 8.2*     CBC RESULTS:   Recent Labs   Lab Test 08/06/20  0539   WBC 18.0*   RBC 3.16*   HGB 8.9*   HCT 29.7*   MCV 94   MCH 28.2   MCHC 30.0*   RDW 15.5*   PLT 1,300*   I spent a total of 35 minutes bedside and on the inpatient unit managing the glycemic care of Dottie Hernandez. Over 50% of my time on the unit was spent counseling the patient and brother and/or coordinating care regarding discharge insulin plan.  See note for details.    Dione Madrid APRN -[id number]  Diabetes Management job code [id number]            "

## 2020-08-07 ENCOUNTER — INFUSION THERAPY VISIT (OUTPATIENT)
Dept: INFUSION THERAPY | Facility: CLINIC | Age: 55
End: 2020-08-07
Attending: SURGERY
Payer: COMMERCIAL

## 2020-08-07 ENCOUNTER — TELEPHONE (OUTPATIENT)
Dept: ENDOCRINOLOGY | Facility: CLINIC | Age: 55
End: 2020-08-07

## 2020-08-07 VITALS
RESPIRATION RATE: 16 BRPM | OXYGEN SATURATION: 94 % | TEMPERATURE: 98.3 F | SYSTOLIC BLOOD PRESSURE: 118 MMHG | DIASTOLIC BLOOD PRESSURE: 68 MMHG

## 2020-08-07 DIAGNOSIS — E89.1 POST-PANCREATECTOMY DIABETES (H): Primary | ICD-10-CM

## 2020-08-07 DIAGNOSIS — Z98.890 POST-OPERATIVE STATE: ICD-10-CM

## 2020-08-07 DIAGNOSIS — Z90.410 POST-PANCREATECTOMY DIABETES (H): Primary | ICD-10-CM

## 2020-08-07 DIAGNOSIS — E13.9 POST-PANCREATECTOMY DIABETES (H): Primary | ICD-10-CM

## 2020-08-07 LAB
ALBUMIN SERPL-MCNC: 3 G/DL (ref 3.4–5)
ALP SERPL-CCNC: 279 U/L (ref 40–150)
ALT SERPL W P-5'-P-CCNC: 122 U/L (ref 0–50)
ANION GAP SERPL CALCULATED.3IONS-SCNC: 6 MMOL/L (ref 3–14)
AST SERPL W P-5'-P-CCNC: 55 U/L (ref 0–45)
BACTERIA SPEC CULT: NO GROWTH
BASOPHILS # BLD AUTO: 0.1 10E9/L (ref 0–0.2)
BASOPHILS NFR BLD AUTO: 0.3 %
BILIRUB SERPL-MCNC: 0.2 MG/DL (ref 0.2–1.3)
BUN SERPL-MCNC: 22 MG/DL (ref 7–30)
CALCIUM SERPL-MCNC: 8.9 MG/DL (ref 8.5–10.1)
CHLORIDE SERPL-SCNC: 99 MMOL/L (ref 94–109)
CO2 SERPL-SCNC: 29 MMOL/L (ref 20–32)
CREAT SERPL-MCNC: 0.53 MG/DL (ref 0.52–1.04)
DIFFERENTIAL METHOD BLD: ABNORMAL
EOSINOPHIL # BLD AUTO: 1.3 10E9/L (ref 0–0.7)
EOSINOPHIL NFR BLD AUTO: 6.2 %
ERYTHROCYTE [DISTWIDTH] IN BLOOD BY AUTOMATED COUNT: 15.2 % (ref 10–15)
GFR SERPL CREATININE-BSD FRML MDRD: >90 ML/MIN/{1.73_M2}
GLUCOSE SERPL-MCNC: 207 MG/DL (ref 70–99)
HCT VFR BLD AUTO: 30.5 % (ref 35–47)
HGB BLD-MCNC: 9.4 G/DL (ref 11.7–15.7)
IMM GRANULOCYTES # BLD: 0.2 10E9/L (ref 0–0.4)
IMM GRANULOCYTES NFR BLD: 0.9 %
LYMPHOCYTES # BLD AUTO: 2.1 10E9/L (ref 0.8–5.3)
LYMPHOCYTES NFR BLD AUTO: 10.1 %
Lab: NORMAL
MCH RBC QN AUTO: 28.7 PG (ref 26.5–33)
MCHC RBC AUTO-ENTMCNC: 30.8 G/DL (ref 31.5–36.5)
MCV RBC AUTO: 93 FL (ref 78–100)
MONOCYTES # BLD AUTO: 1.3 10E9/L (ref 0–1.3)
MONOCYTES NFR BLD AUTO: 6.4 %
NEUTROPHILS # BLD AUTO: 15.5 10E9/L (ref 1.6–8.3)
NEUTROPHILS NFR BLD AUTO: 76.1 %
NRBC # BLD AUTO: 0.4 10*3/UL
NRBC BLD AUTO-RTO: 2 /100
PLATELET # BLD AUTO: 1622 10E9/L (ref 150–450)
POTASSIUM SERPL-SCNC: 4.4 MMOL/L (ref 3.4–5.3)
PROT SERPL-MCNC: 7.2 G/DL (ref 6.8–8.8)
RBC # BLD AUTO: 3.28 10E12/L (ref 3.8–5.2)
SODIUM SERPL-SCNC: 134 MMOL/L (ref 133–144)
SPECIMEN SOURCE: NORMAL
WBC # BLD AUTO: 20.4 10E9/L (ref 4–11)

## 2020-08-07 PROCEDURE — 80053 COMPREHEN METABOLIC PANEL: CPT | Performed by: TRANSPLANT SURGERY

## 2020-08-07 PROCEDURE — 96360 HYDRATION IV INFUSION INIT: CPT

## 2020-08-07 PROCEDURE — 36415 COLL VENOUS BLD VENIPUNCTURE: CPT | Performed by: TRANSPLANT SURGERY

## 2020-08-07 PROCEDURE — 85025 COMPLETE CBC W/AUTO DIFF WBC: CPT | Performed by: TRANSPLANT SURGERY

## 2020-08-07 PROCEDURE — 40000556 ZZH STATISTIC PERIPHERAL IV START W US GUIDANCE: Mod: ZF

## 2020-08-07 PROCEDURE — 25800030 ZZH RX IP 258 OP 636: Mod: ZF | Performed by: SURGERY

## 2020-08-07 RX ORDER — SODIUM CHLORIDE, SODIUM LACTATE, POTASSIUM CHLORIDE, CALCIUM CHLORIDE 600; 310; 30; 20 MG/100ML; MG/100ML; MG/100ML; MG/100ML
INJECTION, SOLUTION INTRAVENOUS ONCE
Status: COMPLETED | OUTPATIENT
Start: 2020-08-07 | End: 2020-08-07

## 2020-08-07 RX ADMIN — SODIUM CHLORIDE, POTASSIUM CHLORIDE, SODIUM LACTATE AND CALCIUM CHLORIDE: 600; 310; 30; 20 INJECTION, SOLUTION INTRAVENOUS at 12:52

## 2020-08-07 NOTE — LETTER
"    8/7/2020         RE: Dottie Hernandez  [address]  [facility] [address]        Dear Colleague,    Thank you for referring your patient, Dottie Hernandez, to the [facility]. Please see a copy of my visit note below.    Dottie Hernandez came to [facility] today for a lab and assess following a Auto Islet transplant on 08/07/2020.      Discharge date: 08/06/2020  Transplant coordinator: Gena  Phone number patient can be reached at: [phone] ()      Physical Assessment:  See physical assessment located under \"Document Flowsheets\".  Incision site: stapled, C/D/I  Lines: J/G tube  G was clamped, now vented due to nausea. J has continuous tube feed impact peptid running at 50ml/hr, H2O 35 ml/hr  Armendariz: N/A  Urine clarity: clear yellow per patient  Hydration: NPO, received 1L LR over 1 hour  Nutrition: Impact peptid running at 50ml/hr, H2O 35 ml/hr   Last BM: loose - for C.Diff  Pain: 6/10 Abdominal  area relieved with Dilaudid,tylenol, robaxin and Gabapenten     Labs drawn by [facility] staff No    Plan of care for today: labs and assessment reviewed with Dr. Peña. OK to get 1 liter LR over 1 hour. Labs looking OK    1. Flow sheet reviewed  2. Medications reviewed  3. Will sent BG to Gena daily  4.call coordinator with questions  5. Home care starting Saturday.      Medications administered:  Administrations This Visit     lactated ringers infusion     Admin Date  08/07/2020 Action  New Bag Dose   Rate  999 mL/hr Route  Intravenous Administered By  Miguelina Clements, RN                Patient education:    The following teaching topics were addressed: Incisional care, Signs/symptoms of infection, Good handwashing, Medications (purposes, doses and times of administration), Phone numbers to call with concenrs (Transplant coordinator, Unit [facility] and [facility]), 7A discharge check list, Plan of care, Drain care and J/G tube, tube feed   Patient and Other verbalized " understanding and all questions answered.    Face to face time: 120  Discharge Plan    Pt will follow up with coordinator as instructed  Discharge instructions reviewed with patient: YES  Patient/Representative verbalized understanding, all questions answered: YES    Discharged from unit at 1410 with whom: brother to stefanyel.    Miguelina Dumont, RN, RN     Again, thank you for allowing me to participate in the care of your patient.        Sincerely,        [facility]

## 2020-08-07 NOTE — PROGRESS NOTES
This is a recent snapshot of the patient's [facility] medical record.  For current drug dose and complete information and questions, call [phone] or In Basket pool, fv home infusion ([id number])  CSN Number:  [id number]

## 2020-08-07 NOTE — TELEPHONE ENCOUNTER
Per inpatient diabetes team:    Outpatient diabetes follow up: Care coordinator confirms pt will follow up with Dr. López while staying locally (and diabetes educ), then to establish with endocrinologist near her home in [place] (her GI team will refer her).

## 2020-08-07 NOTE — PROGRESS NOTES
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      Discharge date: 08/06/2020  Transplant coordinator: Gena  Phone number patient can be reached at: [phone] (M)      Physical Assessment:  See physical assessment located under \"Document Flowsheets\".  Incision site: stapled, C/D/I  Lines: J/G tube  G was clamped, now vented due to nausea. J has continuous tube feed impact peptid running at 50ml/hr, H2O 35 ml/hr  Armendariz: N/A  Urine clarity: clear yellow per patient  Hydration: NPO, received 1L LR over 1 hour  Nutrition: Impact peptid running at 50ml/hr, H2O 35 ml/hr   Last BM: loose - for C.Diff  Pain: 6/10 Abdominal  area relieved with Dilaudid,tylenol, robaxin and Gabapenten     Labs drawn by [facility] staff No    Plan of care for today: labs and assessment reviewed with Dr. Peña. OK to get 1 liter LR over 1 hour. Labs looking OK    1. Flow sheet reviewed  2. Medications reviewed  3. Will sent BG to Gena daily  4.call coordinator with questions  5. Home care starting Saturday.      Medications administered:  Administrations This Visit     lactated ringers infusion     Admin Date  08/07/2020 Action  New Bag Dose   Rate  999 mL/hr Route  Intravenous Administered By  Miguelina Clements, RN                Patient education:    The following teaching topics were addressed: Incisional care, Signs/symptoms of infection, Good handwashing, Medications (purposes, doses and times of administration), Phone numbers to call with concenrs (Transplant coordinator, [facility] and [facility]), 7A discharge check list, Plan of care, Drain care and J/G tube, tube feed   Patient and Other verbalized understanding and all questions answered.    Face to face time: 120  Discharge Plan    Pt will follow up with coordinator as instructed  Discharge instructions reviewed with patient: YES  Patient/Representative verbalized understanding, all questions answered: YES    Discharged " from unit at 1410 with whom: brother to hotel.    Miguelina Dumont, RN, RN

## 2020-08-08 ENCOUNTER — HOME INFUSION (PRE-WILLOW HOME INFUSION) (OUTPATIENT)
Dept: PHARMACY | Facility: CLINIC | Age: 55
End: 2020-08-08

## 2020-08-08 DIAGNOSIS — B37.0 ORAL THRUSH: Primary | ICD-10-CM

## 2020-08-08 RX ORDER — FLUCONAZOLE 200 MG/1
TABLET ORAL
Qty: 15 TABLET | Refills: 0 | Status: SHIPPED | OUTPATIENT
Start: 2020-08-08 | End: 2020-09-03

## 2020-08-10 ENCOUNTER — HOME INFUSION (PRE-WILLOW HOME INFUSION) (OUTPATIENT)
Dept: PHARMACY | Facility: CLINIC | Age: 55
End: 2020-08-10

## 2020-08-10 DIAGNOSIS — Z90.410 POST-PANCREATECTOMY DIABETES (H): Primary | ICD-10-CM

## 2020-08-10 DIAGNOSIS — E13.9 POST-PANCREATECTOMY DIABETES (H): Primary | ICD-10-CM

## 2020-08-10 DIAGNOSIS — Z98.890 POST-OPERATIVE STATE: Primary | ICD-10-CM

## 2020-08-10 DIAGNOSIS — E89.1 POST-PANCREATECTOMY DIABETES (H): Primary | ICD-10-CM

## 2020-08-10 LAB
Lab: NORMAL
Lab: NORMAL
SPECIMEN SOURCE: NORMAL
SPECIMEN SOURCE: NORMAL
YEAST SPEC QL CULT: NO GROWTH
YEAST SPEC QL CULT: NO GROWTH

## 2020-08-10 NOTE — PROGRESS NOTES
This is a recent snapshot of the patient's [facility] medical record.  For current drug dose and complete information and questions, call [phone] or In Basket pool, fv home infusion ([id number])  CSN Number:  [id number]

## 2020-08-11 NOTE — PROGRESS NOTES
This is a recent snapshot of the patient's [facility] medical record.  For current drug dose and complete information and questions, call [phone] or In Basket pool, fv home infusion ([id number])  CSN Number:  [id number]

## 2020-08-12 ENCOUNTER — TELEPHONE (OUTPATIENT)
Dept: TRANSPLANT | Facility: CLINIC | Age: 55
End: 2020-08-12

## 2020-08-12 NOTE — TELEPHONE ENCOUNTER
"Spoke to Shaina today. She is clamping her G tube all day with no current issues. Bowels \" starting to kick in \" She will leave her G tube clamped overnight if she feels 100 % confident but I told her not to rush anything.She is aware of her appointments for tomorrow.  "

## 2020-08-13 ENCOUNTER — OFFICE VISIT (OUTPATIENT)
Dept: TRANSPLANT | Facility: CLINIC | Age: 55
End: 2020-08-13
Attending: TRANSPLANT SURGERY
Payer: COMMERCIAL

## 2020-08-13 VITALS
BODY MASS INDEX: 23.5 KG/M2 | OXYGEN SATURATION: 97 % | WEIGHT: 141.2 LBS | SYSTOLIC BLOOD PRESSURE: 111 MMHG | DIASTOLIC BLOOD PRESSURE: 76 MMHG | HEART RATE: 105 BPM

## 2020-08-13 DIAGNOSIS — E89.1 POST-PANCREATECTOMY DIABETES (H): ICD-10-CM

## 2020-08-13 DIAGNOSIS — E13.9 POST-PANCREATECTOMY DIABETES (H): ICD-10-CM

## 2020-08-13 DIAGNOSIS — Z90.410 ACQUIRED TOTAL ABSENCE OF PANCREAS: ICD-10-CM

## 2020-08-13 DIAGNOSIS — Z98.890 POST-OPERATIVE STATE: ICD-10-CM

## 2020-08-13 DIAGNOSIS — Z90.410 POST-PANCREATECTOMY DIABETES (H): ICD-10-CM

## 2020-08-13 LAB
ALBUMIN SERPL-MCNC: 2.9 G/DL (ref 3.4–5)
ALP SERPL-CCNC: 199 U/L (ref 40–150)
ALT SERPL W P-5'-P-CCNC: 47 U/L (ref 0–50)
ANION GAP SERPL CALCULATED.3IONS-SCNC: 6 MMOL/L (ref 3–14)
AST SERPL W P-5'-P-CCNC: 27 U/L (ref 0–45)
BASOPHILS # BLD AUTO: 0.2 10E9/L (ref 0–0.2)
BASOPHILS NFR BLD AUTO: 1.2 %
BILIRUB SERPL-MCNC: 0.2 MG/DL (ref 0.2–1.3)
BUN SERPL-MCNC: 20 MG/DL (ref 7–30)
CALCIUM SERPL-MCNC: 9 MG/DL (ref 8.5–10.1)
CHLORIDE SERPL-SCNC: 104 MMOL/L (ref 94–109)
CO2 SERPL-SCNC: 29 MMOL/L (ref 20–32)
CREAT SERPL-MCNC: 0.49 MG/DL (ref 0.52–1.04)
DIFFERENTIAL METHOD BLD: ABNORMAL
EOSINOPHIL # BLD AUTO: 2.1 10E9/L (ref 0–0.7)
EOSINOPHIL NFR BLD AUTO: 15.4 %
ERYTHROCYTE [DISTWIDTH] IN BLOOD BY AUTOMATED COUNT: 15.4 % (ref 10–15)
GFR SERPL CREATININE-BSD FRML MDRD: >90 ML/MIN/{1.73_M2}
GLUCOSE SERPL-MCNC: 103 MG/DL (ref 70–99)
HCT VFR BLD AUTO: 29.1 % (ref 35–47)
HGB BLD-MCNC: 8.9 G/DL (ref 11.7–15.7)
IMM GRANULOCYTES # BLD: 0.1 10E9/L (ref 0–0.4)
IMM GRANULOCYTES NFR BLD: 0.6 %
LYMPHOCYTES # BLD AUTO: 3.1 10E9/L (ref 0.8–5.3)
LYMPHOCYTES NFR BLD AUTO: 22.8 %
MCH RBC QN AUTO: 28 PG (ref 26.5–33)
MCHC RBC AUTO-ENTMCNC: 30.6 G/DL (ref 31.5–36.5)
MCV RBC AUTO: 92 FL (ref 78–100)
MONOCYTES # BLD AUTO: 1.1 10E9/L (ref 0–1.3)
MONOCYTES NFR BLD AUTO: 7.8 %
NEUTROPHILS # BLD AUTO: 7.2 10E9/L (ref 1.6–8.3)
NEUTROPHILS NFR BLD AUTO: 52.2 %
NRBC # BLD AUTO: 0.2 10*3/UL
NRBC BLD AUTO-RTO: 1 /100
PLATELET # BLD AUTO: 1902 10E9/L (ref 150–450)
PLATELET # BLD EST: ABNORMAL 10*3/UL
POTASSIUM SERPL-SCNC: 4.2 MMOL/L (ref 3.4–5.3)
PROT SERPL-MCNC: 7.4 G/DL (ref 6.8–8.8)
RBC # BLD AUTO: 3.18 10E12/L (ref 3.8–5.2)
SODIUM SERPL-SCNC: 138 MMOL/L (ref 133–144)
WBC # BLD AUTO: 13.7 10E9/L (ref 4–11)

## 2020-08-13 PROCEDURE — 36415 COLL VENOUS BLD VENIPUNCTURE: CPT | Performed by: TRANSPLANT SURGERY

## 2020-08-13 PROCEDURE — 80053 COMPREHEN METABOLIC PANEL: CPT | Performed by: TRANSPLANT SURGERY

## 2020-08-13 PROCEDURE — 85025 COMPLETE CBC W/AUTO DIFF WBC: CPT | Performed by: TRANSPLANT SURGERY

## 2020-08-13 RX ORDER — HYDROMORPHONE HYDROCHLORIDE 1 MG/ML
4-6 SOLUTION ORAL EVERY 4 HOURS
Qty: 360 ML | Refills: 0 | Status: SHIPPED | OUTPATIENT
Start: 2020-08-13 | End: 2020-09-03

## 2020-08-13 NOTE — LETTER
2020         RE: Dottie Hernandez  [address]  [facility] [address]        Dear Colleague,    Thank you for referring your patient, Dottie Hernandez, to the [facility]. Please see a copy of my visit note below.    Chronic Pancreatitis Group Progress Note    I had the pleasure of seeing Ms. Dottie Hernandez today. She is 17 days status post total pancreatectomy with islet autotransplant.    Interval events since last visit with me:   ER visits = 0, reasons: NA  Inpatient admissions = 0, reasons NA  The patient reports their current symptoms to be:   GI function:   Nausea:   []  none    [x]  rare    []  often    []  daily  Comment: appears to be with pills  Vomiting: []  none    [x]  rare    []  often    []  daily   Comment: vomited today  Last BM: Today.  Stools are soft, frequency : 1 per day .   Appetite: fair  Tube feeds at per protocol cc/hr, per protocol hr cycle.  Oral food intake: very little to none per day  Pancreatic exocrine insufficiency:   Takes (relizorb), On TF   Greasy stools: [x]  none   []  rarely    []  several times/wk   []  daily      Comment:   Diabetes management:   Long acting insulin: Lantus-- units/day  Short acting insulin: Novolog--see /Dr López note and  units/day  Blood sugars typically range from 56 to 140.   Lab Results   Component Value Date    A1C 6.0 2020      Pain management:   Pain rating (0=no pain, 10=unbearable): 4  Long acting agent: Hydromorphone 4 mg every 4 hours.  Short acting agent: none mg .  Daily 'Uptime':   Walking: distance ,  times per week  Prescription Medications as of 2020       Rx Number Disp Refills Start End Last Dispensed Date Next Fill Date Owning Pharmacy    acetaminophen *SUGAR FREE* (TYLENOL) 32 mg/mL solution  1136.8 mL 0 2020   [facility] - [place], [place] - [address]    Si.3 mLs (650 mg) by Per J Tube route every 6 hours for 14 days    Class:  E-Prescribe    Route: Per J Tube    Alcohol Swabs PADS  100 each 0 2020    [address]    Sig: Use to swab the area of the injection or erik as directed    Class: E-Prescribe    aspirin (ASA) 325 MG tablet  30 tablet 11 2020    [address]    Sig: Take 1 tablet (325 mg) by mouth daily    Class: E-Prescribe    Route: Oral    blood glucose (NO BRAND SPECIFIED) lancets standard  100 each 3 2020    [address]    Sig: Use to test blood sugar 6-8 times daily as directed.    Class: E-Prescribe    Notes to Pharmacy: Pt has Valeriy Contour Next USB meter    blood glucose (NO BRAND SPECIFIED) test strip  100 each 0 2020    [address]    Sig: Use to test blood sugar 6-8 times daily as directed.    Class: E-Prescribe    Notes to Pharmacy: Pt has Valeriy Contour Next USB meter    docusate (COLACE) 50 MG/5ML liquid  300 mL 1 2020    [address]    Si mLs (50 mg) by Per J Tube route 2 times daily as needed for constipation    Class: E-Prescribe    Route: Per J Tube    fluconazole (DIFLUCAN) 200 MG tablet  15 tablet 0 2020    [address]    Sig: Take 400 mg for first dose, then 20 mg daily for 2 weeks    Class: E-Prescribe    fluticasone (FLONASE) 50 MCG/ACT nasal spray    2019        Sig: Spray 1 spray into both nostrils 2 times daily     Class: Historical    Route: Both Nostrils    gabapentin (NEURONTIN) 250 MG/5ML solution  540 mL 11 2020    [address]    Si mLs (300 mg) by Oral or J tube route every 8 hours    Class: E-Prescribe    Route: Oral or J tube    Glucagon 3 MG/DOSE POWD  3  each 0 8/6/2020    [address]    Sig: [place] 1 each in nostril as needed (For hypoglycemia)    Class: E-Prescribe    Route: Nasal    glucose 40 % (400 mg/mL) gel  3 Tube 0 8/6/2020    [address]    Sig: 15 g every 15 minutes by mouth as needed for low blood sugar. Oral gel is preferable for conscious and able to swallow patient.    Class: E-Prescribe    HYDROmorphone, STANDARD CONC, (DILAUDID) 1 MG/ML oral solution  360 mL 0 8/6/2020    [address]    Sig: Take 4-6 mLs (4-6 mg) by mouth every 4 hours    Class: E-Prescribe    Earliest Fill Date: 8/6/2020    Route: Oral    insulin aspart (NOVOLOG PEN) 100 UNIT/ML pen  9 mL 0 8/6/2020    [address]    Sig: Do Not give Correction Insulin if BG less than 121. -140 = 1 unit -160 = 2 units -180 = 3 units -200 = 4 units -220 = 5 units -240 = 6 units -260 = 7 units -280 = 8 units -300 = 9 units BG >300 = 10 units    Class: Local Print    insulin glargine (LANTUS PEN) 100 UNIT/ML pen  9 mL 0 8/6/2020    [address]    Sig: Inject 25 Units Subcutaneous every evening    Class: E-Prescribe    Notes to Pharmacy: If Lantus is not covered by insurance, may substitute Basaglar at same dose and frequency.      Route: Subcutaneous    insulin glargine (LANTUS PEN) 100 UNIT/ML pen  9 mL 0 8/7/2020    [address]    Sig: Inject 30 Units Subcutaneous every morning    Class: E-Prescribe    Notes to Pharmacy: If Lantus is not covered by insurance, may substitute Basaglar at same dose and frequency.      Route: Subcutaneous    insulin pen needle (32G X 4 MM) 32G X 4 MM miscellaneous  100  each 3 8/6/2020    [address]    Sig: Use for insulin administration 6-8 times daily    Class: E-Prescribe    Lidocaine (LIDOCARE) 4 % Patch  5 patch 0 8/6/2020    [address]    Sig: Place 1 patch onto the skin every 24 hours To prevent lidocaine toxicity, patient should be patch free for 12 hrs daily.    Class: E-Prescribe    Route: Transdermal    LORazepam (ATIVAN) 2 MG/ML (HIGH CONC) solution  8 mL 0 8/6/2020    [address]    Sig: Take 0.25 mLs (0.5 mg) by mouth every 12 hours as needed for anxiety or nausea    Class: E-Prescribe    Route: Oral    methocarbamol (ROBAXIN) 500 MG tablet  30 tablet 0 8/6/2020    [address]    Sig: Take 1 tablet (500 mg) by mouth At Bedtime    Class: E-Prescribe    Route: Oral    metoclopramide (REGLAN) 5 MG/5ML solution  560 mL 0 8/6/2020    [address]    Sig: 10 mLs (10 mg) by Per J Tube route 4 times daily (before meals and nightly)    Class: E-Prescribe    Route: Per J Tube    multivitamins w/minerals (CERTAVITE) liquid  210 mL 0 8/6/2020    [address]    Sig: 15 mLs by Oral or Feeding Tube route daily    Class: E-Prescribe    Route: Oral or Feeding Tube    ondansetron (ZOFRAN-ODT) 4 MG ODT tab  40 tablet 0 8/6/2020    [address]    Sig: Take 1 tablet (4 mg) by mouth every 6 hours as needed for nausea or vomiting    Class: E-Prescribe    Route: Oral    ondansetron (ZOFRAN-ODT) 4 MG ODT tab    1/7/2019        Sig: Take 4 mg by mouth every 8 hours as needed     Class: Historical    Route: Oral    pantoprazole (PROTONIX) 2 mg/mL SUSP suspension  300 mL 0 8/7/2020     [address]    Si mLs (40 mg) by Per Feeding Tube route daily    Class: E-Prescribe    Route: Per Feeding Tube    sennosides (SENOKOT) 8.8 MG/5ML syrup  150 mL 0 2020    [address]    Sig: Take 5 mLs by mouth 2 times daily    Class: E-Prescribe    Route: Oral    Sharps Container MISC  1 each 0 2020    [address]    Sig: Use as directed to dispose of needles, lancets and other sharps    Class: E-Prescribe    amoxicillin-clavulanate (AUGMENTIN) 400-57 MG/5ML suspension (Ended)  109 mL 0 2020   [address]    Sig: 10.9 mLs (875 mg) by Per J Tube route 2 times daily for 5 days    Class: E-Prescribe    Route: Per J Tube        Physical exam:   General Appearance: in no apparent distress.       Skin: Normal, no rashes or jaundice  Heart: Regular rhythm.  Lungs: no audible wheezes or increased work of breathing.  Abdomen: The abdomen is flat, and the wound is Healing well, without hernia.  Tube exit site is clean. The abdomen is non-tender, .    Edema: absent.    Chronic Pancreatitis Latest Ref Rng & Units 2020   Weight - - 63 kg - - -   AMYLASE 30 - 110 U/L - - - - -   LIPASE 73 - 393 U/L - - - - -   HEMOGLOBIN A1C 0 - 5.6 % - - - - -   C PEPTIDE 0.9 - 6.9 ng/mL - - - - -   GLUCOSE 70 - 99 mg/dL 160(H) - 162(H) 207(H) -   HGB 11.7 - 15.7 g/dL 8.3(L) - 8.9(L) 9.4(L) 8.9(L)    - 450 10e9/L 980(H) - 1,300(HH) 1,622(HH) 1,902(HH)   ALBUMIN 3.4 - 5.0 g/dL - - - 3.0(L) -   PREALBUMIN 15 - 45 mg/dL - - - - -       Assessment and Plan: She is doing fairly well s/p TP-IAT.  Interval progress has been good.  Postoperative gastric ileus:   Pancreatic exocrine insufficiency: NA  Malnutrition: none  Diabetes mellitus: See   Maribel note  Abdominal pain management: Improving abd pain.  Followup: She will see Dr Jarivs in clinic next weeks.      Cherelle Loving CNP   Total time: 20 min, Counselling Time: 15 min.  .      I saw Ms Hernandez with Ms Cruz and TAYLOR Chopra.  Her ekta-TPIAT issues were addressed re: pain, insulin, activity and wound check.  Overall doing pretty well. To see Matheus next week      Again, thank you for allowing me to participate in the care of your patient.        Sincerely,        Emmanuel Kirby MD

## 2020-08-13 NOTE — PROGRESS NOTES
Chronic Pancreatitis Group Progress Note    I had the pleasure of seeing Ms. Dottie Hernandez today. She is 17 days status post total pancreatectomy with islet autotransplant.    Interval events since last visit with me:   ER visits = 0, reasons: NA  Inpatient admissions = 0, reasons NA  The patient reports their current symptoms to be:   GI function:   Nausea:   []  none    [x]  rare    []  often    []  daily  Comment: appears to be with pills  Vomiting: []  none    [x]  rare    []  often    []  daily   Comment: vomited today  Last BM: Today.  Stools are soft, frequency : 1 per day .   Appetite: fair  Tube feeds at per protocol cc/hr, per protocol hr cycle.  Oral food intake: very little to none per day  Pancreatic exocrine insufficiency:   Takes (relizorb), On TF   Greasy stools: [x]  none   []  rarely    []  several times/wk   []  daily      Comment:   Diabetes management:   Long acting insulin: Lantus-- units/day  Short acting insulin: Novolog--see /Dr López note and  units/day  Blood sugars typically range from 56 to 140.   Lab Results   Component Value Date    A1C 6.0 2020      Pain management:   Pain rating (0=no pain, 10=unbearable): 4  Long acting agent: Hydromorphone 4 mg every 4 hours.  Short acting agent: none mg .  Daily 'Uptime':   Walking: distance ,  times per week  Prescription Medications as of 2020       Rx Number Disp Refills Start End Last Dispensed Date Next Fill Date Owning Pharmacy    acetaminophen *SUGAR FREE* (TYLENOL) 32 mg/mL solution  1136.8 mL 0 2020   [address]    Si.3 mLs (650 mg) by Per J Tube route every 6 hours for 14 days    Class: E-Prescribe    Route: Per J Tube    Alcohol Swabs PADS  100 each 0 2020    [address]    Sig: Use to swab the area of the injection or erik as directed    Class: E-Prescribe    aspirin (ASA) 325 MG  tablet  30 tablet 11 2020    [address]    Sig: Take 1 tablet (325 mg) by mouth daily    Class: E-Prescribe    Route: Oral    blood glucose (NO BRAND SPECIFIED) lancets standard  100 each 3 2020    [address]    Sig: Use to test blood sugar 6-8 times daily as directed.    Class: E-Prescribe    Notes to Pharmacy: Pt has Valeriy Contour Next USB meter    blood glucose (NO BRAND SPECIFIED) test strip  100 each 0 2020    [address]    Sig: Use to test blood sugar 6-8 times daily as directed.    Class: E-Prescribe    Notes to Pharmacy: Pt has Valeriy Contour Next USB meter    docusate (COLACE) 50 MG/5ML liquid  300 mL 1 2020    [address]    Si mLs (50 mg) by Per J Tube route 2 times daily as needed for constipation    Class: E-Prescribe    Route: Per J Tube    fluconazole (DIFLUCAN) 200 MG tablet  15 tablet 0 2020    [address]    Sig: Take 400 mg for first dose, then 20 mg daily for 2 weeks    Class: E-Prescribe    fluticasone (FLONASE) 50 MCG/ACT nasal spray    2019        Sig: Spray 1 spray into both nostrils 2 times daily     Class: Historical    Route: Both Nostrils    gabapentin (NEURONTIN) 250 MG/5ML solution  540 mL 11 2020    [address]    Si mLs (300 mg) by Oral or J tube route every 8 hours    Class: E-Prescribe    Route: Oral or J tube    Glucagon 3 MG/DOSE POWD  3 each 0 2020    [address]    Sig: [place] 1 each in nostril as needed (For hypoglycemia)    Class: E-Prescribe    Route: Nasal    glucose 40 % (400 mg/mL) gel  3 Tube 0 2020    [facility]  [address]    Sig: 15 g every 15 minutes by mouth as needed for low blood sugar. Oral gel is preferable for conscious and able to swallow patient.    Class: E-Prescribe    HYDROmorphone, STANDARD CONC, (DILAUDID) 1 MG/ML oral solution  360 mL 0 8/6/2020    [address]    Sig: Take 4-6 mLs (4-6 mg) by mouth every 4 hours    Class: E-Prescribe    Earliest Fill Date: 8/6/2020    Route: Oral    insulin aspart (NOVOLOG PEN) 100 UNIT/ML pen  9 mL 0 8/6/2020    [address]    Sig: Do Not give Correction Insulin if BG less than 121. -140 = 1 unit -160 = 2 units -180 = 3 units -200 = 4 units -220 = 5 units -240 = 6 units -260 = 7 units -280 = 8 units -300 = 9 units BG >300 = 10 units    Class: Local Print    insulin glargine (LANTUS PEN) 100 UNIT/ML pen  9 mL 0 8/6/2020    [address]    Sig: Inject 25 Units Subcutaneous every evening    Class: E-Prescribe    Notes to Pharmacy: If Lantus is not covered by insurance, may substitute Basaglar at same dose and frequency.      Route: Subcutaneous    insulin glargine (LANTUS PEN) 100 UNIT/ML pen  9 mL 0 8/7/2020    [address]    Sig: Inject 30 Units Subcutaneous every morning    Class: E-Prescribe    Notes to Pharmacy: If Lantus is not covered by insurance, may substitute Basaglar at same dose and frequency.      Route: Subcutaneous    insulin pen needle (32G X 4 MM) 32G X 4 MM miscellaneous  100 each 3 8/6/2020    [address]    Sig: Use for insulin administration 6-8 times daily    Class: E-Prescribe    Lidocaine (LIDOCARE) 4 % Patch  5 patch 0 8/6/2020    [facility]  [address]    Sig: Place 1 patch onto the skin every 24 hours To prevent lidocaine toxicity, patient should be patch free for 12 hrs daily.    Class: E-Prescribe    Route: Transdermal    LORazepam (ATIVAN) 2 MG/ML (HIGH CONC) solution  8 mL 0 2020    [address]    Sig: Take 0.25 mLs (0.5 mg) by mouth every 12 hours as needed for anxiety or nausea    Class: E-Prescribe    Route: Oral    methocarbamol (ROBAXIN) 500 MG tablet  30 tablet 0 2020    [address]    Sig: Take 1 tablet (500 mg) by mouth At Bedtime    Class: E-Prescribe    Route: Oral    metoclopramide (REGLAN) 5 MG/5ML solution  560 mL 0 2020    [address]    Sig: 10 mLs (10 mg) by Per J Tube route 4 times daily (before meals and nightly)    Class: E-Prescribe    Route: Per J Tube    multivitamins w/minerals (CERTAVITE) liquid  210 mL 0 2020    [address]    Sig: 15 mLs by Oral or Feeding Tube route daily    Class: E-Prescribe    Route: Oral or Feeding Tube    ondansetron (ZOFRAN-ODT) 4 MG ODT tab  40 tablet 0 2020    [address]    Sig: Take 1 tablet (4 mg) by mouth every 6 hours as needed for nausea or vomiting    Class: E-Prescribe    Route: Oral    ondansetron (ZOFRAN-ODT) 4 MG ODT tab    2019        Sig: Take 4 mg by mouth every 8 hours as needed     Class: Historical    Route: Oral    pantoprazole (PROTONIX) 2 mg/mL SUSP suspension  300 mL 0 2020    [address]    Si mLs (40 mg) by Per Feeding Tube route daily    Class: E-Prescribe    Route: Per Feeding Tube    sennosides (SENOKOT) 8.8 MG/5ML syrup  150 mL 0 2020    [facility]  Discharge - [address]    Sig: Take 5 mLs by mouth 2 times daily    Class: E-Prescribe    Route: Oral    Sharps Container MISC  1 each 0 8/6/2020    [address]    Sig: Use as directed to dispose of needles, lancets and other sharps    Class: E-Prescribe    amoxicillin-clavulanate (AUGMENTIN) 400-57 MG/5ML suspension (Ended)  109 mL 0 8/6/2020 8/11/2020   [facility] [address]    Sig: 10.9 mLs (875 mg) by Per J Tube route 2 times daily for 5 days    Class: E-Prescribe    Route: Per J Tube        Physical exam:   General Appearance: in no apparent distress.       Skin: Normal, no rashes or jaundice  Heart: Regular rhythm.  Lungs: no audible wheezes or increased work of breathing.  Abdomen: The abdomen is flat, and the wound is Healing well, without hernia.  Tube exit site is clean. The abdomen is non-tender, .    Edema: absent.    Chronic Pancreatitis Latest Ref Rng & Units 8/5/2020 8/6/2020 8/6/2020 8/7/2020 8/13/2020   Weight - - 63 kg - - -   AMYLASE 30 - 110 U/L - - - - -   LIPASE 73 - 393 U/L - - - - -   HEMOGLOBIN A1C 0 - 5.6 % - - - - -   C PEPTIDE 0.9 - 6.9 ng/mL - - - - -   GLUCOSE 70 - 99 mg/dL 160(H) - 162(H) 207(H) -   HGB 11.7 - 15.7 g/dL 8.3(L) - 8.9(L) 9.4(L) 8.9(L)    - 450 10e9/L 980(H) - 1,300(HH) 1,622(HH) 1,902(HH)   ALBUMIN 3.4 - 5.0 g/dL - - - 3.0(L) -   PREALBUMIN 15 - 45 mg/dL - - - - -       Assessment and Plan: She is doing fairly well s/p TP-IAT.  Interval progress has been good.  Postoperative gastric ileus:   Pancreatic exocrine insufficiency: NA  Malnutrition: none  Diabetes mellitus: See Dr López note  Abdominal pain management: Improving abd pain.  Followup: She will see Dr Jarvis in clinic next weeks.      Cherelle Loving CNP   Total time: 20 min, Counselling Time: 15 min.  .      I saw Ms Hernandez with Ms Cruz and TAYLOR Chopra.  Her ekta-TPIAT  issues were addressed re: pain, insulin, activity and wound check.  Overall doing pretty well. To see Matheus next week

## 2020-08-14 ENCOUNTER — HOME INFUSION (PRE-WILLOW HOME INFUSION) (OUTPATIENT)
Dept: PHARMACY | Facility: CLINIC | Age: 55
End: 2020-08-14

## 2020-08-15 ENCOUNTER — HOME INFUSION (PRE-WILLOW HOME INFUSION) (OUTPATIENT)
Dept: PHARMACY | Facility: CLINIC | Age: 55
End: 2020-08-15

## 2020-08-17 NOTE — PROGRESS NOTES
This is a recent snapshot of the patient's [facility] medical record.  For current drug dose and complete information and questions, call [phone] or In Basket pool, fv home infusion ([id number])  CSN Number:  [id number]

## 2020-08-17 NOTE — PROGRESS NOTES
This is a recent snapshot of the patient's [facility] medical record.  For current drug dose and complete information and questions, call [phone] or In Basket pool, fv home infusion ([id number])  CSN Number:  [id number]

## 2020-08-19 ENCOUNTER — HOME INFUSION (PRE-WILLOW HOME INFUSION) (OUTPATIENT)
Dept: PHARMACY | Facility: CLINIC | Age: 55
End: 2020-08-19

## 2020-08-19 ENCOUNTER — NURSE TRIAGE (OUTPATIENT)
Dept: NURSING | Facility: CLINIC | Age: 55
End: 2020-08-19

## 2020-08-19 ENCOUNTER — OFFICE VISIT (OUTPATIENT)
Dept: TRANSPLANT | Facility: CLINIC | Age: 55
End: 2020-08-19
Attending: PEDIATRICS
Payer: COMMERCIAL

## 2020-08-19 VITALS
DIASTOLIC BLOOD PRESSURE: 80 MMHG | OXYGEN SATURATION: 92 % | BODY MASS INDEX: 23.38 KG/M2 | WEIGHT: 140.5 LBS | SYSTOLIC BLOOD PRESSURE: 121 MMHG | HEART RATE: 108 BPM

## 2020-08-19 DIAGNOSIS — Z90.410 POST-PANCREATECTOMY DIABETES (H): Primary | ICD-10-CM

## 2020-08-19 DIAGNOSIS — E13.9 POST-PANCREATECTOMY DIABETES (H): Primary | ICD-10-CM

## 2020-08-19 DIAGNOSIS — E89.1 POST-PANCREATECTOMY DIABETES (H): Primary | ICD-10-CM

## 2020-08-19 PROCEDURE — G0463 HOSPITAL OUTPT CLINIC VISIT: HCPCS | Mod: ZF

## 2020-08-19 RX ORDER — PROCHLORPERAZINE 25 MG/1
SUPPOSITORY RECTAL
Qty: 1 EACH | Refills: 0 | Status: SHIPPED | OUTPATIENT
Start: 2020-08-19 | End: 2020-09-02

## 2020-08-19 RX ORDER — PROCHLORPERAZINE 25 MG/1
SUPPOSITORY RECTAL
Qty: 3 EACH | Refills: 11 | Status: SHIPPED | OUTPATIENT
Start: 2020-08-19 | End: 2020-09-02

## 2020-08-19 RX ORDER — PROCHLORPERAZINE 25 MG/1
SUPPOSITORY RECTAL
Qty: 1 EACH | Refills: 3 | Status: SHIPPED | OUTPATIENT
Start: 2020-08-19 | End: 2020-09-02

## 2020-08-19 ASSESSMENT — PAIN SCALES - GENERAL: PAINLEVEL: MILD PAIN (3)

## 2020-08-19 NOTE — LETTER
8/19/2020                [address] [facility] [id number]      Chief Complaint   Patient presents with     RECHECK     F/U TP-IAT     Blood pressure 121/80, pulse 108, weight 63.7 kg (140 lb 8 oz), SpO2 92 %, not currently breastfeeding.    Rosi Haines [facility]  Islet Autotransplant, Diabetes Follow Up    Problem List:  Patient Active Problem List   Diagnosis     Chronic pancreatitis (H)     Pancreatitis     Abnormal semen exam     Abscess of abdominal cavity (H)     Allergic rhinitis     Chronic depression     Chronic pain     Complications of medical care     Diarrhea     Excessive daytime sleepiness     Fluid collection at surgical site     Hypersomnia     Indigestion     Irritable bowel syndrome     Migraine headache     Neoplasm of uncertain behavior of skin     Pain of foot     Pancreas divisum     Abdominal pain, acute     Sleep apnea     Snoring     Systemic inflammatory response syndrome (SIRS) due to noninfectious process (H)     Vitamin D deficiency     Recurrent pancreatitis     Acquired total absence of pancreas     Acute post-operative pain     Post-pancreatectomy diabetes (H)     H/O splenectomy       HPI:  Dottie is a 55 year old female here for follow up of   Laparoscopic hand-assisted total pancreatectomy (complicated by three prior pancreatic surgeries - peustow and open sphincterotomy), Splenectomy, Retrocollic duodenojejunostomy  Antecolic choledochojejunostomy over 8 Fr pediatric FT stent  Gastrojejunostomy placement, Liver biopsy performed on 7/27/2020.  At the time of the procedure, the patient received 53,900 IEQ, or 854 IEQ/kg body weight.    At today's visit, Shaina is on 24 hour tube feeds, clamping tube but gets nauseated after 12 hours or so and unclamps for 5-10 minutes.  We have made frequent insulin dose adjustments.  She had very symptomatic hypoglycemia to 50s the other day and is now quite nervous about hypoglycemia.  We  discussed some guidelines on what is 'out of target' and needs to be treated vs what is dangerous hypoglycemia. Very interested in dexcom G6 due to need for frequent insulin dosing and frequent monitoring, as well as due to glycemic variability and fear of hypoglycemia.     Diabetes history:  Current insulin regimen:  Lantus 27 units AM, 20 units PM  Novolog 1 unit per 20 mg/dL>120 mg/dL   -- not yet eating but will plan for 1 unit per 10 grams  Uses about 4-5 injections per typical day.     Patient is good candidate for CGM therapy.  Meets these criteria:  -- Has a diagnosis of diabetes,: This patient has type 1 diabetes secondary to pancreatectomy (surgical type 1)    --  Uses a home blood glucose monitor (BGM) and conduct four or more daily BGM tests, or is on CGM therapy:  Testing BG 6 times per day plus additional for hypoglycemia, which has resulted in about 8 to 9 tests needed per typical day.  --- Treated with insulin with multiple daily injections or a continuous subcutaneous infusion (CSI) pump:  This patient is on MDI  --  Require frequent adjustments of the insulin treatment regimen, based on therapeutic CGM test results:  YES- we are needing frequent adjustments, almost daily.     Recent hemoglobin A1c levels:  Lab Results   Component Value Date    A1C 6.0 07/23/2020     Hypoglycemia history:  Yes, mild to moderate.  The patient has had 0 episodes of severe hypoglycemia (seizure, coma, or neuroglycopenic symptoms severe enough to require assistance from another person).  Blood sugars were reviewed from the patient records and/or the meter download.    Reviewed from logs, range 55- 200s.        Review of systems:  A complete Review Of Systems was performed and was negative except for nausea, post surgical pain and history above.      Past Medical and Surgical History:  Past Medical History:   Diagnosis Date     Allergies      Chronic pancreatitis (H)      Pancreatitis 2000    lst episode during pregnancy      Recurrent acute pancreatitis      Past Surgical History:   Procedure Laterality Date     APPENDECTOMY       BIOPSY LIVER N/A 7/27/2020    Procedure: Biopsy liver;  Surgeon: Steve Jarvis MD;  Location: [facility] OR     LAPAROSCOPIC PANCREATECTOMY, TRANSPLANT AUTO ISLET CELL N/A 7/27/2020    Procedure: Laproscopipc PANCREATECTOMY, TOTAL, WITH AUTOLOGOUS PANCREATIC ISLET CELL TRANSPLANT, Splenectomy, gastrojejunostomy placement;  Surgeon: Steve Jarvis MD;  Location: [facility] OR     LAPAROTOMY EXPLORATORY      approximately 2002, during pregnancy for pain     open sphincteroplasty/sphincterotomy  04/2011     PANCREATECTOMY PEUSTOW  07/2011     PANCREATECTOMY PEUSTOW  01/2019    revision       Family History:  New changes since last visit:  none  Family History   Problem Relation Age of Onset     Lymphoma Mother         non hodgkins lymphoma     Coronary Artery Disease Mother      Coronary Artery Disease Father      Diabetes No family hx of      Pancreatitis No family hx of      Anesthesia Reaction No family hx of      Deep Vein Thrombosis (DVT) No family hx of        Social History:  Social History     Social History Narrative    Dottie lives in [place] with her family and 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     Here with son.     Physical Exam:  Vitals: /80   Pulse 108   Wt 63.7 kg (140 lb 8 oz)   SpO2 92%   BMI 23.38 kg/m    BMI= Body mass index is 23.38 kg/m .  General:  Well-appearing, NAD  Injection sites:  Intact without lipohypertrophy  Psych:  Communicative, with normal affect         Assessment:  1.  Post pancreatectomy diabetes mellitus, s/p total pancreatectomy and islet autotransplant.  (ICD-10 E89.1)  2.  Type 1 diabetes secondary to pancreatectomy, as outlined in #1 above (Surgical type 1 DM, ICD-10 E10.9)    Dottie is a 55 year old with history of chronic pancreatitis who is s/p total pancreatectomy and islet autotransplant.  She had a low islet mass secondary to  prior drainage surgeries and long disease duration, as we expected.  She will need ongoing insulin therapy.  She is an excellent CGM candidate.     Plan:  Patient Instructions   1)  Continue on the 27 units AM, 20 units PM of Lantus.    2)  No changes to correction scale: 1 unit per 20 mg/dL >120 mg/dL    3)  Not eating yet, discussed moving towards clamping tube for 24 hours before starting PO.  She is going to try to push out to 18 hours (now at 12 hour intervals of clamping).    4)  When starts eating, anticipate carb ratio of 10 grams based on current insulin needs.    5)  Excellent candidate for Dexcom G6 based on MDI, needing 6 or more glucose checks per day, variable BG from 50s to 200s.  However, we did discuss that she will get A LOT more information (288 readings per day) so also not to get overwhelmed or anxious by this info. She will see more highs with a Dexcom vs meter due to more information.         Contact me for questions at [phone] or [phone].  Emergency number to reach pediatric endocrinology after hours is [phone].        Enedina López MD  Associate  Professor, Pediatric Endocrinology and Diabetes  [facility] [place]  [facility]        Enedina López MD

## 2020-08-19 NOTE — PROGRESS NOTES
Chief Complaint   Patient presents with     RECHECK     F/U TP-IAT     Blood pressure 121/80, pulse 108, weight 63.7 kg (140 lb 8 oz), SpO2 92 %, not currently breastfeeding.    Rosi Haines, CMA

## 2020-08-19 NOTE — PROGRESS NOTES
[facility]  Islet Autotransplant, Diabetes Follow Up    Problem List:  Patient Active Problem List   Diagnosis     Chronic pancreatitis (H)     Pancreatitis     Abnormal semen exam     Abscess of abdominal cavity (H)     Allergic rhinitis     Chronic depression     Chronic pain     Complications of medical care     Diarrhea     Excessive daytime sleepiness     Fluid collection at surgical site     Hypersomnia     Indigestion     Irritable bowel syndrome     Migraine headache     Neoplasm of uncertain behavior of skin     Pain of foot     Pancreas divisum     Abdominal pain, acute     Sleep apnea     Snoring     Systemic inflammatory response syndrome (SIRS) due to noninfectious process (H)     Vitamin D deficiency     Recurrent pancreatitis     Acquired total absence of pancreas     Acute post-operative pain     Post-pancreatectomy diabetes (H)     H/O splenectomy       HPI:  Dottie is a 55 year old female here for follow up of   Laparoscopic hand-assisted total pancreatectomy (complicated by three prior pancreatic surgeries - peustow and open sphincterotomy), Splenectomy, Retrocollic duodenojejunostomy  Antecolic choledochojejunostomy over 8 Fr pediatric FT stent  Gastrojejunostomy placement, Liver biopsy performed on 7/27/2020.  At the time of the procedure, the patient received 53,900 IEQ, or 854 IEQ/kg body weight.    At today's visit, Shaina is on 24 hour tube feeds, clamping tube but gets nauseated after 12 hours or so and unclamps for 5-10 minutes.  We have made frequent insulin dose adjustments.  She had very symptomatic hypoglycemia to 50s the other day and is now quite nervous about hypoglycemia.  We discussed some guidelines on what is 'out of target' and needs to be treated vs what is dangerous hypoglycemia. Very interested in dexcom G6 due to need for frequent insulin dosing and frequent monitoring, as well as due to glycemic variability and fear of hypoglycemia.      Diabetes history:  Current insulin regimen:  Lantus 27 units AM, 20 units PM  Novolog 1 unit per 20 mg/dL>120 mg/dL   -- not yet eating but will plan for 1 unit per 10 grams  Uses about 4-5 injections per typical day.     Patient is good candidate for CGM therapy.  Meets these criteria:  -- Has a diagnosis of diabetes,: This patient has type 1 diabetes secondary to pancreatectomy (surgical type 1)    --  Uses a home blood glucose monitor (BGM) and conduct four or more daily BGM tests, or is on CGM therapy:  Testing BG 6 times per day plus additional for hypoglycemia, which has resulted in about 8 to 9 tests needed per typical day.  --- Treated with insulin with multiple daily injections or a continuous subcutaneous infusion (CSI) pump:  This patient is on MDI  --  Require frequent adjustments of the insulin treatment regimen, based on therapeutic CGM test results:  YES- we are needing frequent adjustments, almost daily.     Recent hemoglobin A1c levels:  Lab Results   Component Value Date    A1C 6.0 07/23/2020     Hypoglycemia history:  Yes, mild to moderate.  The patient has had 0 episodes of severe hypoglycemia (seizure, coma, or neuroglycopenic symptoms severe enough to require assistance from another person).  Blood sugars were reviewed from the patient records and/or the meter download.    Reviewed from logs, range 55- 200s.        Review of systems:  A complete Review Of Systems was performed and was negative except for nausea, post surgical pain and history above.      Past Medical and Surgical History:  Past Medical History:   Diagnosis Date     Allergies      Chronic pancreatitis (H)      Pancreatitis 2000    lst episode during pregnancy     Recurrent acute pancreatitis      Past Surgical History:   Procedure Laterality Date     APPENDECTOMY       BIOPSY LIVER N/A 7/27/2020    Procedure: Biopsy liver;  Surgeon: Steve Jarvis MD;  Location: [facility] OR     LAPAROSCOPIC PANCREATECTOMY, TRANSPLANT AUTO  ISLET CELL N/A 7/27/2020    Procedure: Laproscopipc PANCREATECTOMY, TOTAL, WITH AUTOLOGOUS PANCREATIC ISLET CELL TRANSPLANT, Splenectomy, gastrojejunostomy placement;  Surgeon: Steve Jarvis MD;  Location: [facility] OR     LAPAROTOMY EXPLORATORY      approximately 2002, during pregnancy for pain     open sphincteroplasty/sphincterotomy  04/2011     PANCREATECTOMY PEUSTOW  07/2011     PANCREATECTOMY PEUSTOW  01/2019    revision       Family History:  New changes since last visit:  none  Family History   Problem Relation Age of Onset     Lymphoma Mother         non hodgkins lymphoma     Coronary Artery Disease Mother      Coronary Artery Disease Father      Diabetes No family hx of      Pancreatitis No family hx of      Anesthesia Reaction No family hx of      Deep Vein Thrombosis (DVT) No family hx of        Social History:  Social History     Social History Narrative    Dottie lives in [place] with her family and 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     Here with son.     Physical Exam:  Vitals: /80   Pulse 108   Wt 63.7 kg (140 lb 8 oz)   SpO2 92%   BMI 23.38 kg/m    BMI= Body mass index is 23.38 kg/m .  General:  Well-appearing, NAD  Injection sites:  Intact without lipohypertrophy  Psych:  Communicative, with normal affect         Assessment:  1.  Post pancreatectomy diabetes mellitus, s/p total pancreatectomy and islet autotransplant.  (ICD-10 E89.1)  2.  Type 1 diabetes secondary to pancreatectomy, as outlined in #1 above (Surgical type 1 DM, ICD-10 E10.9)    Dottie is a 55 year old with history of chronic pancreatitis who is s/p total pancreatectomy and islet autotransplant.  She had a low islet mass secondary to prior drainage surgeries and long disease duration, as we expected.  She will need ongoing insulin therapy.  She is an excellent CGM candidate.     Plan:  Patient Instructions   1)  Continue on the 27 units AM, 20 units PM of Lantus.    2)  No changes to correction  scale: 1 unit per 20 mg/dL >120 mg/dL    3)  Not eating yet, discussed moving towards clamping tube for 24 hours before starting PO.  She is going to try to push out to 18 hours (now at 12 hour intervals of clamping).    4)  When starts eating, anticipate carb ratio of 10 grams based on current insulin needs.    5)  Excellent candidate for Dexcom G6 based on MDI, needing 6 or more glucose checks per day, variable BG from 50s to 200s.  However, we did discuss that she will get A LOT more information (288 readings per day) so also not to get overwhelmed or anxious by this info. She will see more highs with a Dexcom vs meter due to more information.         Contact me for questions at [phone] or [phone].  Emergency number to reach pediatric endocrinology after hours is [phone].        Enedina López MD  Associate  Professor, Pediatric Endocrinology and Diabetes  [facility] [place]  [facility]

## 2020-08-19 NOTE — PATIENT INSTRUCTIONS
1)  Continue on the 27 units AM, 20 units PM of Lantus.    2)  No changes to correction scale: 1 unit per 20 mg/dL >120 mg/dL    3)  Not eating yet, discussed moving towards clamping tube for 24 hours before starting PO.  She is going to try to push out to 18 hours (now at 12 hour intervals of clamping).    4)  When starts eating, anticipate carb ratio of 10 grams based on current insulin needs.    5)  Excellent candidate for Dexcom G6 based on MDI, needing 6 or more glucose checks per day, variable BG from 50s to 200s.  However, we did discuss that she will get A LOT more information (288 readings per day) so also not to get overwhelmed or anxious by this info. She will see more highs with a Dexcom vs meter due to more information.

## 2020-08-19 NOTE — TELEPHONE ENCOUNTER
RN triage call from Aide Palacios  Nurse  for  [employer]rd [employer]   She is trying to locate medical records for patient and is trying to locate patient to obtain follow up information to help pay for her medical services  Informed [place] patient is not in one of the [facility] and provided medical records fax number and phone number to go further  Jovanna Whiting RN  [facility] Nurse Advisor    Pre approved procedure

## 2020-08-20 ENCOUNTER — OFFICE VISIT (OUTPATIENT)
Dept: TRANSPLANT | Facility: CLINIC | Age: 55
End: 2020-08-20
Attending: TRANSPLANT SURGERY
Payer: COMMERCIAL

## 2020-08-20 VITALS
DIASTOLIC BLOOD PRESSURE: 76 MMHG | OXYGEN SATURATION: 95 % | BODY MASS INDEX: 22.96 KG/M2 | SYSTOLIC BLOOD PRESSURE: 120 MMHG | TEMPERATURE: 97.8 F | WEIGHT: 138 LBS | HEART RATE: 108 BPM

## 2020-08-20 DIAGNOSIS — Z90.410 ACQUIRED TOTAL ABSENCE OF PANCREAS: ICD-10-CM

## 2020-08-20 NOTE — LETTER
2020         RE: Dottie Hernandez  [address] [facility] [address]        Dear Colleague,    Thank you for referring your patient, Dottie Hernandez, to the [facility]. Please see a copy of my visit note below.    Chronic Pancreatitis Group Progress Note    I had the pleasure of seeing Ms. Dottie Hernandez today. She is 25 days status post total pancreatectomy with islet autotransplant. Doing well but is having nausea.   Interval events since last visit with me:   ER visits = 0, reasons: 0  Inpatient admissions = 0, reasons 0  The patient reports their current symptoms to be:   GI function:   Nausea:   []  none    []  rare    [x]  often    []  daily  Comment:   Vomiting: [x]  none    []  rare    []  often    []  daily   Comment:   Last BM: daily  Appetite:poor due to nausea  Tube feeds     Pancreatic exocrine insufficiency:     Greasy stools: [x]  none   []  rarely    []  several times/wk   []  daily      Comment:   Diabetes management:     Insulin   Lab Results   Component Value Date    A1C 6.0 2020      Pain management:     Long acting agent: none  Short acting agent: Dilaudid 4 mg q4h, but at 8 pm she takes 3 mg    Prescription Medications as of 2020       Rx Number Disp Refills Start End Last Dispensed Date Next Fill Date Owning Pharmacy    pantoprazole (PROTONIX) 2 mg/mL SUSP suspension  300 mL 0 2020    [address] 0-960    Si mLs (40 mg) by Per Feeding Tube route daily    Class: E-Prescribe    Route: Per Feeding Tube    acetaminophen *SUGAR FREE* (TYLENOL) 32 mg/mL solution  1136.8 mL 0 2020    [place], [place] - [address] [place] [address] 1-004    Si.3 mLs (650 mg) by Per J Tube route every 6 hours    Class: E-Prescribe    Route: Per J Tube    Alcohol Swabs PADS  100 each 0 2020    [place], [place] -  [address] [facility]    Sig: Use to swab the area of the injection or erik as directed    Class: E-Prescribe    aspirin (ASA) 325 MG tablet  30 tablet 11 8/6/2020    [address]    Sig: Take 1 tablet (325 mg) by mouth daily    Class: E-Prescribe    Route: Oral    blood glucose (NO BRAND SPECIFIED) lancets standard  100 each 3 8/6/2020    [address]    Sig: Use to test blood sugar 6-8 times daily as directed.    Class: E-Prescribe    Notes to Pharmacy: Pt has Valeriy Contour Next USB meter    blood glucose (NO BRAND SPECIFIED) test strip  200 each 4 8/13/2020    [address] 1-273    Sig: Use to test blood sugar 8-10  times daily as directed.    Class: E-Prescribe    Notes to Pharmacy: Must have contour next strips    Continuous Blood Gluc  (DEXCOM G6 ) TIERRA  1 each 0 8/19/2020    [address] 1-273    Sig: Use to read blood sugars as per 's instructions.    Class: Local Print    Continuous Blood Gluc Sensor (DEXCOM G6 SENSOR) MISC  3 each 11 8/19/2020    [address] 1-273    Sig: Change every 10 days.    Class: Local Print    Continuous Blood Gluc Sensor (DEXCOM G6 SENSOR) MISC  3 each 11 8/19/2020    [address] 1-273    Sig: Change every 10 days.    Class: Local Print    Continuous Blood Gluc Transmit (DEXCOM G6 TRANSMITTER) MISC  1 each 3 8/19/2020    [address] 1-273    Sig: Change every 3 months.    Class: Local Print    docusate (COLACE) 50 MG/5ML liquid  300 mL 1 8/6/2020    [address]     Si mLs (50 mg) by Per J Tube route 2 times daily as needed for constipation    Class: E-Prescribe    Route: Per J Tube    fluconazole (DIFLUCAN) 200 MG tablet  15 tablet 0 2020    [address]    Sig: Take 400 mg for first dose, then 20 mg daily for 2 weeks    Class: E-Prescribe    fluticasone (FLONASE) 50 MCG/ACT nasal spray    2019        Sig: Spray 1 spray into both nostrils 2 times daily     Class: Historical    Route: Both Nostrils    gabapentin (NEURONTIN) 250 MG/5ML solution  540 mL 11 2020    [address]    Si mLs (300 mg) by Oral or J tube route every 8 hours    Class: E-Prescribe    Route: Oral or J tube    Glucagon 3 MG/DOSE POWD  3 each 0 2020    [address]    Sig: [place] 1 each in nostril as needed (For hypoglycemia)    Class: E-Prescribe    Route: Nasal    glucose 40 % (400 mg/mL) gel  3 Tube 0 2020    [address]    Sig: 15 g every 15 minutes by mouth as needed for low blood sugar. Oral gel is preferable for conscious and able to swallow patient.    Class: E-Prescribe    HYDROmorphone, STANDARD CONC, (DILAUDID) 1 MG/ML oral solution  360 mL 0 2020    [id number] - [place], [place] - [address] [facility] AT [address] [facility]    Sig: Take 4-6 mLs (4-6 mg) by mouth every 4 hours    Class: Local Print    Earliest Fill Date: 2020    Route: Oral    insulin aspart (NOVOLOG PEN) 100 UNIT/ML pen  9 mL 0 2020    [address]    Sig: Do Not give Correction Insulin if BG less than 121. -140 = 1 unit -160 = 2 units -180 = 3 units -200 = 4 units -220 = 5 units -240 = 6 units -260 = 7 units -280 = 8 units -300 =  9 units BG >300 = 10 units    Class: Local Print    insulin glargine (LANTUS PEN) 100 UNIT/ML pen  9 mL 0 8/6/2020    [address]    Sig: Inject 25 Units Subcutaneous every evening    Class: E-Prescribe    Notes to Pharmacy: If Lantus is not covered by insurance, may substitute Basaglar at same dose and frequency.      Route: Subcutaneous    insulin glargine (LANTUS PEN) 100 UNIT/ML pen  9 mL 0 8/7/2020    [address]    Sig: Inject 30 Units Subcutaneous every morning    Class: E-Prescribe    Notes to Pharmacy: If Lantus is not covered by insurance, may substitute Basaglar at same dose and frequency.      Route: Subcutaneous    insulin pen needle (32G X 4 MM) 32G X 4 MM miscellaneous  100 each 3 8/6/2020    [address]    Sig: Use for insulin administration 6-8 times daily    Class: E-Prescribe    Lidocaine (LIDOCARE) 4 % Patch  5 patch 0 8/6/2020    [address]    Sig: Place 1 patch onto the skin every 24 hours To prevent lidocaine toxicity, patient should be patch free for 12 hrs daily.    Class: E-Prescribe    Route: Transdermal    LORazepam (ATIVAN) 2 MG/ML (HIGH CONC) solution  8 mL 0 8/6/2020    [address]    Sig: Take 0.25 mLs (0.5 mg) by mouth every 12 hours as needed for anxiety or nausea    Class: E-Prescribe    Route: Oral    methocarbamol (ROBAXIN) 500 MG tablet  30 tablet 0 8/6/2020    [address]    Sig: Take 1 tablet (500 mg) by mouth At Bedtime    Class: E-Prescribe    Route: Oral    metoclopramide (REGLAN) 5 MG/5ML solution  560 mL 0 8/6/2020    [address]    Sig: 10 mLs (10 mg) by Per J Tube  route 4 times daily (before meals and nightly)    Class: E-Prescribe    Route: Per J Tube    multivitamins w/minerals (CERTAVITE) liquid  210 mL 0 8/6/2020    [address]    Sig: 15 mLs by Oral or Feeding Tube route daily    Class: E-Prescribe    Route: Oral or Feeding Tube    ondansetron (ZOFRAN-ODT) 4 MG ODT tab  40 tablet 0 8/6/2020    [address]    Sig: Take 1 tablet (4 mg) by mouth every 6 hours as needed for nausea or vomiting    Class: E-Prescribe    Route: Oral    ondansetron (ZOFRAN-ODT) 4 MG ODT tab    1/7/2019        Sig: Take 4 mg by mouth every 8 hours as needed     Class: Historical    Route: Oral    sennosides (SENOKOT) 8.8 MG/5ML syrup  150 mL 0 8/6/2020    [address]    Sig: Take 5 mLs by mouth 2 times daily    Class: E-Prescribe    Route: Oral    Sharps Container MISC  1 each 0 8/6/2020    [address]    Sig: Use as directed to dispose of needles, lancets and other sharps    Class: E-Prescribe        Physical exam:   General Appearance: in no apparent distress.       Skin: Normal, no rashes or jaundice  Heart: Regular rhythm.  Lungs: no audible wheezes or increased work of breathing.  Abdomen: The abdomen is flat, and the wound is Healing well, without hernia.  Tube exit site is clean. The abdomen is non-tender  Edema: absent.    Chronic Pancreatitis Latest Ref Rng & Units 8/6/2020 8/7/2020 8/13/2020 8/19/2020 8/20/2020   Weight - - - 64.048 kg 63.73 kg 62.596 kg   AMYLASE 30 - 110 U/L - - - - -   LIPASE 73 - 393 U/L - - - - -   HEMOGLOBIN A1C 0 - 5.6 % - - - - -   C PEPTIDE 0.9 - 6.9 ng/mL - - - - -   GLUCOSE 70 - 99 mg/dL 162(H) 207(H) 103(H) - -   HGB 11.7 - 15.7 g/dL 8.9(L) 9.4(L) 8.9(L) - -    - 450 10e9/L 1,300() 1,622() 1,902() - -   ALBUMIN 3.4  - 5.0 g/dL - 3.0(L) 2.9(L) - -   PREALBUMIN 15 - 45 mg/dL - - - - -       Assessment and Plan: She is doing well s/p TP-IAT.  Interval progress has been fair.  Postoperative gastric ileus: yes, restart scheduled Reglan 10 mg tid  Pancreatic exocrine insufficiency: controlled  Malnutrition: continue TFs  Diabetes mellitus: per Dr. López  Abdominal pain management: controlled, reduce to 3 mg Dilaudid q4h; tylenol 650 mg tid  Followup: I will see her again in clinic in 1 week    Total time: 30 min, Counselling Time: 20 min.    Steve Jarvis MD      Again, thank you for allowing me to participate in the care of your patient.        Sincerely,        Steve Jarvis MD

## 2020-08-20 NOTE — PROGRESS NOTES
This is a recent snapshot of the patient's [facility] medical record.  For current drug dose and complete information and questions, call [phone] or In Basket pool, [facility] ([id number])  CSN Number:  [id number]

## 2020-08-20 NOTE — PROGRESS NOTES
Chronic Pancreatitis Group Progress Note    I had the pleasure of seeing Ms. Dottie Hernandez today. She is 25 days status post total pancreatectomy with islet autotransplant. Doing well but is having nausea.   Interval events since last visit with me:   ER visits = 0, reasons: 0  Inpatient admissions = 0, reasons 0  The patient reports their current symptoms to be:   GI function:   Nausea:   []  none    []  rare    [x]  often    []  daily  Comment:   Vomiting: [x]  none    []  rare    []  often    []  daily   Comment:   Last BM: daily  Appetite:poor due to nausea  Tube feeds     Pancreatic exocrine insufficiency:     Greasy stools: [x]  none   []  rarely    []  several times/wk   []  daily      Comment:   Diabetes management:     Insulin   Lab Results   Component Value Date    A1C 6.0 2020      Pain management:     Long acting agent: none  Short acting agent: Dilaudid 4 mg q4h, but at 8 pm she takes 3 mg    Prescription Medications as of 2020       Rx Number Disp Refills Start End Last Dispensed Date Next Fill Date Owning Pharmacy    pantoprazole (PROTONIX) 2 mg/mL SUSP suspension  300 mL 0 2020    [address] 1-329    Si mLs (40 mg) by Per Feeding Tube route daily    Class: E-Prescribe    Route: Per Feeding Tube    acetaminophen *SUGAR FREE* (TYLENOL) 32 mg/mL solution  1136.8 mL 0 2020    [address] 1-348    Si.3 mLs (650 mg) by Per J Tube route every 6 hours    Class: E-Prescribe    Route: Per J Tube    Alcohol Swabs PADS  100 each 0 2020    [address]    Sig: Use to swab the area of the injection or erik as directed    Class: E-Prescribe    aspirin (ASA) 325 MG tablet  30 tablet 11 2020    [address]    Sig: Take 1  tablet (325 mg) by mouth daily    Class: E-Prescribe    Route: Oral    blood glucose (NO BRAND SPECIFIED) lancets standard  100 each 3 2020    [address]    Sig: Use to test blood sugar 6-8 times daily as directed.    Class: E-Prescribe    Notes to Pharmacy: Pt has Valeriy Contour Next USB meter    blood glucose (NO BRAND SPECIFIED) test strip  200 each 4 2020    [address] [address]    Sig: Use to test blood sugar 8-10  times daily as directed.    Class: E-Prescribe    Notes to Pharmacy: Must have contour next strips    Continuous Blood Gluc  (DEXCOM G6 ) TIERRA  1 each 0 2020    [address]22    Sig: Use to read blood sugars as per 's instructions.    Class: Local Print    Continuous Blood Gluc Sensor (DEXCOM G6 SENSOR) MISC  3 each 11 2020    [address]     Sig: Change every 10 days.    Class: Local Print    Continuous Blood Gluc Sensor (DEXCOM G6 SENSOR) MISC  3 each 11 2020    [address]     Sig: Change every 10 days.    Class: Local Print    Continuous Blood Gluc Transmit (DEXCOM G6 TRANSMITTER) MISC  1 each 3 2020    [address] 1    Sig: Change every 3 months.    Class: Local Print    docusate (COLACE) 50 MG/5ML liquid  300 mL 1 2020    [address]    Si mLs (50 mg) by Per J Tube route 2 times daily as needed for constipation    Class: E-Prescribe    Route: Per J Tube    fluconazole (DIFLUCAN) 200 MG tablet  15 tablet 0 2020    [address]  [facility]    Sig: Take 400 mg for first dose, then 20 mg daily for 2 weeks    Class: E-Prescribe    fluticasone (FLONASE) 50 MCG/ACT nasal spray    2019        Sig: Spray 1 spray into both nostrils 2 times daily     Class: Historical    Route: Both Nostrils    gabapentin (NEURONTIN) 250 MG/5ML solution  540 mL 11 2020    [address]    Si mLs (300 mg) by Oral or J tube route every 8 hours    Class: E-Prescribe    Route: Oral or J tube    Glucagon 3 MG/DOSE POWD  3 each 0 2020    [address]    Sig: [place] 1 each in nostril as needed (For hypoglycemia)    Class: E-Prescribe    Route: Nasal    glucose 40 % (400 mg/mL) gel  3 Tube 0 2020    [address]    Sig: 15 g every 15 minutes by mouth as needed for low blood sugar. Oral gel is preferable for conscious and able to swallow patient.    Class: E-Prescribe    HYDROmorphone, STANDARD CONC, (DILAUDID) 1 MG/ML oral solution  360 mL 0 2020    [facility] #[id number] - [place], [place] - [address] [facility] [address] [facility]    Sig: Take 4-6 mLs (4-6 mg) by mouth every 4 hours    Class: Local Print    Earliest Fill Date: 2020    Route: Oral    insulin aspart (NOVOLOG PEN) 100 UNIT/ML pen  9 mL 0 2020    [address]    Sig: Do Not give Correction Insulin if BG less than 121. -140 = 1 unit -160 = 2 units -180 = 3 units -200 = 4 units -220 = 5 units -240 = 6 units -260 = 7 units -280 = 8 units -300 = 9 units BG >300 = 10 units    Class: Local Print    insulin glargine (LANTUS PEN) 100 UNIT/ML pen  9 mL 0 2020    [address]    Sig: Inject 25 Units Subcutaneous every evening    Class:  E-Prescribe    Notes to Pharmacy: If Lantus is not covered by insurance, may substitute Basaglar at same dose and frequency.      Route: Subcutaneous    insulin glargine (LANTUS PEN) 100 UNIT/ML pen  9 mL 0 8/7/2020    [address]    Sig: Inject 30 Units Subcutaneous every morning    Class: E-Prescribe    Notes to Pharmacy: If Lantus is not covered by insurance, may substitute Basaglar at same dose and frequency.      Route: Subcutaneous    insulin pen needle (32G X 4 MM) 32G X 4 MM miscellaneous  100 each 3 8/6/2020    [address]    Sig: Use for insulin administration 6-8 times daily    Class: E-Prescribe    Lidocaine (LIDOCARE) 4 % Patch  5 patch 0 8/6/2020    [address]    Sig: Place 1 patch onto the skin every 24 hours To prevent lidocaine toxicity, patient should be patch free for 12 hrs daily.    Class: E-Prescribe    Route: Transdermal    LORazepam (ATIVAN) 2 MG/ML (HIGH CONC) solution  8 mL 0 8/6/2020    [address]    Sig: Take 0.25 mLs (0.5 mg) by mouth every 12 hours as needed for anxiety or nausea    Class: E-Prescribe    Route: Oral    methocarbamol (ROBAXIN) 500 MG tablet  30 tablet 0 8/6/2020    [address]    Sig: Take 1 tablet (500 mg) by mouth At Bedtime    Class: E-Prescribe    Route: Oral    metoclopramide (REGLAN) 5 MG/5ML solution  560 mL 0 8/6/2020    [address]    Sig: 10 mLs (10 mg) by Per J Tube route 4 times daily (before meals and nightly)    Class: E-Prescribe    Route: Per J Tube    multivitamins w/minerals (CERTAVITE) liquid  210 mL 0 8/6/2020    [address]    Sig: 15 mLs by  Oral or Feeding Tube route daily    Class: E-Prescribe    Route: Oral or Feeding Tube    ondansetron (ZOFRAN-ODT) 4 MG ODT tab  40 tablet 0 8/6/2020    [address]    Sig: Take 1 tablet (4 mg) by mouth every 6 hours as needed for nausea or vomiting    Class: E-Prescribe    Route: Oral    ondansetron (ZOFRAN-ODT) 4 MG ODT tab    1/7/2019        Sig: Take 4 mg by mouth every 8 hours as needed     Class: Historical    Route: Oral    sennosides (SENOKOT) 8.8 MG/5ML syrup  150 mL 0 8/6/2020    [address]    Sig: Take 5 mLs by mouth 2 times daily    Class: E-Prescribe    Route: Oral    Sharps Container MISC  1 each 0 8/6/2020    [address]    Sig: Use as directed to dispose of needles, lancets and other sharps    Class: E-Prescribe        Physical exam:   General Appearance: in no apparent distress.       Skin: Normal, no rashes or jaundice  Heart: Regular rhythm.  Lungs: no audible wheezes or increased work of breathing.  Abdomen: The abdomen is flat, and the wound is Healing well, without hernia.  Tube exit site is clean. The abdomen is non-tender  Edema: absent.    Chronic Pancreatitis Latest Ref Rng & Units 8/6/2020 8/7/2020 8/13/2020 8/19/2020 8/20/2020   Weight - - - 64.048 kg 63.73 kg 62.596 kg   AMYLASE 30 - 110 U/L - - - - -   LIPASE 73 - 393 U/L - - - - -   HEMOGLOBIN A1C 0 - 5.6 % - - - - -   C PEPTIDE 0.9 - 6.9 ng/mL - - - - -   GLUCOSE 70 - 99 mg/dL 162(H) 207(H) 103(H) - -   HGB 11.7 - 15.7 g/dL 8.9(L) 9.4(L) 8.9(L) - -    - 450 10e9/L 1,300(HH) 1,622(HH) 1,902(HH) - -   ALBUMIN 3.4 - 5.0 g/dL - 3.0(L) 2.9(L) - -   PREALBUMIN 15 - 45 mg/dL - - - - -       Assessment and Plan: She is doing well s/p TP-IAT.  Interval progress has been fair.  Postoperative gastric ileus: yes, restart scheduled Reglan 10 mg tid  Pancreatic  exocrine insufficiency: controlled  Malnutrition: continue TFs  Diabetes mellitus: per Dr. López  Abdominal pain management: controlled, reduce to 3 mg Dilaudid q4h; tylenol 650 mg tid  Followup: I will see her again in clinic in 1 week    Total time: 30 min, Counselling Time: 20 min.    Steve Jarvis MD

## 2020-08-20 NOTE — NURSING NOTE
Chief Complaint   Patient presents with     RECHECK     Follow up     Blood pressure 120/76, pulse 108, temperature 97.8  F (36.6  C), temperature source Oral, weight 62.6 kg (138 lb), SpO2 95 %, not currently breastfeeding.    Sangeeta Segovia on 8/20/2020 at 12:49 PM'

## 2020-08-21 DIAGNOSIS — Z90.410 ACQUIRED TOTAL ABSENCE OF PANCREAS: ICD-10-CM

## 2020-08-22 DIAGNOSIS — Z90.410 ACQUIRED TOTAL ABSENCE OF PANCREAS: ICD-10-CM

## 2020-08-22 RX ORDER — ONDANSETRON 4 MG/1
4 TABLET, ORALLY DISINTEGRATING ORAL EVERY 6 HOURS PRN
Qty: 40 TABLET | Refills: 0 | Status: SHIPPED | OUTPATIENT
Start: 2020-08-22 | End: 2020-09-03

## 2020-08-25 DIAGNOSIS — E89.1 POST-PANCREATECTOMY DIABETES (H): ICD-10-CM

## 2020-08-25 DIAGNOSIS — E13.9 POST-PANCREATECTOMY DIABETES (H): ICD-10-CM

## 2020-08-25 DIAGNOSIS — Z90.410 POST-PANCREATECTOMY DIABETES (H): ICD-10-CM

## 2020-08-25 DIAGNOSIS — K86.81 EXOCRINE PANCREATIC INSUFFICIENCY: Primary | ICD-10-CM

## 2020-08-27 ENCOUNTER — OFFICE VISIT (OUTPATIENT)
Dept: TRANSPLANT | Facility: CLINIC | Age: 55
End: 2020-08-27
Attending: TRANSPLANT SURGERY
Payer: COMMERCIAL

## 2020-08-27 VITALS
BODY MASS INDEX: 22.8 KG/M2 | WEIGHT: 137 LBS | HEART RATE: 90 BPM | TEMPERATURE: 98.1 F | OXYGEN SATURATION: 97 % | DIASTOLIC BLOOD PRESSURE: 73 MMHG | SYSTOLIC BLOOD PRESSURE: 115 MMHG

## 2020-08-27 DIAGNOSIS — Z90.410 ABSENCE OF PANCREAS, ACQUIRED: Primary | ICD-10-CM

## 2020-08-27 DIAGNOSIS — Z90.410 ACQUIRED TOTAL ABSENCE OF PANCREAS: ICD-10-CM

## 2020-08-27 DIAGNOSIS — R10.9 ABDOMINAL PAIN: Primary | ICD-10-CM

## 2020-08-27 DIAGNOSIS — K86.81 EXOCRINE PANCREATIC INSUFFICIENCY: ICD-10-CM

## 2020-08-27 RX ORDER — METHOCARBAMOL 500 MG/1
500 TABLET, FILM COATED ORAL AT BEDTIME
Qty: 30 TABLET | Refills: 0 | Status: SHIPPED | OUTPATIENT
Start: 2020-08-27

## 2020-08-27 RX ORDER — PANTOPRAZOLE SODIUM 40 MG/1
TABLET, DELAYED RELEASE ORAL
Qty: 30 TABLET | Refills: 1 | Status: SHIPPED | OUTPATIENT
Start: 2020-08-27 | End: 2020-09-03

## 2020-08-27 RX ORDER — GABAPENTIN 300 MG/1
300 CAPSULE ORAL 3 TIMES DAILY
Qty: 90 CAPSULE | Refills: 1 | Status: SHIPPED | OUTPATIENT
Start: 2020-08-27 | End: 2020-09-03

## 2020-08-27 NOTE — LETTER
2020         RE: Dottie Hernandez  [address]  [facility] [address]        Dear Colleague,    Thank you for referring your patient, Dottie Hernandez, to the [facility]. Please see a copy of my visit note below.    Chief Complaint   Patient presents with     RECHECK     New TP-IAT     Blood pressure 115/73, pulse 90, temperature 98.1  F (36.7  C), temperature source Oral, weight 62.1 kg (137 lb), SpO2 97 %, not currently breastfeeding.    Rosi Haines, [facility]      Chronic Pancreatitis Group Progress Note    I had the pleasure of seeing Ms. Dottie Hernandez today. She is 31 days status post total pancreatectomy with islet autotransplant.   Interval events since last visit with me:   ER visits = 0, reasons: 0  Inpatient admissions = 0, reasons 0  The patient reports their current symptoms to be:   GI function:   Nausea:   [x]  none    []  rare    []  often    []  daily  Comment:   Vomiting: [x]  none    []  rare    []  often    []  daily   Comment:   Last BM: Today.  Stools are soft, frequency : daily.   Appetite: good  Tube feeds full  Oral food intake: 1-3 per day  Pancreatic exocrine insufficiency:     Greasy stools: [x]  none   []  rarely    []  several times/wk   []  daily      Comment:   Diabetes management:     Lab Results   Component Value Date    A1C 6.0 2020      Pain management:       Short acting agent: hydrocodone 3 mg q4h.    Prescription Medications as of 2020       Rx Number Disp Refills Start End Last Dispensed Date Next Fill Date Owning Pharmacy    acetaminophen *SUGAR FREE* (TYLENOL) 32 mg/mL solution  550 mL 0 2020    [place], [place] - [address] [facility] 0-334    Si.3 mLs (650 mg) by Per J Tube route every 6 hours    Class: E-Prescribe    Route: Per J Tube    Alcohol Swabs PADS  100 each 0 2020    [place], [place] - [address] [facility]    Sig: Use to swab the area of  the injection or erik as directed    Class: E-Prescribe    amylase-lipase-protease (CREON 24) [address]2614-51622 units CPEP per EC capsule  300 capsule 1 8/25/2020    [address]    Sig: Take 2 capsules with meals and 1 with snacks; adjust dose per physician instruction    Class: E-Prescribe    aspirin (ASA) 325 MG tablet  30 tablet 11 8/6/2020    [address]    Sig: Take 1 tablet (325 mg) by mouth daily    Class: E-Prescribe    Route: Oral    blood glucose (NO BRAND SPECIFIED) lancets standard  100 each 3 8/6/2020    [address]    Sig: Use to test blood sugar 6-8 times daily as directed.    Class: E-Prescribe    Notes to Pharmacy: Pt has Valeriy Contour Next USB meter    blood glucose (NO BRAND SPECIFIED) test strip  200 each 4 8/13/2020    [address]    Sig: Use to test blood sugar 8-10  times daily as directed.    Class: E-Prescribe    Notes to Pharmacy: Must have contour next strips    Continuous Blood Gluc  (DEXCOM G6 ) TIERRA  1 each 0 8/19/2020    [address]    Sig: Use to read blood sugars as per 's instructions.    Class: Local Print    Continuous Blood Gluc Sensor (DEXCOM G6 SENSOR) MISC  3 each 11 8/19/2020    [address]    Sig: Change every 10 days.    Class: Local Print    Continuous Blood Gluc Sensor (DEXCOM G6 SENSOR) MISC  3 each 11 8/19/2020    [address]    Sig: Change every 10 days.    Class: Local Print    Continuous Blood Gluc Transmit (DEXCOM G6 TRANSMITTER) MISC  1 each 3 8/19/2020    [facility]  [phone]    Sig: Change every 3 months.    Class: Local Print    docusate (COLACE) 50 MG/5ML liquid  300 mL 1 2020    [address]    Si mLs (50 mg) by Per J Tube route 2 times daily as needed for constipation    Class: E-Prescribe    Route: Per J Tube    fluconazole (DIFLUCAN) 200 MG tablet  15 tablet 0 2020    [address]    Sig: Take 400 mg for first dose, then 20 mg daily for 2 weeks    Class: E-Prescribe    fluticasone (FLONASE) 50 MCG/ACT nasal spray    2019        Sig: Spray 1 spray into both nostrils 2 times daily     Class: Historical    Route: Both Nostrils    gabapentin (NEURONTIN) 250 MG/5ML solution  540 mL 11 2020    [address]    Si mLs (300 mg) by Oral or J tube route every 8 hours    Class: E-Prescribe    Route: Oral or J tube    Glucagon 3 MG/DOSE POWD  3 each 0 2020    [address]    Sig: [place] 1 each in nostril as needed (For hypoglycemia)    Class: E-Prescribe    Route: Nasal    glucose 40 % (400 mg/mL) gel  3 Tube 0 2020    [address]    Sig: 15 g every 15 minutes by mouth as needed for low blood sugar. Oral gel is preferable for conscious and able to swallow patient.    Class: E-Prescribe    HYDROmorphone, STANDARD CONC, (DILAUDID) 1 MG/ML oral solution  360 mL 0 2020    [id number] - [place], [place] - [address] [facility] [address]    Sig: Take 4-6 mLs (4-6 mg) by mouth every 4 hours    Class: Local Print    Earliest Fill Date: 2020    Route: Oral    insulin aspart (NOVOLOG PEN) 100 UNIT/ML pen  9 mL 0 2020    [place] [address]  [facility]    Sig: Do Not give Correction Insulin if BG less than 121. -140 = 1 unit -160 = 2 units -180 = 3 units -200 = 4 units -220 = 5 units -240 = 6 units -260 = 7 units -280 = 8 units -300 = 9 units BG >300 = 10 units    Class: Local Print    insulin glargine (LANTUS PEN) 100 UNIT/ML pen  15 mL 1 8/25/2020    [address]    Sig: Inject 24 units in the morning and 10 units in the evening    Class: E-Prescribe    Notes to Pharmacy: If Lantus is not covered by insurance, may substitute Basaglar at same dose and frequency.      insulin pen needle (32G X 4 MM) 32G X 4 MM miscellaneous  100 each 3 8/6/2020    [address]    Sig: Use for insulin administration 6-8 times daily    Class: E-Prescribe    Lidocaine (LIDOCARE) 4 % Patch  5 patch 0 8/6/2020    [address]    Sig: Place 1 patch onto the skin every 24 hours To prevent lidocaine toxicity, patient should be patch free for 12 hrs daily.    Class: E-Prescribe    Route: Transdermal    LORazepam (ATIVAN) 2 MG/ML (HIGH CONC) solution  8 mL 0 8/6/2020    [address]    Sig: Take 0.25 mLs (0.5 mg) by mouth every 12 hours as needed for anxiety or nausea    Class: E-Prescribe    Route: Oral    methocarbamol (ROBAXIN) 500 MG tablet  30 tablet 0 8/6/2020    [address]    Sig: Take 1 tablet (500 mg) by mouth At Bedtime    Class: E-Prescribe    Route: Oral    metoclopramide (REGLAN) 5 MG/5ML solution  560 mL 0 8/6/2020    [address]    Sig: 10 mLs (10 mg) by Per J Tube route 4 times daily (before meals and nightly)    Class: E-Prescribe    Route: Per J Tube     multivitamins w/minerals (CERTAVITE) liquid  210 mL 0 2020    [address]    Sig: 15 mLs by Oral or Feeding Tube route daily    Class: E-Prescribe    Route: Oral or Feeding Tube    ondansetron (ZOFRAN-ODT) 4 MG ODT tab  40 tablet 0 2020    [address]    Sig: Take 1 tablet (4 mg) by mouth every 6 hours as needed for nausea or vomiting    Class: E-Prescribe    Route: Oral    ondansetron (ZOFRAN-ODT) 4 MG ODT tab    2019        Sig: Take 4 mg by mouth every 8 hours as needed     Class: Historical    Route: Oral    pantoprazole (PROTONIX) 2 mg/mL SUSP suspension  300 mL 0 2020    [address]    Si mLs (40 mg) by Per Feeding Tube route daily    Class: E-Prescribe    Route: Per Feeding Tube    sennosides (SENOKOT) 8.8 MG/5ML syrup  150 mL 0 2020    [place], [place] - [address] [place] [address]    Sig: Take 5 mLs by mouth 2 times daily    Class: E-Prescribe    Route: Oral    Sharps Container MISC  1 each 0 2020    [address]    Sig: Use as directed to dispose of needles, lancets and other sharps    Class: E-Prescribe        Physical exam:   General Appearance: in no apparent distress.   Temp:  [98.1  F (36.7  C)] 98.1  F (36.7  C)  Pulse:  [90] 90  BP: (115)/(73) 115/73  SpO2:  [97 %] 97 %   Skin: Normal, no rashes or jaundice  Heart: Regular rhythm.  Lungs: no audible wheezes or increased work of breathing.  Abdomen: The abdomen is flat, and the wound is Healing well, without hernia.  Tube exit site is clean. The abdomen is non-tender  Edema: absent.    Chronic Pancreatitis Latest Ref Rng & Units 2020   Weight - - 64.048 kg 63.73 kg 62.596 kg 62.143 kg   AMYLASE  30 - 110 U/L - - - - -   LIPASE 73 - 393 U/L - - - - -   HEMOGLOBIN A1C 0 - 5.6 % - - - - -   C PEPTIDE 0.9 - 6.9 ng/mL - - - - -   GLUCOSE 70 - 99 mg/dL 207(H) 103(H) - - -   HGB 11.7 - 15.7 g/dL 9.4(L) 8.9(L) - - -    - 450 10e9/L 1,622(HH) 1,902(HH) - - -   ALBUMIN 3.4 - 5.0 g/dL 3.0(L) 2.9(L) - - -   PREALBUMIN 15 - 45 mg/dL - - - - -       Assessment and Plan: She is doing well s/p TP-IAT.  Interval progress has been good.  Postoperative gastric ileus: resolved  Pancreatic exocrine insufficiency: controlled  Malnutrition: none; cut TFs in half today, ADAT, next Monday may stop TFs if she is doing well and plan to take out FT on Thursday in clinic  Diabetes mellitus: per Dr. López  Abdominal pain management: decrease Dilaudid to 2 mg q4h  Followup: I will see her again in clinic in 1 week   Plan to discharge to [place] Week of Sept 6th    Total time: 30 min, Counselling Time: 20 min.    Steve Jarvis MD

## 2020-08-27 NOTE — PROGRESS NOTES
Chronic Pancreatitis Group Progress Note    I had the pleasure of seeing Ms. Dottie Hernandez today. She is 31 days status post total pancreatectomy with islet autotransplant.   Interval events since last visit with me:   ER visits = 0, reasons: 0  Inpatient admissions = 0, reasons 0  The patient reports their current symptoms to be:   GI function:   Nausea:   [x]  none    []  rare    []  often    []  daily  Comment:   Vomiting: [x]  none    []  rare    []  often    []  daily   Comment:   Last BM: Today.  Stools are soft, frequency : daily.   Appetite: good  Tube feeds full  Oral food intake: 1-3 per day  Pancreatic exocrine insufficiency:     Greasy stools: [x]  none   []  rarely    []  several times/wk   []  daily      Comment:   Diabetes management:     Lab Results   Component Value Date    A1C 6.0 2020      Pain management:       Short acting agent: hydrocodone 3 mg q4h.    Prescription Medications as of 2020       Rx Number Disp Refills Start End Last Dispensed Date Next Fill Date Owning Pharmacy    acetaminophen *SUGAR FREE* (TYLENOL) 32 mg/mL solution  550 mL 0 2020    [address]    Si.3 mLs (650 mg) by Per J Tube route every 6 hours    Class: E-Prescribe    Route: Per J Tube    Alcohol Swabs PADS  100 each 0 2020    [address]    Sig: Use to swab the area of the injection or erik as directed    Class: E-Prescribe    amylase-lipase-protease (CREON 24) 34795-[address] units CPEP per EC capsule  300 capsule 1 2020    [address]    Sig: Take 2 capsules with meals and 1 with snacks; adjust dose per physician instruction    Class: E-Prescribe    aspirin (ASA) 325 MG tablet  30 tablet 11 2020    [address]    Sig: Take 1  tablet (325 mg) by mouth daily    Class: E-Prescribe    Route: Oral    blood glucose (NO BRAND SPECIFIED) lancets standard  100 each 3 2020    [address]    Sig: Use to test blood sugar 6-8 times daily as directed.    Class: E-Prescribe    Notes to Pharmacy: Pt has Valeriy Contour Next USB meter    blood glucose (NO BRAND SPECIFIED) test strip  200 each 4 2020    [address]    Sig: Use to test blood sugar 8-10  times daily as directed.    Class: E-Prescribe    Notes to Pharmacy: Must have contour next strips    Continuous Blood Gluc  (DEXCOM G6 ) TIERRA  1 each 0 2020    [address]    Sig: Use to read blood sugars as per 's instructions.    Class: Local Print    Continuous Blood Gluc Sensor (DEXCOM G6 SENSOR) MISC  3 each 11 2020    [address]     Sig: Change every 10 days.    Class: Local Print    Continuous Blood Gluc Sensor (DEXCOM G6 SENSOR) MISC  3 each 11 2020    [address]     Sig: Change every 10 days.    Class: Local Print    Continuous Blood Gluc Transmit (DEXCOM G6 TRANSMITTER) MISC  1 each 3 2020    [address] 1    Sig: Change every 3 months.    Class: Local Print    docusate (COLACE) 50 MG/5ML liquid  300 mL 1 2020    [address]    Si mLs (50 mg) by Per J Tube route 2 times daily as needed for constipation    Class: E-Prescribe    Route: Per J Tube    fluconazole (DIFLUCAN) 200 MG tablet  15 tablet 0 2020    [address]  [facility]    Sig: Take 400 mg for first dose, then 20 mg daily for 2 weeks    Class: E-Prescribe    fluticasone (FLONASE) 50 MCG/ACT nasal spray    2019        Sig: Spray 1 spray into both nostrils 2 times daily     Class: Historical    Route: Both Nostrils    gabapentin (NEURONTIN) 250 MG/5ML solution  540 mL 11 2020    [address]    Si mLs (300 mg) by Oral or J tube route every 8 hours    Class: E-Prescribe    Route: Oral or J tube    Glucagon 3 MG/DOSE POWD  3 each 0 2020    [address]    Sig: [place] 1 each in nostril as needed (For hypoglycemia)    Class: E-Prescribe    Route: Nasal    glucose 40 % (400 mg/mL) gel  3 Tube 0 2020    [address]    Sig: 15 g every 15 minutes by mouth as needed for low blood sugar. Oral gel is preferable for conscious and able to swallow patient.    Class: E-Prescribe    HYDROmorphone, STANDARD CONC, (DILAUDID) 1 MG/ML oral solution  360 mL 0 2020    [id number] - [place], [place] - [address] [facility] [address] [facility]    Sig: Take 4-6 mLs (4-6 mg) by mouth every 4 hours    Class: Local Print    Earliest Fill Date: 2020    Route: Oral    insulin aspart (NOVOLOG PEN) 100 UNIT/ML pen  9 mL 0 2020    [address]    Sig: Do Not give Correction Insulin if BG less than 121. -140 = 1 unit -160 = 2 units -180 = 3 units -200 = 4 units -220 = 5 units -240 = 6 units -260 = 7 units -280 = 8 units -300 = 9 units BG >300 = 10 units    Class: Local Print    insulin glargine (LANTUS PEN) 100 UNIT/ML pen  15 mL 1 2020    [address]    Sig: Inject 24 units in the morning and 10  units in the evening    Class: E-Prescribe    Notes to Pharmacy: If Lantus is not covered by insurance, may substitute Basaglar at same dose and frequency.      insulin pen needle (32G X 4 MM) 32G X 4 MM miscellaneous  100 each 3 8/6/2020    [address]    Sig: Use for insulin administration 6-8 times daily    Class: E-Prescribe    Lidocaine (LIDOCARE) 4 % Patch  5 patch 0 8/6/2020    [address]    Sig: Place 1 patch onto the skin every 24 hours To prevent lidocaine toxicity, patient should be patch free for 12 hrs daily.    Class: E-Prescribe    Route: Transdermal    LORazepam (ATIVAN) 2 MG/ML (HIGH CONC) solution  8 mL 0 8/6/2020    [address]    Sig: Take 0.25 mLs (0.5 mg) by mouth every 12 hours as needed for anxiety or nausea    Class: E-Prescribe    Route: Oral    methocarbamol (ROBAXIN) 500 MG tablet  30 tablet 0 8/6/2020    [address]    Sig: Take 1 tablet (500 mg) by mouth At Bedtime    Class: E-Prescribe    Route: Oral    metoclopramide (REGLAN) 5 MG/5ML solution  560 mL 0 8/6/2020    [address]    Sig: 10 mLs (10 mg) by Per J Tube route 4 times daily (before meals and nightly)    Class: E-Prescribe    Route: Per J Tube    multivitamins w/minerals (CERTAVITE) liquid  210 mL 0 8/6/2020    [address]    Sig: 15 mLs by Oral or Feeding Tube route daily    Class: E-Prescribe    Route: Oral or Feeding Tube    ondansetron (ZOFRAN-ODT) 4 MG ODT tab  40 tablet 0 8/22/2020    [address]    Sig: Take 1 tablet (4 mg) by mouth every 6 hours as needed for nausea or vomiting    Class: E-Prescribe     Route: Oral    ondansetron (ZOFRAN-ODT) 4 MG ODT tab    2019        Sig: Take 4 mg by mouth every 8 hours as needed     Class: Historical    Route: Oral    pantoprazole (PROTONIX) 2 mg/mL SUSP suspension  300 mL 0 2020    [place] [address] [facility] 5-746    Si mLs (40 mg) by Per Feeding Tube route daily    Class: E-Prescribe    Route: Per Feeding Tube    sennosides (SENOKOT) 8.8 MG/5ML syrup  150 mL 0 2020    [address]    Sig: Take 5 mLs by mouth 2 times daily    Class: E-Prescribe    Route: Oral    Sharps Container MISC  1 each 0 2020    [address]    Sig: Use as directed to dispose of needles, lancets and other sharps    Class: E-Prescribe        Physical exam:   General Appearance: in no apparent distress.   Temp:  [98.1  F (36.7  C)] 98.1  F (36.7  C)  Pulse:  [90] 90  BP: (115)/(73) 115/73  SpO2:  [97 %] 97 %   Skin: Normal, no rashes or jaundice  Heart: Regular rhythm.  Lungs: no audible wheezes or increased work of breathing.  Abdomen: The abdomen is flat, and the wound is Healing well, without hernia.  Tube exit site is clean. The abdomen is non-tender  Edema: absent.    Chronic Pancreatitis Latest Ref Rng & Units 2020   Weight - - 64.048 kg 63.73 kg 62.596 kg 62.143 kg   AMYLASE 30 - 110 U/L - - - - -   LIPASE 73 - 393 U/L - - - - -   HEMOGLOBIN A1C 0 - 5.6 % - - - - -   C PEPTIDE 0.9 - 6.9 ng/mL - - - - -   GLUCOSE 70 - 99 mg/dL 207(H) 103(H) - - -   HGB 11.7 - 15.7 g/dL 9.4(L) 8.9(L) - - -    - 450 10e9/L 1,622(HH) 1,902(HH) - - -   ALBUMIN 3.4 - 5.0 g/dL 3.0(L) 2.9(L) - - -   PREALBUMIN 15 - 45 mg/dL - - - - -       Assessment and Plan: She is doing well s/p TP-IAT.  Interval progress has been good.  Postoperative gastric ileus: resolved  Pancreatic exocrine  insufficiency: controlled  Malnutrition: none; cut TFs in half today, ADAT, next Monday may stop TFs if she is doing well and plan to take out FT on Thursday in clinic  Diabetes mellitus: per Dr. López  Abdominal pain management: decrease Dilaudid to 2 mg q4h  Followup: I will see her again in clinic in 1 week   Plan to discharge to [place] Week of Sept 6th    Total time: 30 min, Counselling Time: 20 min.    Steve Jarvis MD

## 2020-08-27 NOTE — PROGRESS NOTES
Chief Complaint   Patient presents with     RECHECK     New TP-IAT     Blood pressure 115/73, pulse 90, temperature 98.1  F (36.7  C), temperature source Oral, weight 62.1 kg (137 lb), SpO2 97 %, not currently breastfeeding.    Rosi Haines, CMA

## 2020-09-01 ENCOUNTER — APPOINTMENT (OUTPATIENT)
Dept: LAB | Facility: CLINIC | Age: 55
End: 2020-09-01
Payer: COMMERCIAL

## 2020-09-02 DIAGNOSIS — E13.9 POST-PANCREATECTOMY DIABETES (H): Primary | ICD-10-CM

## 2020-09-02 DIAGNOSIS — Z90.410 POST-PANCREATECTOMY DIABETES (H): Primary | ICD-10-CM

## 2020-09-02 DIAGNOSIS — Z98.890 POST-OPERATIVE STATE: Primary | ICD-10-CM

## 2020-09-02 DIAGNOSIS — E89.1 POST-PANCREATECTOMY DIABETES (H): Primary | ICD-10-CM

## 2020-09-02 RX ORDER — PROCHLORPERAZINE 25 MG/1
SUPPOSITORY RECTAL
Qty: 1 EACH | Refills: 3 | Status: SHIPPED | OUTPATIENT
Start: 2020-09-02

## 2020-09-02 RX ORDER — PROCHLORPERAZINE 25 MG/1
SUPPOSITORY RECTAL
Qty: 3 EACH | Refills: 11 | Status: SHIPPED | OUTPATIENT
Start: 2020-09-02

## 2020-09-02 RX ORDER — PROCHLORPERAZINE 25 MG/1
SUPPOSITORY RECTAL
Qty: 1 DEVICE | Refills: 0 | Status: SHIPPED | OUTPATIENT
Start: 2020-09-02

## 2020-09-03 ENCOUNTER — HOME INFUSION (PRE-WILLOW HOME INFUSION) (OUTPATIENT)
Dept: PHARMACY | Facility: CLINIC | Age: 55
End: 2020-09-03

## 2020-09-03 ENCOUNTER — OFFICE VISIT (OUTPATIENT)
Dept: TRANSPLANT | Facility: CLINIC | Age: 55
End: 2020-09-03
Attending: TRANSPLANT SURGERY
Payer: COMMERCIAL

## 2020-09-03 ENCOUNTER — TELEPHONE (OUTPATIENT)
Dept: TRANSPLANT | Facility: CLINIC | Age: 55
End: 2020-09-03

## 2020-09-03 VITALS
WEIGHT: 140.5 LBS | OXYGEN SATURATION: 99 % | BODY MASS INDEX: 23.38 KG/M2 | HEART RATE: 93 BPM | TEMPERATURE: 98 F | SYSTOLIC BLOOD PRESSURE: 145 MMHG | DIASTOLIC BLOOD PRESSURE: 84 MMHG

## 2020-09-03 DIAGNOSIS — E13.9 POST-PANCREATECTOMY DIABETES (H): ICD-10-CM

## 2020-09-03 DIAGNOSIS — R10.9 ABDOMINAL PAIN: Primary | ICD-10-CM

## 2020-09-03 DIAGNOSIS — E89.1 POST-PANCREATECTOMY DIABETES (H): ICD-10-CM

## 2020-09-03 DIAGNOSIS — K86.81 EXOCRINE PANCREATIC INSUFFICIENCY: ICD-10-CM

## 2020-09-03 DIAGNOSIS — Z90.410 ABSENCE OF PANCREAS, ACQUIRED: Primary | ICD-10-CM

## 2020-09-03 DIAGNOSIS — Z90.410 ACQUIRED TOTAL ABSENCE OF PANCREAS: ICD-10-CM

## 2020-09-03 DIAGNOSIS — Z98.890 POST-OPERATIVE STATE: ICD-10-CM

## 2020-09-03 DIAGNOSIS — Z90.410 POST-PANCREATECTOMY DIABETES (H): ICD-10-CM

## 2020-09-03 LAB
ALBUMIN SERPL-MCNC: 3.2 G/DL (ref 3.4–5)
ALP SERPL-CCNC: 149 U/L (ref 40–150)
ALT SERPL W P-5'-P-CCNC: 58 U/L (ref 0–50)
ANION GAP SERPL CALCULATED.3IONS-SCNC: 7 MMOL/L (ref 3–14)
AST SERPL W P-5'-P-CCNC: 35 U/L (ref 0–45)
BASOPHILS # BLD AUTO: 0.2 10E9/L (ref 0–0.2)
BASOPHILS NFR BLD AUTO: 2.2 %
BILIRUB SERPL-MCNC: 0.2 MG/DL (ref 0.2–1.3)
BUN SERPL-MCNC: 13 MG/DL (ref 7–30)
CALCIUM SERPL-MCNC: 8.8 MG/DL (ref 8.5–10.1)
CHLORIDE SERPL-SCNC: 106 MMOL/L (ref 94–109)
CO2 SERPL-SCNC: 27 MMOL/L (ref 20–32)
CREAT SERPL-MCNC: 0.53 MG/DL (ref 0.52–1.04)
DIFFERENTIAL METHOD BLD: ABNORMAL
EOSINOPHIL # BLD AUTO: 1.2 10E9/L (ref 0–0.7)
EOSINOPHIL NFR BLD AUTO: 16.9 %
ERYTHROCYTE [DISTWIDTH] IN BLOOD BY AUTOMATED COUNT: 15.2 % (ref 10–15)
GFR SERPL CREATININE-BSD FRML MDRD: >90 ML/MIN/{1.73_M2}
GLUCOSE SERPL-MCNC: 129 MG/DL (ref 70–99)
HCT VFR BLD AUTO: 35.2 % (ref 35–47)
HGB BLD-MCNC: 10.4 G/DL (ref 11.7–15.7)
IMM GRANULOCYTES # BLD: 0 10E9/L (ref 0–0.4)
IMM GRANULOCYTES NFR BLD: 0.1 %
LYMPHOCYTES # BLD AUTO: 2.6 10E9/L (ref 0.8–5.3)
LYMPHOCYTES NFR BLD AUTO: 35.1 %
MCH RBC QN AUTO: 26.1 PG (ref 26.5–33)
MCHC RBC AUTO-ENTMCNC: 29.5 G/DL (ref 31.5–36.5)
MCV RBC AUTO: 88 FL (ref 78–100)
MONOCYTES # BLD AUTO: 0.6 10E9/L (ref 0–1.3)
MONOCYTES NFR BLD AUTO: 8.2 %
NEUTROPHILS # BLD AUTO: 2.8 10E9/L (ref 1.6–8.3)
NEUTROPHILS NFR BLD AUTO: 37.5 %
NRBC # BLD AUTO: 0 10*3/UL
NRBC BLD AUTO-RTO: 0 /100
PLATELET # BLD AUTO: 864 10E9/L (ref 150–450)
POTASSIUM SERPL-SCNC: 3.4 MMOL/L (ref 3.4–5.3)
PROT SERPL-MCNC: 6.8 G/DL (ref 6.8–8.8)
RBC # BLD AUTO: 3.99 10E12/L (ref 3.8–5.2)
SODIUM SERPL-SCNC: 140 MMOL/L (ref 133–144)
WBC # BLD AUTO: 7.3 10E9/L (ref 4–11)

## 2020-09-03 PROCEDURE — 85025 COMPLETE CBC W/AUTO DIFF WBC: CPT | Performed by: TRANSPLANT SURGERY

## 2020-09-03 PROCEDURE — 80053 COMPREHEN METABOLIC PANEL: CPT | Performed by: TRANSPLANT SURGERY

## 2020-09-03 PROCEDURE — 36415 COLL VENOUS BLD VENIPUNCTURE: CPT | Performed by: TRANSPLANT SURGERY

## 2020-09-03 RX ORDER — LANCETS
EACH MISCELLANEOUS
Qty: 100 EACH | Refills: 11 | Status: SHIPPED | OUTPATIENT
Start: 2020-09-03

## 2020-09-03 RX ORDER — METOCLOPRAMIDE 10 MG/1
TABLET ORAL
Qty: 120 TABLET | Refills: 0 | Status: SHIPPED | OUTPATIENT
Start: 2020-09-03

## 2020-09-03 RX ORDER — GABAPENTIN 300 MG/1
300 CAPSULE ORAL 3 TIMES DAILY
Qty: 90 CAPSULE | Refills: 1 | Status: SHIPPED | OUTPATIENT
Start: 2020-09-03

## 2020-09-03 RX ORDER — HYDROMORPHONE HYDROCHLORIDE 2 MG/1
TABLET ORAL
Qty: 42 TABLET | Refills: 0 | Status: SHIPPED | OUTPATIENT
Start: 2020-09-03

## 2020-09-03 RX ORDER — PEDIATRIC MULTIVIT 61/D3/VIT K 1500-800
CAPSULE ORAL
Qty: 30 TABLET | Refills: 11 | Status: SHIPPED | OUTPATIENT
Start: 2020-09-03

## 2020-09-03 RX ORDER — HYDROMORPHONE HYDROCHLORIDE 2 MG/1
TABLET ORAL
Qty: 28 TABLET | Refills: 0 | Status: SHIPPED | OUTPATIENT
Start: 2020-09-10

## 2020-09-03 RX ORDER — HYDROMORPHONE HYDROCHLORIDE 2 MG/1
TABLET ORAL
Qty: 42 TABLET | Refills: 0 | Status: SHIPPED | OUTPATIENT
Start: 2020-09-17

## 2020-09-03 RX ORDER — PANTOPRAZOLE SODIUM 40 MG/1
TABLET, DELAYED RELEASE ORAL
Qty: 30 TABLET | Refills: 11 | Status: SHIPPED | OUTPATIENT
Start: 2020-09-03

## 2020-09-03 NOTE — PROGRESS NOTES
[facility] Progress Note    I had the pleasure of seeing Ms. Dottie Hernandez today. She is 38 days status post total pancreatectomy with islet autotransplant.    Interval events since last visit with me:   ER visits = 0, reasons: NA  Inpatient admissions = 0, reasons NA  The patient reports their current symptoms to be:   GI function:   Nausea:   [x]  none    []  rare    []  often    []  daily  Comment:   Vomiting: [x]  none    []  rare    []  often    []  daily   Comment:   Last BM: Today.  Stools are soft, frequency : 1-2.   Appetite: good  Tube feeds: NA  Oral food intake: 1-3 per day  Pancreatic exocrine insufficiency:   Takes (Creon 24), 3 with meals, 3 with snacks.  Greasy stools: [x]  none   []  rarely    []  several times/wk   []  daily      Comment:   Diabetes management:   Long acting insulin: Lantus--17 units/day  Short acting insulin: Novolog--4-6 units/day  Blood sugars typically range from 99 to 160.   Lab Results   Component Value Date    A1C 6.0 2020      Pain management:   Pain rating (0=no pain, 10=unbearable): 0  Long acting agent: NA  Short acting agent: Dilaudid 1 mg q4h and Gabapetin  mg   Daily 'Uptime': most of the day   Walking: distance 1 mile , 7 times per week  Prescription Medications as of 9/3/2020       Rx Number Disp Refills Start End Last Dispensed Date Next Fill Date Owning Pharmacy    acetaminophen *SUGAR FREE* (TYLENOL) 32 mg/mL solution  550 mL 0 2020    [facility] [place], [place] - [address]    Si.3 mLs (650 mg) by Per J Tube route every 6 hours    Class: E-Prescribe    Route: Per J Tube    Alcohol Swabs PADS  100 each 0 2020    [facility] - [place], [place] - [address]    Sig: Use to swab the area of the injection or erik as directed    Class: E-Prescribe    amylase-lipase-protease (CREON 24) 28213-62788 units CPEP per EC capsule  300 capsule 1 2020    [facility] [address]     Sig: Take 2 capsules with meals and 1 with snacks; adjust dose per physician instruction    Class: E-Prescribe    aspirin (ASA) 325 MG tablet  30 tablet 11 2020    [address]    Sig: Take 1 tablet (325 mg) by mouth daily    Class: E-Prescribe    Route: Oral    aspirin (ASA) 81 MG EC tablet  30 tablet 1 2020    [address]     Sig: Take 1 tablet daily    Class: E-Prescribe    blood glucose (NO BRAND SPECIFIED) lancets standard  100 each 3 2020    [address]    Sig: Use to test blood sugar 6-8 times daily as directed.    Class: E-Prescribe    Notes to Pharmacy: Pt has Valeriy Contour Next USB meter    blood glucose (NO BRAND SPECIFIED) test strip  200 each 4 2020    [address]     Sig: Use to test blood sugar 8-10  times daily as directed.    Class: E-Prescribe    Notes to Pharmacy: Must have contour next strips    Continuous Blood Gluc  (DEXCOM G6 ) TIERRA  1 Device 0 2020        Sig: Use to monitor blood glucose levels as directed.    Class: Local Print    Continuous Blood Gluc Sensor (DEXCOM G6 SENSOR) MISC  3 each 11 2020        Sig: Change every 10 days.    Class: Local Print    Continuous Blood Gluc Transmit (DEXCOM G6 TRANSMITTER) MISC  1 each 3 2020        Sig: Change every 3 months.    Class: Local Print    docusate (COLACE) 50 MG/5ML liquid  300 mL 1 2020    [address]    Si mLs (50 mg) by Per J Tube route 2 times daily as needed for constipation    Class: E-Prescribe    Route: Per J Tube    fluconazole (DIFLUCAN) 200 MG tablet  15 tablet 0 2020    [facility] [address]    Sig: Take 400 mg for first dose, then 20 mg daily for 2 weeks    Class: E-Prescribe    fluticasone (FLONASE) 50 MCG/ACT nasal spray    2019        Sig: Spray 1 spray into both nostrils 2 times daily     Class: Historical    Route: Both Nostrils    gabapentin (NEURONTIN) 250 MG/5ML solution  540 mL 11 2020    [address]    Si mLs (300 mg) by Oral or J tube route every 8 hours    Class: E-Prescribe    Route: Oral or J tube    gabapentin (NEURONTIN) 300 MG capsule  90 capsule 1 2020    [facility] [phone]    Sig: Take 1 capsule (300 mg) by mouth 3 times daily    Class: E-Prescribe    Route: Oral    Glucagon 3 MG/DOSE POWD  3 each 0 2020    [address]    Sig: [place] 1 each in nostril as needed (For hypoglycemia)    Class: E-Prescribe    Route: Nasal    glucose 40 % (400 mg/mL) gel  3 Tube 0 2020    [address]    Sig: 15 g every 15 minutes by mouth as needed for low blood sugar. Oral gel is preferable for conscious and able to swallow patient.    Class: E-Prescribe    HYDROmorphone, STANDARD CONC, (DILAUDID) 1 MG/ML oral solution  360 mL 0 2020    [id number] - [place], [place] - [address] [facility]    Sig: Take 4-6 mLs (4-6 mg) by mouth every 4 hours    Class: Local Print    Earliest Fill Date: 2020    Route: Oral    insulin aspart (NOVOLOG PEN) 100 UNIT/ML pen  9 mL 0 2020    [address]    Sig: Do Not give Correction Insulin if BG less than 121. -140 = 1 unit -160 = 2 units -180 = 3 units -200 = 4 units -220 = 5 units -240 = 6 units BG  241-260 = 7 units -280 = 8 units -300 = 9 units BG >300 = 10 units    Class: Local Print    insulin glargine (LANTUS PEN) 100 UNIT/ML pen  15 mL 1 8/25/2020    [address]    Sig: Inject 24 units in the morning and 10 units in the evening    Class: E-Prescribe    Notes to Pharmacy: If Lantus is not covered by insurance, may substitute Basaglar at same dose and frequency.      insulin pen needle (32G X 4 MM) 32G X 4 MM miscellaneous  100 each 3 8/6/2020    [address]    Sig: Use for insulin administration 6-8 times daily    Class: E-Prescribe    Lidocaine (LIDOCARE) 4 % Patch  5 patch 0 8/6/2020    [address]    Sig: Place 1 patch onto the skin every 24 hours To prevent lidocaine toxicity, patient should be patch free for 12 hrs daily.    Class: E-Prescribe    Route: Transdermal    LORazepam (ATIVAN) 2 MG/ML (HIGH CONC) solution  8 mL 0 8/6/2020    [address]    Sig: Take 0.25 mLs (0.5 mg) by mouth every 12 hours as needed for anxiety or nausea    Class: E-Prescribe    Route: Oral    methocarbamol (ROBAXIN) 500 MG tablet  30 tablet 0 8/27/2020    [address]    Sig: Take 1 tablet (500 mg) by mouth At Bedtime    Class: E-Prescribe    Route: Oral    metoclopramide (REGLAN) 5 MG/5ML solution  560 mL 0 8/6/2020    [address]    Sig: 10 mLs (10 mg) by Per J Tube route 4 times daily (before meals and nightly)    Class: E-Prescribe    Route: Per J Tube    multivitamins w/minerals (CERTAVITE) liquid  210 mL 0 8/6/2020    [address]    Sig: 15 mLs by Oral or Feeding Tube route  daily    Class: E-Prescribe    Route: Oral or Feeding Tube    ondansetron (ZOFRAN-ODT) 4 MG ODT tab  40 tablet 0 2020    [address]    Sig: Take 1 tablet (4 mg) by mouth every 6 hours as needed for nausea or vomiting    Class: E-Prescribe    Route: Oral    ondansetron (ZOFRAN-ODT) 4 MG ODT tab    2019        Sig: Take 4 mg by mouth every 8 hours as needed     Class: Historical    Route: Oral    pantoprazole (PROTONIX) 2 mg/mL SUSP suspension  300 mL 0 2020    [address]    Si mLs (40 mg) by Per Feeding Tube route daily    Class: E-Prescribe    Route: Per Feeding Tube    pantoprazole (PROTONIX) 40 MG EC tablet  30 tablet 1 2020    [address]    Sig: Take 1 tablet daily    Class: E-Prescribe    sennosides (SENOKOT) 8.8 MG/5ML syrup  150 mL 0 2020    [place], [place] - [address] [place] [address] St     Sig: Take 5 mLs by mouth 2 times daily    Class: E-Prescribe    Route: Oral    Sharps Container MISC  1 each 0 2020    [address]    Sig: Use as directed to dispose of needles, lancets and other sharps    Class: E-Prescribe        Physical exam:   General Appearance: in no apparent distress.   Temp:  [98  F (36.7  C)] 98  F (36.7  C)  Pulse:  [93] 93  BP: (145)/(84) 145/84  SpO2:  [99 %] 99 %   Skin: Normal, no rashes or jaundice  Heart: Regular rhythm.  Lungs: no audible wheezes or increased work of breathing.  Abdomen: The abdomen is flat, and the wound is Healing well, without hernia.  G/j Tube exit site is clean. The abdomen is non-tender, generalized.    Edema: absent.    Chronic Pancreatitis Latest Ref Rng & Units 2020 2020 2020 2020 9/3/2020   Weight - 64.048 kg 63.73 kg  62.596 kg 62.143 kg 63.73 kg   AMYLASE 30 - 110 U/L - - - - -   LIPASE 73 - 393 U/L - - - - -   HEMOGLOBIN A1C 0 - 5.6 % - - - - -   C PEPTIDE 0.9 - 6.9 ng/mL - - - - -   GLUCOSE 70 - 99 mg/dL 103(H) - - - 129(H)   HGB 11.7 - 15.7 g/dL 8.9(L) - - - 10.4(L)    - 450 10e9/L 1,902(HH) - - - 864(H)   ALBUMIN 3.4 - 5.0 g/dL 2.9(L) - - - 3.2(L)   PREALBUMIN 15 - 45 mg/dL - - - - -       Assessment and Plan: She is doing well s/p TP-IAT.  Interval progress has been good.  Postoperative gastric ileus: No. Having regular BM; transition to PO Reglan  Pancreatic exocrine insufficiency:  Continue creon 24 as directed   Malnutrition: Eating well; remove FT  Diabetes mellitus: well controlled  Abdominal pain management: switch to PO dilaudid pills and continue wean  Followup: I will see her again in clinic in 1 year   Stent: X-ray at 3 months if still there will need removal      Cherelle Loving CNP    Total time: 30 min, Counselling Time: 20 min.    Attestation:  Patient has been seen and evaluated by me.   Vital signs, labs, medications and orders were reviewed.   When obtained, diagnostic images were reviewed by me and interpreted as above.    The care plan was discussed with the multidisciplinary team and I agree with the findings and plan in this note, with any differences recorded in blue.      Steve Jarvis MD

## 2020-09-03 NOTE — LETTER
9/3/2020         RE: Dottie Hernandez  [address]  [facility] [address]        Dear Colleague,    Thank you for referring your patient, Dottie Hernandez, to the [facility]. Please see a copy of my visit note below.    Chronic Pancreatitis Group Progress Note    I had the pleasure of seeing Ms. Dottie Hernandez today. She is 38 days status post total pancreatectomy with islet autotransplant.    Interval events since last visit with me:   ER visits = 0, reasons: NA  Inpatient admissions = 0, reasons NA  The patient reports their current symptoms to be:   GI function:   Nausea:   [x]  none    []  rare    []  often    []  daily  Comment:   Vomiting: [x]  none    []  rare    []  often    []  daily   Comment:   Last BM: Today.  Stools are soft, frequency : 1-2.   Appetite: good  Tube feeds: NA  Oral food intake: 1-3 per day  Pancreatic exocrine insufficiency:   Takes (Creon 24), 3 with meals, 3 with snacks.  Greasy stools: [x]  none   []  rarely    []  several times/wk   []  daily      Comment:   Diabetes management:   Long acting insulin: Lantus--17 units/day  Short acting insulin: Novolog--4-6 units/day  Blood sugars typically range from 99 to 160.   Lab Results   Component Value Date    A1C 6.0 2020      Pain management:   Pain rating (0=no pain, 10=unbearable): 0  Long acting agent: NA  Short acting agent: Dilaudid 1 mg q4h and Gabapetin  mg   Daily 'Uptime': most of the day   Walking: distance 1 mile , 7 times per week  Prescription Medications as of 9/3/2020       Rx Number Disp Refills Start End Last Dispensed Date Next Fill Date Owning Pharmacy    acetaminophen *SUGAR FREE* (TYLENOL) 32 mg/mL solution  550 mL 0 2020    [facility] [place], [place] - [address] [facility] [address]    Si.3 mLs (650 mg) by Per J Tube route every 6 hours    Class: E-Prescribe    Route: Per J Tube    Alcohol Swabs PADS  100 each 0 2020    [facility]  [address]    Sig: Use to swab the area of the injection or erik as directed    Class: E-Prescribe    amylase-lipase-protease (CREON 24) 12067-33665 units CPEP per EC capsule  300 capsule 1 8/25/2020    [address]    Sig: Take 2 capsules with meals and 1 with snacks; adjust dose per physician instruction    Class: E-Prescribe    aspirin (ASA) 325 MG tablet  30 tablet 11 8/6/2020    [address]    Sig: Take 1 tablet (325 mg) by mouth daily    Class: E-Prescribe    Route: Oral    aspirin (ASA) 81 MG EC tablet  30 tablet 1 8/27/2020    [address]    Sig: Take 1 tablet daily    Class: E-Prescribe    blood glucose (NO BRAND SPECIFIED) lancets standard  100 each 3 8/6/2020    [address]    Sig: Use to test blood sugar 6-8 times daily as directed.    Class: E-Prescribe    Notes to Pharmacy: Pt has Valeriy Contour Next USB meter    blood glucose (NO BRAND SPECIFIED) test strip  200 each 4 8/13/2020    [address]    Sig: Use to test blood sugar 8-10  times daily as directed.    Class: E-Prescribe    Notes to Pharmacy: Must have contour next strips    Continuous Blood Gluc  (DEXCOM G6 ) TIERRA  1 Device 0 9/2/2020        Sig: Use to monitor blood glucose levels as directed.    Class: Local Print    Continuous Blood Gluc Sensor (DEXCOM G6 SENSOR) MISC  3 each 11 9/2/2020        Sig: Change every 10 days.    Class: Local Print    Continuous Blood Gluc Transmit (DEXCOM G6 TRANSMITTER) MISC  1 each 3 9/2/2020        Sig: Change every 3 months.    Class: Local Print    docusate (COLACE) 50 MG/5ML liquid  300 mL 1 8/6/2020    [facility]  [address]    Si mLs (50 mg) by Per J Tube route 2 times daily as needed for constipation    Class: E-Prescribe    Route: Per J Tube    fluconazole (DIFLUCAN) 200 MG tablet  15 tablet 0 2020    [address]    Sig: Take 400 mg for first dose, then 20 mg daily for 2 weeks    Class: E-Prescribe    fluticasone (FLONASE) 50 MCG/ACT nasal spray    2019        Sig: Spray 1 spray into both nostrils 2 times daily     Class: Historical    Route: Both Nostrils    gabapentin (NEURONTIN) 250 MG/5ML solution  540 mL 11 2020    [address]    Si mLs (300 mg) by Oral or J tube route every 8 hours    Class: E-Prescribe    Route: Oral or J tube    gabapentin (NEURONTIN) 300 MG capsule  90 capsule 1 2020    [address]    Sig: Take 1 capsule (300 mg) by mouth 3 times daily    Class: E-Prescribe    Route: Oral    Glucagon 3 MG/DOSE POWD  3 each 0 2020    [address]    Sig: [place] 1 each in nostril as needed (For hypoglycemia)    Class: E-Prescribe    Route: Nasal    glucose 40 % (400 mg/mL) gel  3 Tube 0 2020    [address]    Sig: 15 g every 15 minutes by mouth as needed for low blood sugar. Oral gel is preferable for conscious and able to swallow patient.    Class: E-Prescribe    HYDROmorphone, STANDARD CONC, (DILAUDID) 1 MG/ML oral solution  360 mL 0 2020    [facility] #[id number] - [place], [place] - [address] [facility] [address]    Sig: Take 4-6 mLs (4-6 mg) by mouth every 4 hours    Class: Local Print    Earliest Fill Date: 2020    Route: Oral    insulin aspart (NOVOLOG PEN) 100 UNIT/ML pen  9 mL 0 2020    [facility]  [address]    Sig: Do Not give Correction Insulin if BG less than 121. -140 = 1 unit -160 = 2 units -180 = 3 units -200 = 4 units -220 = 5 units -240 = 6 units -260 = 7 units -280 = 8 units -300 = 9 units BG >300 = 10 units    Class: Local Print    insulin glargine (LANTUS PEN) 100 UNIT/ML pen  15 mL 1 8/25/2020    [address]    Sig: Inject 24 units in the morning and 10 units in the evening    Class: E-Prescribe    Notes to Pharmacy: If Lantus is not covered by insurance, may substitute Basaglar at same dose and frequency.      insulin pen needle (32G X 4 MM) 32G X 4 MM miscellaneous  100 each 3 8/6/2020    [address]    Sig: Use for insulin administration 6-8 times daily    Class: E-Prescribe    Lidocaine (LIDOCARE) 4 % Patch  5 patch 0 8/6/2020    [address]    Sig: Place 1 patch onto the skin every 24 hours To prevent lidocaine toxicity, patient should be patch free for 12 hrs daily.    Class: E-Prescribe    Route: Transdermal    LORazepam (ATIVAN) 2 MG/ML (HIGH CONC) solution  8 mL 0 8/6/2020    [address]    Sig: Take 0.25 mLs (0.5 mg) by mouth every 12 hours as needed for anxiety or nausea    Class: E-Prescribe    Route: Oral    methocarbamol (ROBAXIN) 500 MG tablet  30 tablet 0 8/27/2020    [address]    Sig: Take 1 tablet (500 mg) by mouth At Bedtime    Class: E-Prescribe    Route: Oral    metoclopramide (REGLAN) 5 MG/5ML solution  560 mL 0 8/6/2020    [address]    Sig: 10 mLs (10 mg) by Per J Tube route 4 times daily (before meals and  nightly)    Class: E-Prescribe    Route: Per J Tube    multivitamins w/minerals (CERTAVITE) liquid  210 mL 0 2020    [address]    Sig: 15 mLs by Oral or Feeding Tube route daily    Class: E-Prescribe    Route: Oral or Feeding Tube    ondansetron (ZOFRAN-ODT) 4 MG ODT tab  40 tablet 0 2020    [address]    Sig: Take 1 tablet (4 mg) by mouth every 6 hours as needed for nausea or vomiting    Class: E-Prescribe    Route: Oral    ondansetron (ZOFRAN-ODT) 4 MG ODT tab    2019        Sig: Take 4 mg by mouth every 8 hours as needed     Class: Historical    Route: Oral    pantoprazole (PROTONIX) 2 mg/mL SUSP suspension  300 mL 0 2020    [address]    Si mLs (40 mg) by Per Feeding Tube route daily    Class: E-Prescribe    Route: Per Feeding Tube    pantoprazole (PROTONIX) 40 MG EC tablet  30 tablet 1 2020    [address]    Sig: Take 1 tablet daily    Class: E-Prescribe    sennosides (SENOKOT) 8.8 MG/5ML syrup  150 mL 0 2020    [address]    Sig: Take 5 mLs by mouth 2 times daily    Class: E-Prescribe    Route: Oral    Sharps Container MISC  1 each 0 2020    [address]    Sig: Use as directed to dispose of needles, lancets and other sharps    Class: E-Prescribe        Physical exam:   General Appearance: in no apparent distress.   Temp:  [98  F (36.7  C)] 98  F (36.7  C)  Pulse:  [93] 93  BP: (145)/(84) 145/84  SpO2:  [99 %] 99 %   Skin: Normal, no rashes or jaundice  Heart: Regular rhythm.  Lungs: no audible wheezes or increased work of breathing.  Abdomen: The abdomen is flat, and the wound is Healing  well, without hernia.  G/j Tube exit site is clean. The abdomen is non-tender, generalized.    Edema: absent.    Chronic Pancreatitis Latest Ref Rng & Units 8/13/2020 8/19/2020 8/20/2020 8/27/2020 9/3/2020   Weight - 64.048 kg 63.73 kg 62.596 kg 62.143 kg 63.73 kg   AMYLASE 30 - 110 U/L - - - - -   LIPASE 73 - 393 U/L - - - - -   HEMOGLOBIN A1C 0 - 5.6 % - - - - -   C PEPTIDE 0.9 - 6.9 ng/mL - - - - -   GLUCOSE 70 - 99 mg/dL 103(H) - - - 129(H)   HGB 11.7 - 15.7 g/dL 8.9(L) - - - 10.4(L)    - 450 10e9/L 1,902(HH) - - - 864(H)   ALBUMIN 3.4 - 5.0 g/dL 2.9(L) - - - 3.2(L)   PREALBUMIN 15 - 45 mg/dL - - - - -       Assessment and Plan: She is doing well s/p TP-IAT.  Interval progress has been good.  Postoperative gastric ileus: No. Having regular BM; transition to PO Reglan  Pancreatic exocrine insufficiency:  Continue creon 24 as directed   Malnutrition: Eating well; remove FT  Diabetes mellitus: well controlled  Abdominal pain management: switch to PO dilaudid pills and continue wean  Followup: I will see her again in clinic in 1 year   Stent: X-ray at 3 months if still there will need removal      Cherelle Loving CNP    Total time: 30 min, Counselling Time: 20 min.    Attestation:  Patient has been seen and evaluated by me.   Vital signs, labs, medications and orders were reviewed.   When obtained, diagnostic images were reviewed by me and interpreted as above.    The care plan was discussed with the multidisciplinary team and I agree with the findings and plan in this note, with any differences recorded in blue.      Steve Jarvis MD

## 2020-09-03 NOTE — TELEPHONE ENCOUNTER
Provider Call: Medication Clarification    Name of Medication: Insulin Aspart   New script they just received today; Question:   Pharmacy Name: [facility] [place], [place]     Callback needed? Yes    Return Call Needed  Same as documented in contacts section  When to return call?: Same day: Route High Priority

## 2020-09-03 NOTE — NURSING NOTE
Chief Complaint   Patient presents with     RECHECK     Follow up auto islet     Blood pressure (!) 145/84, pulse 93, temperature 98  F (36.7  C), temperature source Oral, weight 63.7 kg (140 lb 8 oz), SpO2 99 %, not currently breastfeeding.    Sangeeta Segovia on 9/3/2020 at 1:13 PM

## 2020-09-08 ENCOUNTER — DOCUMENTATION ONLY (OUTPATIENT)
Dept: OTHER | Facility: CLINIC | Age: 55
End: 2020-09-08

## 2020-09-09 ENCOUNTER — HOME INFUSION (PRE-WILLOW HOME INFUSION) (OUTPATIENT)
Dept: PHARMACY | Facility: CLINIC | Age: 55
End: 2020-09-09

## 2020-09-11 ENCOUNTER — DOCUMENTATION ONLY (OUTPATIENT)
Dept: TRANSPLANT | Facility: CLINIC | Age: 55
End: 2020-09-11

## 2020-09-11 NOTE — PROGRESS NOTES
This is a recent snapshot of the patient's [facility] medical record.  For current drug dose and complete information and questions, call [phone] or In Basket pool, [facility] ([id number])  CSN Number:  [id number]

## 2020-09-11 NOTE — PROGRESS NOTES
9/3/2020 Clinic Visit:     Reviewed medication list with patient, changed all medication to oral (tablet/capsule) form, and then determined medication schedule as below:     8:00 am  Lantus  Dilaudid  Gabapentin  Reglan  Protonix    Noon  Dilaudid   Reglan    4:00 pm  Dilaudid  Reglan  Gabapentin    8:00 pm  Dilaudid  Robaxin  Reglan    Bedtime  Gabapentin     Tylenol 500 mg 1-2 tablets as needed for breakthrough pain (Max daily APAP dose 4000 mg)     Patient given copy of time table and able to verbalize appropriate times.

## 2020-12-11 ENCOUNTER — TRANSFERRED RECORDS (OUTPATIENT)
Dept: HEALTH INFORMATION MANAGEMENT | Facility: CLINIC | Age: 55
End: 2020-12-11

## 2020-12-28 ENCOUNTER — TELEPHONE (OUTPATIENT)
Dept: TRANSPLANT | Facility: CLINIC | Age: 55
End: 2020-12-28

## 2020-12-29 DIAGNOSIS — E89.1 POST-PANCREATECTOMY DIABETES (H): ICD-10-CM

## 2020-12-29 DIAGNOSIS — E13.9 POST-PANCREATECTOMY DIABETES (H): ICD-10-CM

## 2020-12-29 DIAGNOSIS — Z90.410 POST-PANCREATECTOMY DIABETES (H): ICD-10-CM

## 2021-01-26 ENCOUNTER — TELEPHONE (OUTPATIENT)
Dept: ENDOCRINOLOGY | Facility: CLINIC | Age: 56
End: 2021-01-26

## 2021-01-26 NOTE — TELEPHONE ENCOUNTER
[facility] Call Center    Phone Message    May a detailed message be left on voicemail: yes     Reason for Call: Order(s): Other:   Reason for requested: [employer] is going to be faxing over paper work for Dr. Escoto to sign for billing purposes. [employer] states it is an order and she is faxing the order over today, 1/26/2021 again.     Date needed: asap    Provider name: Adarsh        Action Taken: Message routed to:  [facility] ([facility] Endo    Travel Screening: Not Applicable

## 2021-01-28 ENCOUNTER — VIRTUAL VISIT (OUTPATIENT)
Dept: TRANSPLANT | Facility: CLINIC | Age: 56
End: 2021-01-28
Attending: TRANSPLANT SURGERY
Payer: COMMERCIAL

## 2021-01-28 DIAGNOSIS — Z90.410 ABSENCE OF PANCREAS, ACQUIRED: Primary | ICD-10-CM

## 2021-01-28 PROCEDURE — 99213 OFFICE O/P EST LOW 20 MIN: CPT | Performed by: TRANSPLANT SURGERY

## 2021-01-28 NOTE — LETTER
1/28/2021         RE: Dottie Hernandez  [address]  [facility] [address]        Dear Colleague,    Thank you for referring your patient, Dottie Hernandez, to the [facility]. Please see a copy of my visit note below.    Dottie is a 55 year old who is being evaluated via a billable video visit.      How would you like to obtain your AVS? MyChart  If the video visit is dropped, the invitation should be resent by: Text to cell phone: [phone]  Will anyone else be joining your video visit? No      Video-Visit Details    Type of service:  Video Visit    Video length:30 minutes    Originating Location (pt. Location): Home    Distant Location (provider location):  [facility]     Platform used for Video Visit: Medlert  Patient provided the consent for the encounter     Chronic Pancreatitis Group Progress Note    I had the pleasure of seeing Ms. Dottie Hernandez today. She is 209 days status post total pancreatectomy with islet autotransplant.    Interval events since last visit with me:   ER visits = 0, reasons: 0  Inpatient admissions = 0, reasons 0  The patient reports their current symptoms to be:   GI function:   Nausea:   [x]  none    []  rare    []  often    []  daily  Comment:  Vomiting: [x]  none    []  rare    []  often    []  daily   Comment:   Last BM: Today.  Stools are soft  Appetite: good  Tube feeds none  Oral food intake: 4-6 per day  Pancreatic exocrine insufficiency:     Greasy stools: [x]  none   []  rarely    []  several times/wk   []  daily      Comment:   Diabetes management:     Lab Results   Component Value Date    A1C 6.0 07/23/2020      Pain management:   Off narcotics  Prescription Medications as of 2/21/2021       Rx Number Disp Refills Start End Last Dispensed Date Next Fill Date Owning Pharmacy    amylase-lipase-protease (CREON 24) 25169-03812 units CPEP per EC capsule  300 capsule 1 9/3/2020    [facility] #[id number] - [place], [place]  - [address] [facility]    Sig: Take 2 capsules with meals and 1 with snacks; adjust dose per physician instruction    Class: E-Prescribe    blood glucose (NO BRAND SPECIFIED) test strip  200 strip 11 9/3/2020    [facility] #[id number] - [place], [place] - [address] [facility]    Sig: Use to test blood sugar 6 times daily or as directed.    Class: E-Prescribe    Notes to Pharmacy: Please fill Contour Next test strip; if insurance issues please call Gena Joanna: [phone]    Continuous Blood Gluc  (DEXCOM G6 ) TIERRA  1 Device 0 9/2/2020        Sig: Use to monitor blood glucose levels as directed.    Class: Local Print    Continuous Blood Gluc Sensor (DEXCOM G6 SENSOR) MISC  3 each 11 9/2/2020        Sig: Change every 10 days.    Class: Local Print    Continuous Blood Gluc Transmit (DEXCOM G6 TRANSMITTER) MISC  1 each 3 9/2/2020        Sig: Change every 3 months.    Class: Local Print    gabapentin (NEURONTIN) 300 MG capsule  90 capsule 1 9/3/2020    [facility] [facility] #[id number] - [place], [place] - [address] [facility]    Sig: Take 1 capsule (300 mg) by mouth 3 times daily    Class: E-Prescribe    Route: Oral    Glucagon 3 MG/DOSE POWD  3 each 0 8/6/2020    [place], [place] - [address] [place] [address]    Sig: [place] 1 each in nostril as needed (For hypoglycemia)    Class: E-Prescribe    Route: Nasal    glucose 40 % (400 mg/mL) gel  3 Tube 0 8/6/2020    [address]    Sig: 15 g every 15 minutes by mouth as needed for low blood sugar. Oral gel is preferable for conscious and able to swallow patient.    Class: E-Prescribe    HYDROmorphone (DILAUDID) 2 MG tablet  42 tablet 0 9/3/2020        Sig: Take 0.5 tablets (1 mg)every 4 hours for severe abdominal pain    Class: Local Print    Earliest Fill Date: 9/3/2020     HYDROmorphone (DILAUDID) 2 MG tablet  28 tablet 0 9/10/2020        Sig: Take 1 tablet every 6 hours for severe pain    Class: Local Print    Earliest Fill Date: 9/10/2020    HYDROmorphone (DILAUDID) 2 MG tablet  42 tablet 0 9/17/2020        Sig: Take 1 tablet every 8 hours for severe abdominal pain    Class: Local Print    Earliest Fill Date: 9/17/2020    Renewals     Renewal requests to authorizing provider (Cherelle Loving NP) <b>prohibited</b>          insulin aspart (NOVOLOG PEN) 100 UNIT/ML pen  15 mL 11 9/3/2020    [facility] #[id number] - [place], [place]  [address] [address] [facility] [address] [facility]    Sig: Inject 6 times daily with meals and snacks; approximate daily maximum use 10 units    Class: E-Prescribe    insulin glargine (LANTUS PEN) 100 UNIT/ML pen  15 mL 1 12/29/2020    [id number], [place] - [address] [facility]    Sig: Inject 16 units in the morning    Class: E-Prescribe    Notes to Pharmacy: If Lantus is not covered by insurance, may substitute Basaglar at same dose and frequency.      insulin pen needle (32G X 4 MM) 32G X 4 MM miscellaneous  100 each 3 9/3/2020    [id number] - [place] - [address] [facility] AT [facility]    Sig: Use for insulin administration 6-8 times daily    Class: E-Prescribe    methocarbamol (ROBAXIN) 500 MG tablet  30 tablet 0 8/27/2020    [address]    Sig: Take 1 tablet (500 mg) by mouth At Bedtime    Class: E-Prescribe    Route: Oral    metoclopramide (REGLAN) 10 MG tablet  120 tablet 0 9/3/2020    [address]    Sig: Take 10 mg four times daily    Class: E-Prescribe    Microlet Lancets MISC  100 each 11 9/3/2020    [id number] - [place], [place] - [address]    Sig: Use to  inject insulin 4-6 times daily    Class: E-Prescribe    mvw complete formulation (CHEWABLES) tablet  30 tablet 11 9/3/2020    [facility] #[id number] - [place], [place] - [address] [facility] AT [facility]    Sig: Take 1 tablet daily    Class: Local Print    Notes to Pharmacy: NDCs:  Orange [id number]; Please fill with orange chewable tablet    ondansetron (ZOFRAN-ODT) 4 MG ODT tab    1/7/2019        Sig: Take 4 mg by mouth every 8 hours as needed     Class: Historical    Route: Oral    pantoprazole (PROTONIX) 40 MG EC tablet  30 tablet 11 9/3/2020    [facility] #[id number] - [place] - [address] [facility] AT [facility]    Sig: Take 1 tablet daily    Class: E-Prescribe        GENERAL: Healthy, alert and no distress  EYES: Eyes grossly normal to inspection.  No discharge or erythema, or obvious scleral/conjunctival abnormalities.  RESP: No audible wheeze, cough, or visible cyanosis.  No visible retractions or increased work of breathing.    SKIN: Visible skin clear. No significant rash, abnormal pigmentation or lesions.  NEURO: Cranial nerves grossly intact.  Mentation and speech appropriate for age.  PSYCH: Mentation appears normal, affect normal/bright, judgement and insight intact, normal speech and appearance well-groomed.  ABD: non distended, well healing incision      Chronic Pancreatitis Latest Ref Rng & Units 8/13/2020 8/19/2020 8/20/2020 8/27/2020 9/3/2020   Weight - 64.048 kg 63.73 kg 62.596 kg 62.143 kg 63.73 kg   AMYLASE 30 - 110 U/L - - - - -   LIPASE 73 - 393 U/L - - - - -   HEMOGLOBIN A1C 0 - 5.6 % - - - - -   C PEPTIDE 0.9 - 6.9 ng/mL - - - - -   GLUCOSE 70 - 99 mg/dL 103(H) - - - 129(H)   HGB 11.7 - 15.7 g/dL 8.9(L) - - - 10.4(L)    - 450 10e9/L 1,902(HH) - - - 864(H)   ALBUMIN 3.4 - 5.0 g/dL 2.9(L) - - - 3.2(L)   PREALBUMIN 15 - 45 mg/dL - - - - -       Assessment and Plan: She is doing well s/p TP-IAT.  Interval progress has been  good.  Postoperative gastric ileus: resolved  Pancreatic exocrine insufficiency: controlled  Malnutrition: resolved  Diabetes mellitus: controlled  Abdominal pain management: resolved      Total time: 30 min, Counselling Time: 20 min.    Steve Jarvis MD

## 2021-01-28 NOTE — PROGRESS NOTES
Dottie is a 55 year old who is being evaluated via a billable video visit.      How would you like to obtain your AVS? MyChart  If the video visit is dropped, the invitation should be resent by: Text to cell phone: [phone]  Will anyone else be joining your video visit? No      Video-Visit Details    Type of service:  Video Visit    Video length:30 minutes    Originating Location (pt. Location): Home    Distant Location (provider location):  [facility]     Platform used for Video Visit: [facility]  Patient provided the consent for the encounter     Chronic Pancreatitis Group Progress Note    I had the pleasure of seeing Ms. Dottie Hernandez today. She is 209 days status post total pancreatectomy with islet autotransplant.    Interval events since last visit with me:   ER visits = 0, reasons: 0  Inpatient admissions = 0, reasons 0  The patient reports their current symptoms to be:   GI function:   Nausea:   [x]  none    []  rare    []  often    []  daily  Comment:  Vomiting: [x]  none    []  rare    []  often    []  daily   Comment:   Last BM: Today.  Stools are soft  Appetite: good  Tube feeds none  Oral food intake: 4-6 per day  Pancreatic exocrine insufficiency:     Greasy stools: [x]  none   []  rarely    []  several times/wk   []  daily      Comment:   Diabetes management:     Lab Results   Component Value Date    A1C 6.0 07/23/2020      Pain management:   Off narcotics  Prescription Medications as of 2/21/2021       Rx Number Disp Refills Start End Last Dispensed Date Next Fill Date Owning Pharmacy    amylase-lipase-protease (CREON 24) [address]33-86[address] units CPEP per EC capsule  300 capsule 1 9/3/2020    [id number] - [place], [place] - [address]    Sig: Take 2 capsules with meals and 1 with snacks; adjust dose per physician instruction    Class: E-Prescribe    blood glucose (NO BRAND SPECIFIED) test strip  200 strip 11 9/3/2020     [facility] #[id number] - [place], [place] - [address] [facility] [address] [facility]    Sig: Use to test blood sugar 6 times daily or as directed.    Class: E-Prescribe    Notes to Pharmacy: Please fill Contour Next test strip; if insurance issues please call Gena Marshall: [phone]    Continuous Blood Gluc  (DEXCOM G6 ) TIERRA  1 Device 0 9/2/2020        Sig: Use to monitor blood glucose levels as directed.    Class: Local Print    Continuous Blood Gluc Sensor (DEXCOM G6 SENSOR) MISC  3 each 11 9/2/2020        Sig: Change every 10 days.    Class: Local Print    Continuous Blood Gluc Transmit (DEXCOM G6 TRANSMITTER) MISC  1 each 3 9/2/2020        Sig: Change every 3 months.    Class: Local Print    gabapentin (NEURONTIN) 300 MG capsule  90 capsule 1 9/3/2020    [facility][id number] - [place], [place] - [address] [facility] [address] [facility]    Sig: Take 1 capsule (300 mg) by mouth 3 times daily    Class: E-Prescribe    Route: Oral    Glucagon 3 MG/DOSE POWD  3 each 0 8/6/2020    [facility] [place], [place] - [address] [place] [address] St     Sig: [place] 1 each in nostril as needed (For hypoglycemia)    Class: E-Prescribe    Route: Nasal    glucose 40 % (400 mg/mL) gel  3 Tube 0 8/6/2020    [place], [place] - [address] [place] [address] St     Sig: 15 g every 15 minutes by mouth as needed for low blood sugar. Oral gel is preferable for conscious and able to swallow patient.    Class: E-Prescribe    HYDROmorphone (DILAUDID) 2 MG tablet  42 tablet 0 9/3/2020        Sig: Take 0.5 tablets (1 mg)every 4 hours for severe abdominal pain    Class: Local Print    Earliest Fill Date: 9/3/2020    HYDROmorphone (DILAUDID) 2 MG tablet  28 tablet 0 9/10/2020        Sig: Take 1 tablet every 6 hours for severe pain    Class: Local Print    Earliest Fill Date: 9/10/2020    HYDROmorphone (DILAUDID) 2 MG tablet  42 tablet 0 9/17/2020        Sig: Take 1 tablet  every 8 hours for severe abdominal pain    Class: Local Print    Earliest Fill Date: 9/17/2020    Renewals     Renewal requests to authorizing provider (Cherelle Loving NP) <b>prohibited</b>          insulin aspart (NOVOLOG PEN) 100 UNIT/ML pen  15 mL 11 9/3/2020    [facility] #[id number] - [place], [place] - [address] [facility]    Sig: Inject 6 times daily with meals and snacks; approximate daily maximum use 10 units    Class: E-Prescribe    insulin glargine (LANTUS PEN) 100 UNIT/ML pen  15 mL 1 12/29/2020    [facility] #[id number] [facility], [place] - [address] [facility] [address] [facility]    Sig: Inject 16 units in the morning    Class: E-Prescribe    Notes to Pharmacy: If Lantus is not covered by insurance, may substitute Basaglar at same dose and frequency.      insulin pen needle (32G X 4 MM) 32G X 4 MM miscellaneous  100 each 3 9/3/2020    [facility] #[id number] - [place] - [address] [facility]    Sig: Use for insulin administration 6-8 times daily    Class: E-Prescribe    methocarbamol (ROBAXIN) 500 MG tablet  30 tablet 0 8/27/2020    [address]    Sig: Take 1 tablet (500 mg) by mouth At Bedtime    Class: E-Prescribe    Route: Oral    metoclopramide (REGLAN) 10 MG tablet  120 tablet 0 9/3/2020    [address]    Sig: Take 10 mg four times daily    Class: E-Prescribe    Microlet Lancets MISC  100 each 11 9/3/2020    [facility] #[id number] [facility], [place] - [address] [facility] [address] [facility]    Sig: Use to inject insulin 4-6 times daily    Class: E-Prescribe    mvw complete formulation (CHEWABLES) tablet  30 tablet 11 9/3/2020    [facility] #[id number] - [place], [place] - [address] [facility] [address] [facility]    Sig: Take 1 tablet daily    Class:  Local Print    Notes to Pharmacy: NDCs:  [place] [id number]; Please fill with orange chewable tablet    ondansetron (ZOFRAN-ODT) 4 MG ODT tab    1/7/2019        Sig: Take 4 mg by mouth every 8 hours as needed     Class: Historical    Route: Oral    pantoprazole (PROTONIX) 40 MG EC tablet  30 tablet 11 9/3/2020    [facility] #[id number] - [place], [place] - [address] [facility] [address] [facility]    Sig: Take 1 tablet daily    Class: E-Prescribe        GENERAL: Healthy, alert and no distress  EYES: Eyes grossly normal to inspection.  No discharge or erythema, or obvious scleral/conjunctival abnormalities.  RESP: No audible wheeze, cough, or visible cyanosis.  No visible retractions or increased work of breathing.    SKIN: Visible skin clear. No significant rash, abnormal pigmentation or lesions.  NEURO: Cranial nerves grossly intact.  Mentation and speech appropriate for age.  PSYCH: Mentation appears normal, affect normal/bright, judgement and insight intact, normal speech and appearance well-groomed.  ABD: non distended, well healing incision      Chronic Pancreatitis Latest Ref Rng & Units 8/13/2020 8/19/2020 8/20/2020 8/27/2020 9/3/2020   Weight - 64.048 kg 63.73 kg 62.596 kg 62.143 kg 63.73 kg   AMYLASE 30 - 110 U/L - - - - -   LIPASE 73 - 393 U/L - - - - -   HEMOGLOBIN A1C 0 - 5.6 % - - - - -   C PEPTIDE 0.9 - 6.9 ng/mL - - - - -   GLUCOSE 70 - 99 mg/dL 103(H) - - - 129(H)   HGB 11.7 - 15.7 g/dL 8.9(L) - - - 10.4(L)    - 450 10e9/L 1,902(HH) - - - 864(H)   ALBUMIN 3.4 - 5.0 g/dL 2.9(L) - - - 3.2(L)   PREALBUMIN 15 - 45 mg/dL - - - - -       Assessment and Plan: She is doing well s/p TP-IAT.  Interval progress has been good.  Postoperative gastric ileus: resolved  Pancreatic exocrine insufficiency: controlled  Malnutrition: resolved  Diabetes mellitus: controlled  Abdominal pain management: resolved      Total time: 30 min, Counselling Time: 20 min.    Steve Jarvis MD

## 2021-03-04 NOTE — PROGRESS NOTES
Date:March 15, 2021      Patient was self referred, no letter generated. Do not send.        [facility] Health Information       "Pancreatitis Service - Daily Progress Note  07/30/2020    Assessment & Plan: Dottie Hernandez is a 54 year old female with a past medical history significant for chronic pancreatitis that began 20 years ago attributed to pancreatic divisum, seasonal allergies, migraines, and ANIBAL. She is now s/p TPAIT and splenectomy with stent placement with Dr. Jarvis on 7/27/20.     Cardiorespiratory:   Hypercapnic respiratory failure: Reintubated overnight, likely 2/2 meds and likely underlying sleep apnea. Planning for extubation today. Plan to utilize CPAP at night. CXR 7/29 shows \"Mild to moderate bilateral pleural effusions, left greater than right. 2. Hazy opacifications of the left lung may represent atelectasis versus infection. Recommend follow-up to clearing.\" Decreased IVF. Continue pulmonary hygiene.  Heme:  Acute blood loss anemia: EBL intra-op 500mL; received 1unit pRBC. Transfusion 7/29 overnight due to hgb of 6. Hgb 7.0 on recheck.  GI/Nutrition: NG removed. Keep G-tube to gravity, 192 out yesterday. Tube feeds per J-tube advanced to 30mL/hr. Ok to advance by 10 mL/hr every day to goal of 50 mL/hr. Continue multivitamin.  Fluid/Electrolytes: D5LR at 50mL/hr; electrolyte replacements per ICU protocol  : Armendariz to remain due to strict I&O  Post-pancreatectomy diabetes: BG 's with insulin drip at 0-7 units/hr, appreciate Endocrine consult.  Infection: Afebrile; WBC 18.9 (20.9), s/p splenectomy. Continue to monitor  Positive islet culture: growing kleb pneumo, continue zosyn, planning for 5 days.   Sepsis: Tm 102.5. Hypotensive overnight, given fluid boluses and started on empiric vanco in addition to zosyn. Lactate elevated at 2.2. Blood cultures with no growth to date.  Prophylaxis: PPI (Protonix), Anticoagulation: increase heparin drip to 400 units/hr, fungal (fluconazole)  Pain control: fair, pain team following. Current regimen:  -Precedex drip (restarted overnight)  -On-Q continuous paravertebral " "blocks  -Dilaudid PCA 0.2mg Q10min, continuous rate stopped due to oversedation/hypercapnia  -gabapentin 300mg BID  -ketamine now stopped due to sedation  -lorazepam now stopped due to sedation  -hydroxyzine 25mg Q6H PRN  Activity: as tolerated; PT/OT ordered  Anticipated LOS/Discharge: If beds available, would prefer one additional night in the ICU while on CPAP, will plan for transfer to  tomorrow    Medical Decision Making: High  Subsequent visit 55557 (high level decision making)    ANGELO/Fellow/Resident Provider: Anayeli Lyles PA-C [id number]    Faculty: Steve Jarvis MD  __________________________________________________________________  Transplant History: Admitted 7/27/2020 for TPAIT  7/27/2020 (Islet), Postoperative day: 3     Interval History: History is not obtained from the patient due to intubation  Patient re-intubated overnight. Alert at time of visit and answering questions with hand gestures    ROS:   A 10-point review of systems was negative except as noted above.    Curent Meds:    fluconazole  400 mg Intravenous Q24H     gabapentin  300 mg Oral or J tube Q8H JENNIFER     lactated ringers  250 mL Intravenous Once     multivitamins w/minerals  15 mL Oral or Feeding Tube Daily     pantoprazole  40 mg Oral or J tube Daily     piperacillin-tazobactam  3.375 g Intravenous Q6H     potassium & sodium phosphates  1 packet Per J Tube 4x Daily     disposable pump w/ anesthetic (select flow)   Irrigation Continuous Nerve Block     sodium chloride (PF)  3 mL Intracatheter Q8H     vancomycin (VANCOCIN) IV  1,250 mg Intravenous Q12H       Physical Exam:     Admit Weight: 63.1 kg (139 lb 1.8 oz)    Current Vitals:   BP 92/64   Pulse 74   Temp 98  F (36.7  C) (Axillary)   Resp 21   Ht 1.651 m (5' 5\")   Wt 72.1 kg (158 lb 15.2 oz)   SpO2 100%   BMI 26.45 kg/m      CVP (mmHg): 7 mmHg    Vital sign ranges:    Temp:  [98  F (36.7  C)-102.5  F (39.2  C)] 98  F (36.7  C)  Pulse:  [] 74  Heart Rate:  [] " 73  Resp:  [0-33] 21  BP: ()/(29-86) 92/64  FiO2 (%):  [30 %-80 %] 30 %  SpO2:  [82 %-100 %] 100 %  Patient Vitals for the past 24 hrs:   BP Temp Temp src Pulse Heart Rate Resp SpO2 Weight   07/30/20 1000 92/64 -- -- 74 73 21 -- --   07/30/20 0900 93/59 -- -- 72 73 20 -- --   07/30/20 0800 97/68 98  F (36.7  C) Axillary 70 70 20 100 % --   07/30/20 0700 99/67 -- -- 76 76 20 100 % --   07/30/20 0600 95/64 -- -- 77 77 19 91 % --   07/30/20 0500 95/61 -- -- 86 86 20 93 % --   07/30/20 0400 (!) 85/54 102.1  F (38.9  C) Axillary 87 88 20 94 % 72.1 kg (158 lb 15.2 oz)   07/30/20 0300 114/77 -- -- 109 101 17 91 % --   07/30/20 0200 121/75 -- -- 109 111 (!) 7 100 % --   07/30/20 0100 104/69 -- -- 110 112 (!) 33 100 % --   07/30/20 0032 109/72 102.5  F (39.2  C) -- 112 114 (!) 0 100 % --   07/30/20 0022 -- 101  F (38.3  C) -- -- -- -- -- --   07/30/20 0000 (!) 88/60 101  F (38.3  C) Axillary 96 97 13 98 % --   07/29/20 2300 96/57 -- -- 111 111 20 100 % --   07/29/20 2200 (!) 62/29 -- -- 79 99 25 (!) 82 % --   07/29/20 2100 126/78 -- -- 141 138 22 94 % --   07/29/20 2000 116/70 101.3  F (38.5  C) Axillary 140 138 23 95 % --   07/29/20 1900 110/80 -- -- 134 137 15 94 % --   07/29/20 1850 114/61 -- -- 133 133 18 -- --   07/29/20 1830 -- -- -- -- 131 14 98 % --   07/29/20 1800 121/75 -- -- 131 131 15 96 % --   07/29/20 1700 118/69 -- -- 129 130 15 96 % --   07/29/20 1600 125/79 99.4  F (37.4  C) Oral 121 120 11 94 % --   07/29/20 1500 126/70 -- -- 116 113 23 92 % --   07/29/20 1400 101/74 -- -- 114 114 14 98 % --   07/29/20 1300 104/63 -- -- 121 122 11 96 % --   07/29/20 1200 97/86 98.2  F (36.8  C) Oral 121 118 25 (!) 88 % --     General Appearance: intubated  Skin: normal, warm, dry, no suspicious lesions or rashes  HEENT: G tube to gravity drainage, scant output  Heart: regular rate and rhythm  Lungs: Intubated  Abdomen: The abdomen is rounded, soft and mildly tender to palpation. The wound is stapled, c/d/i. G tube to  gravity. WILMA: thin dark red output, small   : zaragoza is present- clear yellow output  Extremities: edema: present generalized.     Data:   CMP  Recent Labs   Lab 07/30/20  0456 07/29/20  2236  07/28/20  0354  07/27/20  1708 07/27/20  1503    139   < > 143   < > 141 140   POTASSIUM 3.5 3.8   < > 3.3*   < > 3.7 4.2   CHLORIDE 105 105   < > 112*   < >  --   --    CO2 28 31   < > 27   < >  --   --    * 248*   < > 107*   < > 95 134*   BUN 8 8   < > 7   < >  --   --    CR 0.62 0.66   < > 0.60   < >  --   --    GFRESTIMATED >90 >90   < > >90   < >  --   --    GFRESTBLACK >90 >90   < > >90   < >  --   --    MONICA 7.2* 7.2*   < > 7.7*   < >  --   --    ICAW  --   --   --   --   --  4.5 4.5   MAG 1.9 1.9   < > 1.7  --   --   --    PHOS 1.4* 3.0   < > 2.1*  --   --   --    AMYLASE  --   --   --  76  --   --   --    LIPASE  --   --   --  981*  --   --   --    ALBUMIN 1.9*  --   --  2.7*   < >  --   --    BILITOTAL 0.4  --   --  0.3   < >  --   --    ALKPHOS 72  --   --  39*   < >  --   --    AST 22  --   --  59*   < >  --   --    ALT 28  --   --  52*   < >  --   --     < > = values in this interval not displayed.     CBC  Recent Labs   Lab 07/30/20  0456 07/30/20  0030   HGB 7.0* 6.0*   WBC 18.9* 20.9*    227     Coags  Recent Labs   Lab 07/30/20  0340 07/29/20  0408  07/27/20  1915   INR  --   --   --  1.68*   PTT 44* 31   < >  --     < > = values in this interval not displayed.      Urinalysis  Recent Labs   Lab Test 07/29/20  0924 07/23/20  0805   COLOR Light Yellow Yellow   APPEARANCE Clear Slightly Cloudy   URINEGLC Negative Negative   URINEBILI Negative Negative   URINEKETONE Negative 5*   SG 1.018 1.020   UBLD Negative Negative   URINEPH 5.5 6.0   PROTEIN Negative Negative   NITRITE Negative Negative   LEUKEST Negative Large*   RBCU 3* 6*   WBCU 3 94*

## 2021-03-10 NOTE — TELEPHONE ENCOUNTER
[facility] Call Center    Phone Message    May a detailed message be left on voicemail: no     Reason for Call: Other: Pahda with [facility] called again to f/u on the order that they need signed by Dr. Escoto, she will be faxing it over again today     Action Taken: Message routed to:  [facility] ([facility] endo

## 2021-03-19 NOTE — TELEPHONE ENCOUNTER
will follow up with Dr. López while staying locally (and diabetes educ), then to establish with endocrinologist near her home in [place] (her GI team will refer her). will not be signing orders.

## 2021-03-25 NOTE — PROGRESS NOTES
This is a recent snapshot of the patient's [facility] medical record.  For current drug dose and complete information and questions, call [phone] or In Basket pool, [facility] home infusion ([id number])  CSN Number:  [id number]

## 2021-04-07 ENCOUNTER — TRANSFERRED RECORDS (OUTPATIENT)
Dept: HEALTH INFORMATION MANAGEMENT | Facility: CLINIC | Age: 56
End: 2021-04-07

## 2021-04-11 NOTE — TELEPHONE ENCOUNTER
6 month POST/ TPIAT follow up    she seems to really be doing well. No narcotics or oral diabetes meds. Insulin dose is approx 10 units of Humalog and 16 units of Lantus per day.  She is about to return to work, 6 months ahead of schedule.

## 2021-05-03 ENCOUNTER — TRANSFERRED RECORDS (OUTPATIENT)
Dept: HEALTH INFORMATION MANAGEMENT | Facility: CLINIC | Age: 56
End: 2021-05-03

## 2021-05-05 ENCOUNTER — TRANSFERRED RECORDS (OUTPATIENT)
Dept: HEALTH INFORMATION MANAGEMENT | Facility: CLINIC | Age: 56
End: 2021-05-05

## 2021-05-07 ENCOUNTER — TRANSFERRED RECORDS (OUTPATIENT)
Dept: HEALTH INFORMATION MANAGEMENT | Facility: CLINIC | Age: 56
End: 2021-05-07

## 2021-06-29 DIAGNOSIS — E13.9 POST-PANCREATECTOMY DIABETES (H): ICD-10-CM

## 2021-06-29 DIAGNOSIS — Z90.410 POST-PANCREATECTOMY DIABETES (H): ICD-10-CM

## 2021-06-29 DIAGNOSIS — K86.81 EXOCRINE PANCREATIC INSUFFICIENCY: Primary | ICD-10-CM

## 2021-06-29 DIAGNOSIS — E89.1 POST-PANCREATECTOMY DIABETES (H): ICD-10-CM

## 2021-09-09 ENCOUNTER — LAB (OUTPATIENT)
Dept: LAB | Facility: CLINIC | Age: 56
End: 2021-09-09
Payer: COMMERCIAL

## 2021-09-09 ENCOUNTER — ALLIED HEALTH/NURSE VISIT (OUTPATIENT)
Dept: TRANSPLANT | Facility: CLINIC | Age: 56
End: 2021-09-09
Attending: TRANSPLANT SURGERY
Payer: COMMERCIAL

## 2021-09-09 ENCOUNTER — ANCILLARY PROCEDURE (OUTPATIENT)
Dept: BONE DENSITY | Facility: CLINIC | Age: 56
End: 2021-09-09
Attending: TRANSPLANT SURGERY
Payer: COMMERCIAL

## 2021-09-09 VITALS — WEIGHT: 115.8 LBS | BODY MASS INDEX: 19.27 KG/M2

## 2021-09-09 DIAGNOSIS — N18.30 CKD (CHRONIC KIDNEY DISEASE) STAGE 3, GFR 30-59 ML/MIN (H): Primary | ICD-10-CM

## 2021-09-09 DIAGNOSIS — K31.89 GASTRIC DYSMOTILITY: ICD-10-CM

## 2021-09-09 DIAGNOSIS — K86.81 EXOCRINE PANCREATIC INSUFFICIENCY: ICD-10-CM

## 2021-09-09 DIAGNOSIS — E13.9 POST-PANCREATECTOMY DIABETES (H): ICD-10-CM

## 2021-09-09 DIAGNOSIS — E89.1 POST-PANCREATECTOMY DIABETES (H): ICD-10-CM

## 2021-09-09 DIAGNOSIS — Z90.410 POST-PANCREATECTOMY DIABETES (H): ICD-10-CM

## 2021-09-09 DIAGNOSIS — Z90.410 ABSENCE OF PANCREAS, ACQUIRED: Primary | ICD-10-CM

## 2021-09-09 DIAGNOSIS — R10.9 ABDOMINAL PAIN: Primary | ICD-10-CM

## 2021-09-09 LAB
ALBUMIN SERPL-MCNC: 3.6 G/DL (ref 3.4–5)
ALP SERPL-CCNC: 225 U/L (ref 40–150)
ALT SERPL W P-5'-P-CCNC: 79 U/L (ref 0–50)
ANION GAP SERPL CALCULATED.3IONS-SCNC: 4 MMOL/L (ref 3–14)
AST SERPL W P-5'-P-CCNC: 51 U/L (ref 0–45)
BASOPHILS # BLD MANUAL: 0.3 10E3/UL (ref 0–0.2)
BASOPHILS NFR BLD MANUAL: 3 %
BILIRUB SERPL-MCNC: 0.4 MG/DL (ref 0.2–1.3)
BUN SERPL-MCNC: 12 MG/DL (ref 7–30)
BURR CELLS BLD QL SMEAR: ABNORMAL
CALCIUM SERPL-MCNC: 9 MG/DL (ref 8.5–10.1)
CHLORIDE BLD-SCNC: 103 MMOL/L (ref 94–109)
CHOLEST SERPL-MCNC: 138 MG/DL
CO2 SERPL-SCNC: 31 MMOL/L (ref 20–32)
CREAT SERPL-MCNC: 0.62 MG/DL (ref 0.52–1.04)
EOSINOPHIL # BLD MANUAL: 0.4 10E3/UL (ref 0–0.7)
EOSINOPHIL NFR BLD MANUAL: 4 %
ERYTHROCYTE [DISTWIDTH] IN BLOOD BY AUTOMATED COUNT: 13.9 % (ref 10–15)
FERRITIN SERPL-MCNC: 65 NG/ML (ref 8–252)
GFR SERPL CREATININE-BSD FRML MDRD: >90 ML/MIN/1.73M2
GLUCOSE BLD-MCNC: 212 MG/DL (ref 70–99)
GLUCOSE BLD-MCNC: 212 MG/DL (ref 70–99)
GLUCOSE BLD-MCNC: 79 MG/DL (ref 70–99)
HBA1C MFR BLD: 8 % (ref 0–5.6)
HCT VFR BLD AUTO: 41.2 % (ref 35–47)
HDLC SERPL-MCNC: 58 MG/DL
HGB BLD-MCNC: 13.5 G/DL (ref 11.7–15.7)
IRON SATN MFR SERPL: 20 % (ref 15–46)
IRON SERPL-MCNC: 80 UG/DL (ref 35–180)
LDLC SERPL CALC-MCNC: 66 MG/DL
LYMPHOCYTES # BLD MANUAL: 4.7 10E3/UL (ref 0.8–5.3)
LYMPHOCYTES NFR BLD MANUAL: 45 %
MCH RBC QN AUTO: 28.2 PG (ref 26.5–33)
MCHC RBC AUTO-ENTMCNC: 32.8 G/DL (ref 31.5–36.5)
MCV RBC AUTO: 86 FL (ref 78–100)
MONOCYTES # BLD MANUAL: 0.7 10E3/UL (ref 0–1.3)
MONOCYTES NFR BLD MANUAL: 7 %
NEUTROPHILS # BLD MANUAL: 4.3 10E3/UL (ref 1.6–8.3)
NEUTROPHILS NFR BLD MANUAL: 41 %
NONHDLC SERPL-MCNC: 80 MG/DL
NRBC # BLD AUTO: 0.1 10E3/UL
NRBC BLD MANUAL-RTO: 1 %
PLAT MORPH BLD: ABNORMAL
PLATELET # BLD AUTO: 576 10E3/UL (ref 150–450)
POTASSIUM BLD-SCNC: 3.3 MMOL/L (ref 3.4–5.3)
PROT SERPL-MCNC: 7.1 G/DL (ref 6.8–8.8)
RBC # BLD AUTO: 4.78 10E6/UL (ref 3.8–5.2)
RBC MORPH BLD: ABNORMAL
SODIUM SERPL-SCNC: 138 MMOL/L (ref 133–144)
TIBC SERPL-MCNC: 405 UG/DL (ref 240–430)
TRIGL SERPL-MCNC: 70 MG/DL
VIT B12 SERPL-MCNC: 480 PG/ML (ref 193–986)
WBC # BLD AUTO: 10.4 10E3/UL (ref 4–11)

## 2021-09-09 PROCEDURE — 83036 HEMOGLOBIN GLYCOSYLATED A1C: CPT | Mod: 90 | Performed by: PATHOLOGY

## 2021-09-09 PROCEDURE — 84446 ASSAY OF VITAMIN E: CPT | Mod: 90 | Performed by: PATHOLOGY

## 2021-09-09 PROCEDURE — 82947 ASSAY GLUCOSE BLOOD QUANT: CPT | Mod: 90 | Performed by: PATHOLOGY

## 2021-09-09 PROCEDURE — 82306 VITAMIN D 25 HYDROXY: CPT | Mod: 90 | Performed by: PATHOLOGY

## 2021-09-09 PROCEDURE — 84590 ASSAY OF VITAMIN A: CPT | Mod: 90 | Performed by: PATHOLOGY

## 2021-09-09 PROCEDURE — 82747 ASSAY OF FOLIC ACID RBC: CPT | Mod: 90 | Performed by: PATHOLOGY

## 2021-09-09 PROCEDURE — 80061 LIPID PANEL: CPT | Mod: 90 | Performed by: PATHOLOGY

## 2021-09-09 PROCEDURE — 82542 COL CHROMOTOGRAPHY QUAL/QUAN: CPT | Mod: 90 | Performed by: PATHOLOGY

## 2021-09-09 PROCEDURE — 83550 IRON BINDING TEST: CPT | Mod: 90 | Performed by: PATHOLOGY

## 2021-09-09 PROCEDURE — 82607 VITAMIN B-12: CPT | Mod: 90 | Performed by: PATHOLOGY

## 2021-09-09 PROCEDURE — 99214 OFFICE O/P EST MOD 30 MIN: CPT | Performed by: TRANSPLANT SURGERY

## 2021-09-09 PROCEDURE — 36415 COLL VENOUS BLD VENIPUNCTURE: CPT | Performed by: PATHOLOGY

## 2021-09-09 PROCEDURE — 84630 ASSAY OF ZINC: CPT | Mod: 90 | Performed by: PATHOLOGY

## 2021-09-09 PROCEDURE — 82728 ASSAY OF FERRITIN: CPT | Mod: 90 | Performed by: PATHOLOGY

## 2021-09-09 PROCEDURE — 85027 COMPLETE CBC AUTOMATED: CPT | Performed by: PATHOLOGY

## 2021-09-09 PROCEDURE — 84134 ASSAY OF PREALBUMIN: CPT | Mod: 90 | Performed by: PATHOLOGY

## 2021-09-09 PROCEDURE — 77080 DXA BONE DENSITY AXIAL: CPT | Performed by: INTERNAL MEDICINE

## 2021-09-09 PROCEDURE — 84681 ASSAY OF C-PEPTIDE: CPT | Mod: 90 | Performed by: PATHOLOGY

## 2021-09-09 RX ORDER — METOCLOPRAMIDE 10 MG/1
10 TABLET ORAL 3 TIMES DAILY
Qty: 90 TABLET | Refills: 1 | Status: SHIPPED | OUTPATIENT
Start: 2021-09-09

## 2021-09-09 NOTE — LETTER
9/9/2021         RE: Dottie Hernandez  [address]  [facility] [address]        Dear Colleague,    Thank you for referring your patient, Dottie Hernandez, to the [facility]. Please see a copy of my visit note below.    Dottie is a 56 year old who is being evaluated via a billable video visit.      How would you like to obtain your AVS? MyChart  If the video visit is dropped, the invitation should be resent by: Text to cell phone  Will anyone else be joining your video visit? No      Video Start Time: 10:50  Video-Visit Details    Type of service:  Video Visit    Video End Time:11:10    Originating Location (pt. Location): Other lab    Distant Location (provider location):  [facility]     Platform used for Video Visit: Other: facetime due to lack of other resources    Chronic Pancreatitis Group Progress Note    I had the pleasure of seeing Ms. Dottie Hernandez today. 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    Interval events since last visit with me:      ER visits = 1, reasons: abd pain  Inpatient admissions = 1, reasons abd pain  CT showed gastroparesis, possible gastric outlet obstruction, follow-up EGD showed no obstruction, no ulcers  The patient reports their current symptoms to be:   GI function:   Nausea:   [x]  none    []  rare    []  often    []  daily  Comment:   Vomiting: [x]  none    []  rare    []  often    []  daily   Comment:   Daily BM alternating between diarrhea and constipation  Oral food intake: 1-3 per day  Pancreatic exocrine insufficiency:   Takes creon  Diabetes management:     Lab Results   Component Value Date    A1C 8.0 09/09/2021    A1C 6.0 07/23/2020      Pain management:   Dilaudid  Spasmatic abd pain that comes and goes throughout the day  Prescription Medications as of 9/9/2021       Rx Number Disp Refills Start End Last Dispensed Date Next Fill Date Owning Pharmacy     amylase-lipase-protease (CREON 24) 06599-72012 units CPEP per EC capsule  300 capsule 1 9/3/2020    [facility] #[id number] - [place], [place] - [address] [facility]    Sig: Take 2 capsules with meals and 1 with snacks; adjust dose per physician instruction    Class: E-Prescribe    blood glucose (NO BRAND SPECIFIED) test strip  200 strip 11 9/3/2020    [id number] - [facility] [address] [facility]    Sig: Use to test blood sugar 6 times daily or as directed.    Class: E-Prescribe    Notes to Pharmacy: Please fill Contour Next test strip; if insurance issues please call Gena Marshall: [phone]    Continuous Blood Gluc  (DEXCOM G6 ) TIERRA  1 Device 0 9/2/2020        Sig: Use to monitor blood glucose levels as directed.    Class: Local Print    Continuous Blood Gluc Sensor (DEXCOM G6 SENSOR) MISC  3 each 11 9/2/2020        Sig: Change every 10 days.    Class: Local Print    Continuous Blood Gluc Transmit (DEXCOM G6 TRANSMITTER) MISC  1 each 3 9/2/2020        Sig: Change every 3 months.    Class: Local Print    gabapentin (NEURONTIN) 300 MG capsule  90 capsule 1 9/3/2020    [facility] #[id number] - [facility] [address] [facility] AT [facility]    Sig: Take 1 capsule (300 mg) by mouth 3 times daily    Class: E-Prescribe    Route: Oral    Glucagon 3 MG/DOSE POWD  3 each 0 8/6/2020    [address]    Sig: [place] 1 each in nostril as needed (For hypoglycemia)    Class: E-Prescribe    Route: Nasal    glucose 40 % (400 mg/mL) gel  3 Tube 0 8/6/2020    [address]    Sig: 15 g every 15 minutes by mouth as needed for low blood sugar. Oral gel is preferable for conscious and able to swallow patient.    Class: E-Prescribe    HYDROmorphone (DILAUDID) 2 MG tablet  42 tablet 0 9/3/2020         Sig: Take 0.5 tablets (1 mg)every 4 hours for severe abdominal pain    Class: Local Print    Earliest Fill Date: 9/3/2020    HYDROmorphone (DILAUDID) 2 MG tablet  28 tablet 0 9/10/2020        Sig: Take 1 tablet every 6 hours for severe pain    Class: Local Print    Earliest Fill Date: 9/10/2020    HYDROmorphone (DILAUDID) 2 MG tablet  42 tablet 0 9/17/2020        Sig: Take 1 tablet every 8 hours for severe abdominal pain    Class: Local Print    Earliest Fill Date: 9/17/2020    Renewals     Renewal requests to authorizing provider (Cherelle Loving NP) <b>prohibited</b>          insulin aspart (NOVOLOG PEN) 100 UNIT/ML pen  15 mL 11 9/3/2020    [facility] [place] [address] [address] [facility]    Sig: Inject 6 times daily with meals and snacks; approximate daily maximum use 10 units    Class: E-Prescribe    insulin glargine (LANTUS PEN) 100 UNIT/ML pen  15 mL 1 12/29/2020    [facility] #[id number] [facility], [place] - [address] [facility]    Sig: Inject 16 units in the morning    Class: E-Prescribe    Notes to Pharmacy: If Lantus is not covered by insurance, may substitute Basaglar at same dose and frequency.      insulin pen needle (32G X 4 MM) 32G X 4 MM miscellaneous  100 each 3 9/3/2020    [facility] [facility], [place] - [address] [facility]    Sig: Use for insulin administration 6-8 times daily    Class: E-Prescribe    methocarbamol (ROBAXIN) 500 MG tablet  30 tablet 0 8/27/2020    [place], [place] - [address] [facility] [address]    Sig: Take 1 tablet (500 mg) by mouth At Bedtime    Class: E-Prescribe    Route: Oral    metoclopramide (REGLAN) 10 MG tablet  90 tablet 1 9/9/2021    [facility][id number] - [place], [place] - [address]    Sig: Take 1 tablet (10 mg) by mouth 3 times daily    Class: E-Prescribe    Route: Oral    metoclopramide (REGLAN) 10 MG  tablet  120 tablet 0 9/3/2020    [facility] [place], [place] - [address] [facility] [address]    Sig: Take 10 mg four times daily    Class: E-Prescribe    Microlet Lancets MISC  100 each 11 9/3/2020    [id number] - [place] [place] - [address] AT [facility]    Sig: Use to inject insulin 4-6 times daily    Class: E-Prescribe    mvw complete formulation (CHEWABLES) tablet  30 tablet 11 9/3/2020    [facility] #[id number] - [place], [place] - [address] [facility] AT [facility]    Sig: Take 1 tablet daily    Class: Local Print    Notes to Pharmacy: ND:  Orange [id number]; Please fill with orange chewable tablet    Nutritional Supplements (BOOST HIGH PROTEIN) LIQD    0 6/29/2021        Sig: After above baseline labs are drawn, give: 6 mL/kg to maximum of 360 mL; the beverage is to be consumed within 5 minutes.    Class: No Print Out    Notes to Pharmacy: If on pancreatic enzymes, patient may take home enzymes with Boost beverage.      Patients may take long acting insulin (Levemir and Lantus).      Patient should NOT cover Boost with Novolog, Humalog, Apidra, or regular insulin.    ondansetron (ZOFRAN-ODT) 4 MG ODT tab    1/7/2019        Sig: Take 4 mg by mouth every 8 hours as needed     Class: Historical    Route: Oral    pantoprazole (PROTONIX) 40 MG EC tablet  30 tablet 11 9/3/2020    [facility] #[id number] - [place], [place] - [address] [facility] AT [facility]    Sig: Take 1 tablet daily    Class: E-Prescribe        Physical exam:   General Appearance: in mild distress.       Skin: Normal, no rashes or jaundice  Lungs: no audible wheezes or increased work of breathing.  Edema: absent.    Chronic Pancreatitis Latest Ref Rng & Units 9/3/2020 9/9/2021 9/9/2021 9/9/2021 9/9/2021   Weight - 63.73 kg - 52.527 kg - -   AMYLASE 30 - 110 U/L - - - - -   LIPASE 73 - 393 U/L - - - - -   A1C 0.0 - 5.6 % - 8.0(H) - - -   C  PEPTIDE 0.9 - 6.9 ng/mL - - - - -   C PEPTIDE (EXTERNAL) Not established ng/mL - - - - -   GLUCOSE 70 - 99 mg/dL 129(H) 79 - 212(H) 212(H)   GLUCOSE (EXTERNAL) 65 - 139 mg/dL - - - - -   HEMOGLOBIN 11.7 - 15.7 g/dL 10.4(L) 13.5 - - -   HEMOGLOBIN (EXTERNAL) 11.1 - 15.9 g/dL - - - - -    - 450 10e3/uL 864(H) 576(H) - - -   ALBUMIN 3.4 - 5.0 g/dL 3.2(L) 3.6 - - -   ALBUMIN (EXTERNAL) 3.8 - 4.9 g/dL - - - - -   PREALBUMIN 15 - 45 mg/dL - - - - -   PREALBUMIN (EXTERNAL) 10 - 36 mg/dL - - - - -       Assessment and Plan: She is doing poorly s/p TP-IAT.  Interval progress has been poor.  Postoperative gastric ileus: likely gastroparesis based on work-up at [facility], will start reglan 10 mg tid  Will review CT imaging, if constipation will start bowel regimen  Pancreatic exocrine insufficiency: controlled  Malnutrition: ok  Diabetes mellitus: poor control   Abdominal pain management: persistent, need to manage gastroparesis   Followup: I will see her again in clinic in 1 months, video          Again, thank you for allowing me to participate in the care of your patient.        Sincerely,        Steve Jarvis MD

## 2021-09-09 NOTE — PROGRESS NOTES
Dottie is a 56 year old who is being evaluated via a billable video visit.      How would you like to obtain your AVS? MyChart  If the video visit is dropped, the invitation should be resent by: Text to cell phone  Will anyone else be joining your video visit? No      Video Start Time: 10:50  Video-Visit Details    Type of service:  Video Visit    Video End Time:11:10    Originating Location (pt. Location): Other lab    Distant Location (provider location):  [facility]     Platform used for Video Visit: Other: facetime due to lack of other resources    Chronic Pancreatitis Group Progress Note    I had the pleasure of seeing Ms. Dottie Hernandez today. 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    Interval events since last visit with me:      ER visits = 1, reasons: abd pain  Inpatient admissions = 1, reasons abd pain  CT showed gastroparesis, possible gastric outlet obstruction, follow-up EGD showed no obstruction, no ulcers  The patient reports their current symptoms to be:   GI function:   Nausea:   [x]  none    []  rare    []  often    []  daily  Comment:   Vomiting: [x]  none    []  rare    []  often    []  daily   Comment:   Daily BM alternating between diarrhea and constipation  Oral food intake: 1-3 per day  Pancreatic exocrine insufficiency:   Takes creon  Diabetes management:     Lab Results   Component Value Date    A1C 8.0 09/09/2021    A1C 6.0 07/23/2020      Pain management:   Dilaudid  Spasmatic abd pain that comes and goes throughout the day  Prescription Medications as of 9/9/2021       Rx Number Disp Refills Start End Last Dispensed Date Next Fill Date Owning Pharmacy    amylase-lipase-protease (CREON 24) 26[address]-99782 units CPEP per EC capsule  300 capsule 1 9/3/2020    [id number] - [place], [place] - [address]    Sig: Take 2 capsules with meals and 1 with snacks; adjust dose per  physician instruction    Class: E-Prescribe    blood glucose (NO BRAND SPECIFIED) test strip  200 strip 11 9/3/2020    [facility] #[id number] - [place], [place] - [address] [facility]    Sig: Use to test blood sugar 6 times daily or as directed.    Class: E-Prescribe    Notes to Pharmacy: Please fill Contour Next test strip; if insurance issues please call Gena Joanna: [phone]    Continuous Blood Gluc  (DEXCOM G6 ) TIERRA  1 Device 0 9/2/2020        Sig: Use to monitor blood glucose levels as directed.    Class: Local Print    Continuous Blood Gluc Sensor (DEXCOM G6 SENSOR) MISC  3 each 11 9/2/2020        Sig: Change every 10 days.    Class: Local Print    Continuous Blood Gluc Transmit (DEXCOM G6 TRANSMITTER) MISC  1 each 3 9/2/2020        Sig: Change every 3 months.    Class: Local Print    gabapentin (NEURONTIN) 300 MG capsule  90 capsule 1 9/3/2020    [facility] #[id number] - [place], [place] - [address] [facility]    Sig: Take 1 capsule (300 mg) by mouth 3 times daily    Class: E-Prescribe    Route: Oral    Glucagon 3 MG/DOSE POWD  3 each 0 8/6/2020    [facility] [address]    Sig: [place] 1 each in nostril as needed (For hypoglycemia)    Class: E-Prescribe    Route: Nasal    glucose 40 % (400 mg/mL) gel  3 Tube 0 8/6/2020    [address]    Sig: 15 g every 15 minutes by mouth as needed for low blood sugar. Oral gel is preferable for conscious and able to swallow patient.    Class: E-Prescribe    HYDROmorphone (DILAUDID) 2 MG tablet  42 tablet 0 9/3/2020        Sig: Take 0.5 tablets (1 mg)every 4 hours for severe abdominal pain    Class: Local Print    Earliest Fill Date: 9/3/2020    HYDROmorphone (DILAUDID) 2 MG tablet  28 tablet 0 9/10/2020        Sig: Take 1 tablet every 6 hours for severe pain    Class: Local Print     Earliest Fill Date: 9/10/2020    HYDROmorphone (DILAUDID) 2 MG tablet  42 tablet 0 9/17/2020        Sig: Take 1 tablet every 8 hours for severe abdominal pain    Class: Local Print    Earliest Fill Date: 9/17/2020    Renewals     Renewal requests to authorizing provider (Cherelle Loving, NP) <b>prohibited</b>          insulin aspart (NOVOLOG PEN) 100 UNIT/ML pen  15 mL 11 9/3/2020    [facility] #[id number] - [place], [place] - [address] AT [facility]    Sig: Inject 6 times daily with meals and snacks; approximate daily maximum use 10 units    Class: E-Prescribe    insulin glargine (LANTUS PEN) 100 UNIT/ML pen  15 mL 1 12/29/2020    [facility] #[id number] - [place], [place] - [address] [facility] AT [facility]    Sig: Inject 16 units in the morning    Class: E-Prescribe    Notes to Pharmacy: If Lantus is not covered by insurance, may substitute Basaglar at same dose and frequency.      insulin pen needle (32G X 4 MM) 32G X 4 MM miscellaneous  100 each 3 9/3/2020    [facility] #[id number] - [place], [place] - [address] [facility] [address] AT [facility]    Sig: Use for insulin administration 6-8 times daily    Class: E-Prescribe    methocarbamol (ROBAXIN) 500 MG tablet  30 tablet 0 8/27/2020    [address]    Sig: Take 1 tablet (500 mg) by mouth At Bedtime    Class: E-Prescribe    Route: Oral    metoclopramide (REGLAN) 10 MG tablet  90 tablet 1 9/9/2021    [facility] #[id number] - [place], [place] - [address]    Sig: Take 1 tablet (10 mg) by mouth 3 times daily    Class: E-Prescribe    Route: Oral    metoclopramide (REGLAN) 10 MG tablet  120 tablet 0 9/3/2020    [address]    Sig: Take 10 mg four times daily    Class: E-Prescribe    Microlet Lancets MISC  100 each 11 9/3/2020    [facility] #[id number] - [place] - [address]  [facility] AT [facility]    Sig: Use to inject insulin 4-6 times daily    Class: E-Prescribe    mvw complete formulation (CHEWABLES) tablet  30 tablet 11 9/3/2020    [facility] #[id number] - [place] - [address] [facility] [address] [facility] AT [facility]    Sig: Take 1 tablet daily    Class: Local Print    Notes to Pharmacy: NDCs:  Orange [id number]; Please fill with orange chewable tablet    Nutritional Supplements (BOOST HIGH PROTEIN) LIQD    0 6/29/2021        Sig: After above baseline labs are drawn, give: 6 mL/kg to maximum of 360 mL; the beverage is to be consumed within 5 minutes.    Class: No Print Out    Notes to Pharmacy: If on pancreatic enzymes, patient may take home enzymes with Boost beverage.      Patients may take long acting insulin (Levemir and Lantus).      Patient should NOT cover Boost with Novolog, Humalog, Apidra, or regular insulin.    ondansetron (ZOFRAN-ODT) 4 MG ODT tab    1/7/2019        Sig: Take 4 mg by mouth every 8 hours as needed     Class: Historical    Route: Oral    pantoprazole (PROTONIX) 40 MG EC tablet  30 tablet 11 9/3/2020    [facility] #[id number] - [place] - [address] [facility] [address] [facility] AT [facility]    Sig: Take 1 tablet daily    Class: E-Prescribe        Physical exam:   General Appearance: in mild distress.       Skin: Normal, no rashes or jaundice  Lungs: no audible wheezes or increased work of breathing.  Edema: absent.    Chronic Pancreatitis Latest Ref Rng & Units 9/3/2020 9/9/2021 9/9/2021 9/9/2021 9/9/2021   Weight - 63.73 kg - 52.527 kg - -   AMYLASE 30 - 110 U/L - - - - -   LIPASE 73 - 393 U/L - - - - -   A1C 0.0 - 5.6 % - 8.0(H) - - -   C PEPTIDE 0.9 - 6.9 ng/mL - - - - -   C PEPTIDE (EXTERNAL) Not established ng/mL - - - - -   GLUCOSE 70 - 99 mg/dL 129(H) 79 - 212(H) 212(H)   GLUCOSE (EXTERNAL) 65 - 139 mg/dL - - - - -   HEMOGLOBIN 11.7 - 15.7 g/dL 10.4(L) 13.5 - - -   HEMOGLOBIN (EXTERNAL) 11.1 - 15.9 g/dL -  - - - -    - 450 10e3/uL 864(H) 576(H) - - -   ALBUMIN 3.4 - 5.0 g/dL 3.2(L) 3.6 - - -   ALBUMIN (EXTERNAL) 3.8 - 4.9 g/dL - - - - -   PREALBUMIN 15 - 45 mg/dL - - - - -   PREALBUMIN (EXTERNAL) 10 - 36 mg/dL - - - - -       Assessment and Plan: She is doing poorly s/p TP-IAT.  Interval progress has been poor.  Postoperative gastric ileus: likely gastroparesis based on work-up at [facility], will start reglan 10 mg tid  Will review CT imaging, if constipation will start bowel regimen  Pancreatic exocrine insufficiency: controlled  Malnutrition: ok  Diabetes mellitus: poor control   Abdominal pain management: persistent, need to manage gastroparesis   Followup: I will see her again in clinic in 1 months, video

## 2021-09-10 LAB
C PEPTIDE SERPL-MCNC: 0.2 NG/ML (ref 0.9–6.9)
C PEPTIDE SERPL-MCNC: 0.4 NG/ML (ref 0.9–6.9)
C PEPTIDE SERPL-MCNC: <0.1 NG/ML (ref 0.9–6.9)
DEPRECATED CALCIDIOL+CALCIFEROL SERPL-MC: <30 UG/L (ref 20–75)
VITAMIN D2 SERPL-MCNC: <5 UG/L
VITAMIN D3 SERPL-MCNC: 25 UG/L

## 2021-09-11 LAB — FOLATE RBC-MCNC: 627 NG/ML

## 2021-09-12 LAB
A-TOCOPHEROL VIT E SERPL-MCNC: 6.3 MG/L
ANNOTATION COMMENT IMP: NORMAL
BETA+GAMMA TOCOPHEROL SERPL-MCNC: 0.9 MG/L
RETINYL PALMITATE SERPL-MCNC: <0.02 MG/L
VIT A SERPL-MCNC: 0.37 MG/L

## 2021-09-13 LAB — ZINC SERPL-MCNC: 78.2 UG/DL

## 2021-09-17 LAB
A-LINOLENATE SERPL-SCNC: 76 NMOL/ML (ref 50–130)
AA SERPL-SCNC: 866 NMOL/ML (ref 520–1490)
ARACHIDATE SERPL-SCNC: 31 NMOL/ML (ref 50–90)
CLINICAL BIOCHEMIST REVIEW: ABNORMAL
DHA SERPL-SCNC: 73 NMOL/ML (ref 30–250)
DOCOSAPENTAENATE W6 SERPL-SCNC: 27 NMOL/ML (ref 10–70)
DOCOSATETRAENOATE SERPL-SCNC: 29 NMOL/ML (ref 10–80)
DOCOSENOATE SERPL-SCNC: 6 NMOL/ML (ref 4–13)
DPA SERPL-SCNC: 50 NMOL/ML (ref 20–210)
EPA SERPL-SCNC: 49 NMOL/ML (ref 14–100)
FA SERPL-SCNC: 10.1 MMOL/L (ref 7.3–16.8)
G-LINOLENATE SERPL-SCNC: 41 NMOL/ML (ref 16–150)
HEXADECENOATE SERPL-SCNC: 15 NMOL/ML (ref 25–105)
HOMO-G LINOLENATE SERPL-SCNC: 214 NMOL/ML (ref 50–250)
LAURATE SERPL-SCNC: 17 NMOL/ML (ref 6–90)
LINOLEATE SERPL-SCNC: 3206 NMOL/ML (ref 2270–3850)
MEAD ACID SERPL-SCNC: 30 NMOL/ML (ref 7–30)
MONOUNSAT FA SERPL-SCNC: 2.1 MMOL/L (ref 1.3–5.8)
MYRISTATE SERPL-SCNC: 124 NMOL/ML (ref 30–450)
NERVONATE SERPL-SCNC: 103 NMOL/ML (ref 60–100)
OCTADECANOATE SERPL-SCNC: 886 NMOL/ML (ref 590–1170)
OLEATE SERPL-SCNC: 1715 NMOL/ML (ref 650–3500)
PALMITATE SERPL-SCNC: 2096 NMOL/ML (ref 1480–3730)
PALMITOLEATE SERPL-SCNC: 104 NMOL/ML (ref 110–1130)
POLYUNSAT FA SERPL-SCNC: 4.7 MMOL/L (ref 3.2–5.8)
SAT FA SERPL-SCNC: 3.3 MMOL/L (ref 2.5–5.5)
TRIENOATE/AA SERPL-SRTO: 0.04 {RATIO} (ref 0.01–0.04)
VACCENATE SERPL-SCNC: 167 NMOL/ML (ref 280–740)
W3 FA SERPL-SCNC: 0.2 MMOL/L (ref 0.2–0.5)
W6 FA SERPL-SCNC: 4.4 MMOL/L (ref 3–5.4)

## 2021-10-01 LAB — PREALB SERPL IA-MCNC: 19 MG/DL (ref 15–45)

## 2022-06-29 NOTE — PLAN OF CARE
"VS: /77 (BP Location: Left arm)   Pulse 100   Temp 99.5  F (37.5  C) (Oral)   Resp 18   Ht 1.651 m (5' 5\")   Wt 63 kg (138 lb 14.2 oz)   SpO2 98%   BMI 23.11 kg/m      Current condition: Stable on RA. Intermittent tachycardia.    Neuro: WDL  Cardio: WDL.   Respiratory: Sleep apnea otherwise WDL  GI/: Voids spontaneously, last BM 8/5   Skin: WDL  Diet:   sugar free clear liquid diet.   BG: Q4 Bg's. 109, 140, 146  LDA:  RPIV saline locked. Abd inc. Stapled MARY LOU. G tube clamped. J tube with TF running at goal rate of 50mL/hr. Relizorb due to be changed at 0815  Mobility:  up with stand by  Pain: Abdominal pain.   PRN medications: Zofran x1.   Plan of Care: Will continue to monitor and assess for changes. Prepare for possible discharge today.   " Health Maintenance Due   Topic Date Due   â¢ Medicare Advantage- Medicare Wellness Visit  Never done

## 2022-09-14 ENCOUNTER — DOCUMENTATION ONLY (OUTPATIENT)
Dept: TRANSPLANT | Facility: CLINIC | Age: 57
End: 2022-09-14

## 2022-11-11 NOTE — PROGRESS NOTES
POST follow up, 2 year    She reports taking 5mg of Percocet once daily, and has been taking 15 units of Novolog daily via insulin pump. Denies any medication other than insulin to treat diabetes. She completed her surveys and had her labs done on 08/16/2022.   
no

## 2022-12-29 ENCOUNTER — DOCUMENTATION ONLY (OUTPATIENT)
Dept: TRANSPLANT | Facility: CLINIC | Age: 57
End: 2022-12-29

## 2023-01-19 NOTE — PHARMACY-ADMISSION MEDICATION HISTORY
Admission medication history interview status for the 7/27/2020 admission is complete. See Epic admission navigator for allergy information, pharmacy, prior to admission medications and immunization status.     Medication history interview sources:  see pre-op medication history note completed by Rickey Blas on 7/23/20 - all information per this note, and last dose times updated by team pre-op      Prior to Admission medications    Medication Sig Last Dose Taking? Auth Provider   bisacodyl (DULCOLAX) 5 MG EC tablet Take 5 mg by mouth daily as needed for constipation 7/27/2020 at 0500 Yes Reported, Patient   fluticasone (FLONASE) 50 MCG/ACT nasal spray Spray 1 spray into both nostrils 2 times daily  7/27/2020 at 0500 Yes Reported, Patient   HYDROcodone-acetaminophen (NORCO) 5-325 MG tablet Take 1 tablet by mouth every 6 hours as needed for severe pain prn 7/27/2020 at 0500 Yes Reported, Patient   ondansetron (ZOFRAN-ODT) 4 MG ODT tab Take 4 mg by mouth every 8 hours as needed  7/22/2020 at Unknown time Yes Reported, Patient     Medication history completed by:     Gena Bermeo, PharmD, BCCCP         Discharge Note    Data:  Elsa Horowitz discharged to [facility] at 1055 via wheel chair.     Action:  Written discharge/follow-up instructions were sent in packet to be given to staff at [facility]. Prescriptions sent to patients preferred pharmacy. All belongings sent with patient.    Response:  Patient verbalized understanding of discharge instructions, reason for discharge, and necessary follow-up appointments.    Nurse to nurse report given to Jasmyne at [facility].  Shilpi Borges RN on 1/19/2023 at 10:55 AM

## 2023-04-03 PROBLEM — K85.90 ACUTE PANCREATITIS WITHOUT NECROSIS OR INFECTION, UNSPECIFIED: Status: ACTIVE | Noted: 2020-07-27

## 2023-08-01 ENCOUNTER — DOCUMENTATION ONLY (OUTPATIENT)
Dept: TRANSPLANT | Facility: CLINIC | Age: 58
End: 2023-08-01
Payer: COMMERCIAL

## 2023-08-02 NOTE — PROGRESS NOTES
V36 follow-up.     Reporting that she is currently taking Ketamine 80 mg six times daily, no opioids.     Denies any medication other than insulin to treat diabetes. Currently uses an insulin pump and received about 20 units of novolog daily.

## 2023-09-21 NOTE — PROGRESS NOTES
REGIONAL ANESTHESIA PAIN SERVICE CONTINUOUS NERVE INFUSION NOTE  Dottie Hernandez is a 55 year old female with history of chronic pancreatitis secondary to pancreatic divisum who is now POD #3 s/p PANCREATECTOMY, TOTAL, WITH AUTOLOGOUS PANCREATIC ISLET CELL TRANSPLANT, Splenectomy, gastrojejunostomy placement) and placement of bilateral T6-7 paravertebral (PV) catheters for postoperative pain control.    Subjective and Interval History: Intubated yesterday ~ 2200 for hypercapneic respiratory failure.  Patient seen at 0940, remains intubated, able to communicate by writing and making hand gestures.  Agrees to having a nerve block bolus prior to extubation planned for later this morning.  Tolerating pain control with nerve block continuous infusion, has been off Dilaudid IV overnight, receiving ketamine as ordered (see below).  Moving arms and legs, denies circumoral numbness, metallic taste or tinnitus.      Antithrombotic/Thrombolytic Therapy ordered:  Heparin infusion at 400 units/hr  Analgesic Medications:   Medications related to Pain Management (From now, onward)    Start     Dose/Rate Route Frequency Ordered Stop    07/30/20 1400  gabapentin (NEURONTIN) solution 300 mg      300 mg Oral or J tube EVERY 8 HOURS SCHEDULED 07/30/20 0849      07/30/20 0318  acetaminophen (TYLENOL) tablet 650 mg      650 mg Oral EVERY 4 HOURS PRN 07/30/20 0318      07/29/20 2230  dexmedetomidine (PRECEDEX) 400 mcg in 0.9% sodium chloride 100 mL      0.2-0.7 mcg/kg/hr × 63.1 kg (Dosing Weight)  3.2-11 mL/hr  Intravenous CONTINUOUS 07/29/20 2215 07/27/20 2200  lidocaine 1 % 0.1-1 mL      0.1-1 mL Other EVERY 1 HOUR PRN 07/27/20 2200 07/27/20 2200  lidocaine (LMX4) cream       Topical EVERY 1 HOUR PRN 07/27/20 2200 07/27/20 2200  hydrOXYzine (ATARAX) syrup 25 mg      25 mg Oral EVERY 6 HOURS PRN 07/27/20 2200 07/27/20 1915  [Held by provider]  HYDROmorphone (DILAUDID) PCA 0.2 mg/mL OPIOID TOLERANT     (Held by  provider since Wed 7/29/2020 at 2215 by Gena Abdullahi MD. Reason: Change in Vitals.)     Intravenous CONTINUOUS 07/27/20 1900      07/27/20 0930  ropivacaine 0.2% (NAROPIN) 750 mL in ON-Q C-Bloc select flow ([id number] holds 600-750 mL) dual cath disposable pump      14 mL/hr  Irrigation Continuous Nerve Block 07/27/20 0928             Objective:  Lab results  Recent Labs   Lab Test 07/30/20  0456   WBC 18.9*   RBC 2.45*   HGB 7.0*   HCT 21.7*   MCV 89   MCH 28.6   MCHC 32.3   RDW 13.7          Lab Results   Component Value Date    INR 1.68 07/27/2020    INR 1.06 07/23/2020       Vitals:    Temp:  [36.6  C (97.9  F)-37.3  C (99.2  F)] 37.3  C (99.2  F)  Pulse:  [93] 93  Heart Rate:  [] 100  Resp:  [10-16] 16  BP: (122-152)/() 143/88  SpO2:  [92 %-100 %] 97 %    Exam:   GEN: mild distress  NEURO/MSK: Full Strength upper and lower extremities  SKIN: bilateral paravertebral (PV) catheter sites with dressing c/d/i, no tenderness, erythema, heme, edema      Assessment and Plan:     Dottie Hernandez is a 55 year old female with history of chronic pancreatitis secondary to pancreatic divisum who is now POD #3 s/p PANCREATECTOMY, TOTAL, WITH AUTOLOGOUS PANCREATIC ISLET CELL TRANSPLANT, Splenectomy, gastrojejunostomy placement) and placement of bilateral T6-7 paravertebral (PV) catheters for postoperative pain control.    Patient intubated.  Analgesic benefit  with Q 12 hr local anesthetic bolus and nerve block infusion of ROpivacaine 0.2% at 14 mL/hr, 7 mL/hr each catheter.  Motor function intact and adequate sensory block.  No evidence of adverse side effects related to local anesthetic.         PLAN  - patient can be evaluated to receive local anesthetic bolus Q 12 hr PRN pain not controlled with continuous infusion.  Bedside RN must page [facility] to request bolus  - continue ROpivacaine 0.2% infusion rate at 14 mL/hr, 7 mL/hr each catheter  - expected change of next On-Q pump is tomorrow  -  antithrombotic/thrombolytic therapy okay to continue heparin infusion as ordered. Please contact [facility] (#[phone]) prior to any medication changes  - will continue to follow and adjust as needed        Leonel García MD  [facility]  7/30/2020 9:40 AM    [facility] Contact Info (24 hour job code pager is the last 4 digits) For in-house use only:   Job code ID: [id number]   [place] [id number]  Peds [id number]  [place] phone: dial * [phone] * [phone], enter jobcode ID, then enter call-back number.    Text: Use [email or web address] on the Intranet <Paging/Directory> tab and enter Jobcode ID.   If no call back at any time, contact the hospital  and ask for [facility] attending or backup  n   Provider Procedure Text (A): After obtaining clear surgical margins the defect was repaired by another provider.

## 2024-04-29 ENCOUNTER — TELEPHONE (OUTPATIENT)
Dept: TRANSPLANT | Facility: CLINIC | Age: 59
End: 2024-04-29
Payer: COMMERCIAL

## 2024-04-29 NOTE — TELEPHONE ENCOUNTER
Follow Up (4 years post TPIAT)    She currently takes about 11 units of Lispro daily via insulin pump. Denies medication other than insulin to treat diabetes.     She's currently taking about 2mg Buprenorphine, 4 times a day and 8mg Dilaudid, 2 times per day. She is also taking about 80mg of Ketamine 4-10 times daily.

## (undated) DEVICE — WIPES FOLEY CARE SURESTEP PROVON DFC100

## (undated) DEVICE — CONNECTOR STOPCOCK 3 WAY MALE LL HI-FLO MX9311L

## (undated) DEVICE — SU MONOCRYL 4-0 PS-2 27" UND Y426H

## (undated) DEVICE — DRAIN JACKSON PRATT RESERVOIR 100ML SU130-1305

## (undated) DEVICE — DRSG PRIMAPORE 02X3" 7133

## (undated) DEVICE — SU PDS II 4-0 SH 27" Z315H

## (undated) DEVICE — Device

## (undated) DEVICE — SU CHROMIC 4-0 SH 27" G121H

## (undated) DEVICE — PREP CHLORAPREP 26ML TINTED ORANGE  260815

## (undated) DEVICE — LINEN TOWEL PACK X6 WHITE 5487

## (undated) DEVICE — CATH TRAY FOLEY SURESTEP 16FR W/TMP PRB STLK LATEX A319416AM

## (undated) DEVICE — SOL WATER IRRIG 1000ML BOTTLE 2F7114

## (undated) DEVICE — SU PDS II 3-0 SH 27" Z316H

## (undated) DEVICE — BAG URINARY DRAIN LUBRISIL IC 4000ML LF 253509A

## (undated) DEVICE — SU SILK 3-0 TIE 12X30" A304H

## (undated) DEVICE — TUBING IV 69" STERILE 1C8160S

## (undated) DEVICE — ENDO GELPORT 100/120MM C8XX2

## (undated) DEVICE — STPL RELOAD REG TISSUE ECHELON 60 X 3.6MM BLUE GST60B

## (undated) DEVICE — SOL NACL 0.9% INJ 1000ML BAG 2B1324X

## (undated) DEVICE — SUCTION TIP YANKAUER W/O VENT K86

## (undated) DEVICE — DRSG MEDIPORE 3 1/2X13 3/4" 3573

## (undated) DEVICE — SU PROLENE 5-0 RB-1DA 36"  8556H

## (undated) DEVICE — TUBE FEEDING PURPLE POLY ENFIT 8FR 42" 461800E

## (undated) DEVICE — SU PROLENE 3-0 SHDA 36" 8522H

## (undated) DEVICE — STPL ENDO GIA 30X3.5MM MULTIFIRE BLUE 030813

## (undated) DEVICE — SPONGE LAP 18X18" X8435

## (undated) DEVICE — SU SILK 3-0 SH CR 8X18" C013D

## (undated) DEVICE — SU SILK 1 TIE 6X30" A307H

## (undated) DEVICE — SU PROLENE 4-0 SHDA 36" 8521H

## (undated) DEVICE — LINEN TOWEL PACK X30 5481

## (undated) DEVICE — SU VICRYL 3-0 SH 27" UND J416H

## (undated) DEVICE — DRAIN JACKSON PRATT CHANNEL 19FR ROUND HUBLESS SIL JP-2230

## (undated) DEVICE — DRAPE SLUSH/WARMER 66X44" ORS-320

## (undated) DEVICE — SU SILK 0 TIE 6X30" A306H

## (undated) DEVICE — TUBING INSUFFLATION W/FILTER CPC TO LUER 620-030-301

## (undated) DEVICE — SU ETHILON 3-0 PS-1 18" 1663H

## (undated) DEVICE — DRAPE ISOLATION BAG 1003

## (undated) DEVICE — DRAIN JACKSON PRATT ROUND SIL 19FR W/TROCAR LF JP-2232

## (undated) DEVICE — SU SILK 2-0 SH CR 5X18" C0125

## (undated) DEVICE — CLIP APPLIER ENDO ROTATING 10MM MED/LG ER320

## (undated) DEVICE — STPL ENDO ARTICULATING 45MM EC45A

## (undated) DEVICE — SYR 10ML LL W/O NDL 302995

## (undated) DEVICE — TUBING PRESSURE MONITOR M/F CONNECTOR 48" 50P148

## (undated) DEVICE — ENDO TROCAR FIRST ENTRY KII FIOS Z-THRD 05X100MM CTF03

## (undated) DEVICE — MANOMETER VENOUS PRESSURE W/4-WAY STOPCOCK 35ML MX441

## (undated) DEVICE — TUBING PRESSURE W/MALE TO FEMALE CONNECTOR 24" 50P124

## (undated) DEVICE — SU PROLENE 4-0 RB-1DA 36" 8557H

## (undated) DEVICE — SYR 01ML 27GA 0.5" NDL TBC 309623

## (undated) DEVICE — ESU HARMONIC LAPAROSCOPIC SHEARS HD 1000I 36CM HARHD36

## (undated) DEVICE — SU PROLENE 6-0 RB-2DA 30" 8711H

## (undated) DEVICE — GLOVE PROTEXIS W/NEU-THERA 6.5  2D73TE65

## (undated) DEVICE — CONNECTOR FUNNEL TRANSITION ENFIT F00106

## (undated) DEVICE — SU SILK 4-0 TIE 12X30" A303H

## (undated) DEVICE — SUCTION TIP YANKAUER STR K87

## (undated) DEVICE — ENDO TROCAR SLEEVE KII Z-THREADED 05X100MM CTS02

## (undated) DEVICE — SU SILK 2-0 SH 30" K833H

## (undated) DEVICE — ENDO TROCAR FIRST ENTRY KII FIOS Z-THRD 12X100MM CTF73

## (undated) DEVICE — SU PDS II 1 TP-1 48" Z880G

## (undated) DEVICE — ANTIFOG SOLUTION W/FOAM PAD 31142527

## (undated) DEVICE — PAD CHUX UNDERPAD 23X24" 7136

## (undated) DEVICE — SYR 50ML LL W/O NDL 309653

## (undated) DEVICE — STPL POWERED ECHELON 60MM PSEE60A

## (undated) DEVICE — NDL BLUNT 15GA 1.5"

## (undated) DEVICE — SUCTION IRR STRYKERFLOW II W/TIP 250-070-520

## (undated) DEVICE — SOL NACL 0.9% IRRIG 1000ML BOTTLE 2F7124

## (undated) DEVICE — SU SILK 2-0 TIE 12X30" A305H

## (undated) DEVICE — SU PDS II 6-0 RB-2DA 30" Z149H

## (undated) DEVICE — ESU GROUND PAD ADULT W/CORD E7507

## (undated) DEVICE — SURGICEL ABSORBABLE HEMOSTAT SNOW 4"X4" 2083

## (undated) RX ORDER — LIDOCAINE HYDROCHLORIDE 20 MG/ML
INJECTION, SOLUTION EPIDURAL; INFILTRATION; INTRACAUDAL; PERINEURAL
Status: DISPENSED
Start: 2020-07-27

## (undated) RX ORDER — FENTANYL CITRATE-0.9 % NACL/PF 10 MCG/ML
PLASTIC BAG, INJECTION (ML) INTRAVENOUS
Status: DISPENSED
Start: 2020-07-27

## (undated) RX ORDER — ALBUMIN, HUMAN INJ 5% 5 %
SOLUTION INTRAVENOUS
Status: DISPENSED
Start: 2020-07-27

## (undated) RX ORDER — FENTANYL CITRATE 50 UG/ML
INJECTION, SOLUTION INTRAMUSCULAR; INTRAVENOUS
Status: DISPENSED
Start: 2020-07-27

## (undated) RX ORDER — HEPARIN SODIUM 1000 [USP'U]/ML
INJECTION, SOLUTION INTRAVENOUS; SUBCUTANEOUS
Status: DISPENSED
Start: 2020-07-27

## (undated) RX ORDER — PROPOFOL 10 MG/ML
INJECTION, EMULSION INTRAVENOUS
Status: DISPENSED
Start: 2020-07-27

## (undated) RX ORDER — GLYCOPYRROLATE 0.2 MG/ML
INJECTION, SOLUTION INTRAMUSCULAR; INTRAVENOUS
Status: DISPENSED
Start: 2020-07-27

## (undated) RX ORDER — ERTAPENEM 1 G/1
INJECTION, POWDER, LYOPHILIZED, FOR SOLUTION INTRAMUSCULAR; INTRAVENOUS
Status: DISPENSED
Start: 2020-07-27

## (undated) RX ORDER — HYDROMORPHONE HYDROCHLORIDE 1 MG/ML
INJECTION, SOLUTION INTRAMUSCULAR; INTRAVENOUS; SUBCUTANEOUS
Status: DISPENSED
Start: 2020-07-27

## (undated) RX ORDER — ONDANSETRON 2 MG/ML
INJECTION INTRAMUSCULAR; INTRAVENOUS
Status: DISPENSED
Start: 2020-07-27

## (undated) RX ORDER — DEXTROSE, SODIUM CHLORIDE, SODIUM LACTATE, POTASSIUM CHLORIDE, AND CALCIUM CHLORIDE 5; .6; .31; .03; .02 G/100ML; G/100ML; G/100ML; G/100ML; G/100ML
INJECTION, SOLUTION INTRAVENOUS
Status: DISPENSED
Start: 2020-07-27